# Patient Record
Sex: MALE | Race: WHITE | NOT HISPANIC OR LATINO | Employment: FULL TIME | URBAN - NONMETROPOLITAN AREA
[De-identification: names, ages, dates, MRNs, and addresses within clinical notes are randomized per-mention and may not be internally consistent; named-entity substitution may affect disease eponyms.]

---

## 2017-11-07 ENCOUNTER — TRANSCRIBE ORDERS (OUTPATIENT)
Dept: ADMINISTRATIVE | Facility: HOSPITAL | Age: 27
End: 2017-11-07

## 2017-11-07 DIAGNOSIS — N50.819 TESTICULAR PAIN: Primary | ICD-10-CM

## 2017-11-10 ENCOUNTER — APPOINTMENT (OUTPATIENT)
Dept: ULTRASOUND IMAGING | Facility: HOSPITAL | Age: 27
End: 2017-11-10

## 2018-06-20 ENCOUNTER — APPOINTMENT (OUTPATIENT)
Dept: CT IMAGING | Age: 28
End: 2018-06-20
Payer: COMMERCIAL

## 2018-06-20 ENCOUNTER — HOSPITAL ENCOUNTER (EMERGENCY)
Age: 28
Discharge: HOME | End: 2018-06-20
Attending: EMERGENCY MEDICINE
Payer: COMMERCIAL

## 2018-06-20 VITALS
WEIGHT: 260 LBS | DIASTOLIC BLOOD PRESSURE: 82 MMHG | HEART RATE: 93 BPM | SYSTOLIC BLOOD PRESSURE: 149 MMHG | BODY MASS INDEX: 36.4 KG/M2 | TEMPERATURE: 97.8 F | HEIGHT: 71 IN | OXYGEN SATURATION: 95 % | RESPIRATION RATE: 19 BRPM

## 2018-06-20 DIAGNOSIS — N20.1 URETEROLITHIASIS: Primary | ICD-10-CM

## 2018-06-20 LAB
ALBUMIN SERPL-MCNC: 4.8 G/DL (ref 3.5–5.2)
ALP BLD-CCNC: 98 U/L (ref 40–130)
ALT SERPL-CCNC: 43 U/L (ref 5–41)
ANION GAP SERPL CALCULATED.3IONS-SCNC: 12 MMOL/L (ref 7–19)
AST SERPL-CCNC: 29 U/L (ref 5–40)
BASOPHILS ABSOLUTE: 0.2 K/UL (ref 0–0.2)
BASOPHILS RELATIVE PERCENT: 1.5 % (ref 0–1)
BILIRUB SERPL-MCNC: 0.8 MG/DL (ref 0.2–1.2)
BILIRUBIN URINE: NEGATIVE
BLOOD, URINE: ABNORMAL
BUN BLDV-MCNC: 9 MG/DL (ref 6–20)
CALCIUM SERPL-MCNC: 9.8 MG/DL (ref 8.6–10)
CHLORIDE BLD-SCNC: 104 MMOL/L (ref 98–111)
CLARITY: ABNORMAL
CO2: 28 MMOL/L (ref 22–29)
COLOR: ABNORMAL
CREAT SERPL-MCNC: 0.9 MG/DL (ref 0.5–1.2)
CRYSTALS, UA: ABNORMAL /HPF
EOSINOPHILS ABSOLUTE: 0.5 K/UL (ref 0–0.6)
EOSINOPHILS RELATIVE PERCENT: 4.5 % (ref 0–5)
GFR NON-AFRICAN AMERICAN: >60
GLUCOSE BLD-MCNC: 125 MG/DL (ref 74–109)
GLUCOSE URINE: NEGATIVE MG/DL
HCT VFR BLD CALC: 47 % (ref 42–52)
HEMOGLOBIN: 16.1 G/DL (ref 14–18)
KETONES, URINE: NEGATIVE MG/DL
LEUKOCYTE ESTERASE, URINE: NEGATIVE
LYMPHOCYTES ABSOLUTE: 2.1 K/UL (ref 1.1–4.5)
LYMPHOCYTES RELATIVE PERCENT: 18.2 % (ref 20–40)
MCH RBC QN AUTO: 30.8 PG (ref 27–31)
MCHC RBC AUTO-ENTMCNC: 34.3 G/DL (ref 33–37)
MCV RBC AUTO: 90 FL (ref 80–94)
MONOCYTES ABSOLUTE: 0.9 K/UL (ref 0–0.9)
MONOCYTES RELATIVE PERCENT: 7.6 % (ref 0–10)
MUCUS: ABNORMAL /LPF
NEUTROPHILS ABSOLUTE: 7.9 K/UL (ref 1.5–7.5)
NEUTROPHILS RELATIVE PERCENT: 67.9 % (ref 50–65)
NITRITE, URINE: NEGATIVE
PDW BLD-RTO: 11.5 % (ref 11.5–14.5)
PH UA: 5.5
PLATELET # BLD: 285 K/UL (ref 130–400)
PMV BLD AUTO: 10.1 FL (ref 9.4–12.4)
POTASSIUM SERPL-SCNC: 3.5 MMOL/L (ref 3.5–5)
PROTEIN UA: 30 MG/DL
RBC # BLD: 5.22 M/UL (ref 4.7–6.1)
RBC UA: ABNORMAL /HPF (ref 0–2)
SODIUM BLD-SCNC: 144 MMOL/L (ref 136–145)
SPECIFIC GRAVITY UA: 1.03
TOTAL PROTEIN: 8 G/DL (ref 6.6–8.7)
URINE REFLEX TO CULTURE: ABNORMAL
UROBILINOGEN, URINE: 0.2 E.U./DL
WBC # BLD: 11.6 K/UL (ref 4.8–10.8)
WBC UA: ABNORMAL /HPF (ref 0–5)

## 2018-06-20 PROCEDURE — 36415 COLL VENOUS BLD VENIPUNCTURE: CPT

## 2018-06-20 PROCEDURE — 81001 URINALYSIS AUTO W/SCOPE: CPT

## 2018-06-20 PROCEDURE — 96374 THER/PROPH/DIAG INJ IV PUSH: CPT

## 2018-06-20 PROCEDURE — 85025 COMPLETE CBC W/AUTO DIFF WBC: CPT

## 2018-06-20 PROCEDURE — 74150 CT ABDOMEN W/O CONTRAST: CPT

## 2018-06-20 PROCEDURE — 6360000002 HC RX W HCPCS: Performed by: EMERGENCY MEDICINE

## 2018-06-20 PROCEDURE — 96376 TX/PRO/DX INJ SAME DRUG ADON: CPT

## 2018-06-20 PROCEDURE — 99284 EMERGENCY DEPT VISIT MOD MDM: CPT | Performed by: EMERGENCY MEDICINE

## 2018-06-20 PROCEDURE — 99284 EMERGENCY DEPT VISIT MOD MDM: CPT

## 2018-06-20 PROCEDURE — 2580000003 HC RX 258: Performed by: EMERGENCY MEDICINE

## 2018-06-20 PROCEDURE — 80053 COMPREHEN METABOLIC PANEL: CPT

## 2018-06-20 PROCEDURE — 96375 TX/PRO/DX INJ NEW DRUG ADDON: CPT

## 2018-06-20 RX ORDER — HYDROCODONE BITARTRATE AND ACETAMINOPHEN 5; 325 MG/1; MG/1
1 TABLET ORAL EVERY 6 HOURS PRN
Qty: 16 TABLET | Refills: 0 | Status: SHIPPED | OUTPATIENT
Start: 2018-06-20 | End: 2018-06-27

## 2018-06-20 RX ORDER — MONTELUKAST SODIUM 10 MG/1
10 TABLET ORAL NIGHTLY
COMMUNITY

## 2018-06-20 RX ORDER — TAMSULOSIN HYDROCHLORIDE 0.4 MG/1
0.4 CAPSULE ORAL DAILY
Qty: 10 CAPSULE | Refills: 0 | Status: SHIPPED | OUTPATIENT
Start: 2018-06-20 | End: 2020-02-01

## 2018-06-20 RX ORDER — MORPHINE SULFATE 1 MG/ML
4 INJECTION, SOLUTION EPIDURAL; INTRATHECAL; INTRAVENOUS ONCE
Status: COMPLETED | OUTPATIENT
Start: 2018-06-20 | End: 2018-06-20

## 2018-06-20 RX ORDER — ONDANSETRON 4 MG/1
4 TABLET, ORALLY DISINTEGRATING ORAL EVERY 8 HOURS PRN
Qty: 15 TABLET | Refills: 0 | Status: SHIPPED | OUTPATIENT
Start: 2018-06-20 | End: 2020-02-01

## 2018-06-20 RX ORDER — HYDROCODONE BITARTRATE AND ACETAMINOPHEN 10; 325 MG/1; MG/1
1 TABLET ORAL EVERY 6 HOURS PRN
COMMUNITY
End: 2018-06-20 | Stop reason: ALTCHOICE

## 2018-06-20 RX ORDER — ONDANSETRON 2 MG/ML
4 INJECTION INTRAMUSCULAR; INTRAVENOUS ONCE
Status: COMPLETED | OUTPATIENT
Start: 2018-06-20 | End: 2018-06-20

## 2018-06-20 RX ORDER — SODIUM CHLORIDE 9 MG/ML
1000 INJECTION, SOLUTION INTRAVENOUS ONCE
Status: COMPLETED | OUTPATIENT
Start: 2018-06-20 | End: 2018-06-20

## 2018-06-20 RX ADMIN — Medication 4 MG: at 21:02

## 2018-06-20 RX ADMIN — ONDANSETRON HYDROCHLORIDE 4 MG: 2 INJECTION, SOLUTION INTRAMUSCULAR; INTRAVENOUS at 21:02

## 2018-06-20 RX ADMIN — SODIUM CHLORIDE 1000 ML: 9 INJECTION, SOLUTION INTRAVENOUS at 21:07

## 2018-06-20 RX ADMIN — Medication 4 MG: at 22:44

## 2018-06-20 ASSESSMENT — PAIN DESCRIPTION - ORIENTATION: ORIENTATION: RIGHT

## 2018-06-20 ASSESSMENT — ENCOUNTER SYMPTOMS
VOMITING: 0
SHORTNESS OF BREATH: 0
NAUSEA: 1
ABDOMINAL PAIN: 0

## 2018-06-20 ASSESSMENT — PAIN SCALES - GENERAL
PAINLEVEL_OUTOF10: 7
PAINLEVEL_OUTOF10: 9
PAINLEVEL_OUTOF10: 10
PAINLEVEL_OUTOF10: 7

## 2018-06-20 ASSESSMENT — PAIN DESCRIPTION - LOCATION: LOCATION: FLANK

## 2018-06-20 ASSESSMENT — PAIN DESCRIPTION - PAIN TYPE
TYPE: ACUTE PAIN
TYPE: ACUTE PAIN

## 2020-02-01 ENCOUNTER — HOSPITAL ENCOUNTER (EMERGENCY)
Age: 30
Discharge: HOME | End: 2020-02-02
Attending: EMERGENCY MEDICINE
Payer: COMMERCIAL

## 2020-02-01 LAB
BILIRUBIN URINE: NEGATIVE
BLOOD, URINE: NEGATIVE
CLARITY: ABNORMAL
COLOR: YELLOW
GLUCOSE URINE: NEGATIVE MG/DL
KETONES, URINE: NEGATIVE MG/DL
LEUKOCYTE ESTERASE, URINE: NEGATIVE
NITRITE, URINE: NEGATIVE
PH UA: 7 (ref 5–8)
PROTEIN UA: NEGATIVE MG/DL
SPECIFIC GRAVITY UA: 1.02 (ref 1–1.03)
UROBILINOGEN, URINE: 0.2 E.U./DL

## 2020-02-01 PROCEDURE — 96375 TX/PRO/DX INJ NEW DRUG ADDON: CPT

## 2020-02-01 PROCEDURE — 96374 THER/PROPH/DIAG INJ IV PUSH: CPT

## 2020-02-01 PROCEDURE — 99283 EMERGENCY DEPT VISIT LOW MDM: CPT

## 2020-02-01 PROCEDURE — 2580000003 HC RX 258: Performed by: EMERGENCY MEDICINE

## 2020-02-01 PROCEDURE — 6360000002 HC RX W HCPCS: Performed by: EMERGENCY MEDICINE

## 2020-02-01 RX ORDER — ONDANSETRON 2 MG/ML
4 INJECTION INTRAMUSCULAR; INTRAVENOUS ONCE
Status: COMPLETED | OUTPATIENT
Start: 2020-02-01 | End: 2020-02-01

## 2020-02-01 RX ORDER — HYDROCODONE BITARTRATE AND ACETAMINOPHEN 10; 325 MG/1; MG/1
1 TABLET ORAL EVERY 6 HOURS PRN
COMMUNITY
End: 2021-12-29

## 2020-02-01 RX ORDER — SODIUM CHLORIDE, SODIUM LACTATE, POTASSIUM CHLORIDE, CALCIUM CHLORIDE 600; 310; 30; 20 MG/100ML; MG/100ML; MG/100ML; MG/100ML
1000 INJECTION, SOLUTION INTRAVENOUS ONCE
Status: COMPLETED | OUTPATIENT
Start: 2020-02-01 | End: 2020-02-02

## 2020-02-01 RX ORDER — ONDANSETRON 2 MG/ML
INJECTION INTRAMUSCULAR; INTRAVENOUS
Status: DISCONTINUED
Start: 2020-02-01 | End: 2020-02-02 | Stop reason: HOSPADM

## 2020-02-01 RX ORDER — KETOROLAC TROMETHAMINE 30 MG/ML
INJECTION, SOLUTION INTRAMUSCULAR; INTRAVENOUS
Status: DISCONTINUED
Start: 2020-02-01 | End: 2020-02-02 | Stop reason: HOSPADM

## 2020-02-01 RX ORDER — KETOROLAC TROMETHAMINE 30 MG/ML
30 INJECTION, SOLUTION INTRAMUSCULAR; INTRAVENOUS ONCE
Status: COMPLETED | OUTPATIENT
Start: 2020-02-01 | End: 2020-02-01

## 2020-02-01 RX ORDER — LISINOPRIL 10 MG/1
10 TABLET ORAL DAILY
COMMUNITY

## 2020-02-01 RX ADMIN — ONDANSETRON 4 MG: 2 INJECTION INTRAMUSCULAR; INTRAVENOUS at 23:26

## 2020-02-01 RX ADMIN — KETOROLAC TROMETHAMINE 30 MG: 30 INJECTION, SOLUTION INTRAMUSCULAR at 23:26

## 2020-02-01 RX ADMIN — SODIUM CHLORIDE, POTASSIUM CHLORIDE, SODIUM LACTATE AND CALCIUM CHLORIDE 1000 ML: 600; 310; 30; 20 INJECTION, SOLUTION INTRAVENOUS at 23:26

## 2020-02-01 ASSESSMENT — ENCOUNTER SYMPTOMS
EYE PAIN: 0
EYE REDNESS: 0
ABDOMINAL PAIN: 0
SHORTNESS OF BREATH: 0
COUGH: 0
VOMITING: 0
VOICE CHANGE: 0
DIARRHEA: 0
RHINORRHEA: 0

## 2020-02-01 ASSESSMENT — PAIN DESCRIPTION - DESCRIPTORS: DESCRIPTORS: SHARP

## 2020-02-01 ASSESSMENT — PAIN SCALES - GENERAL
PAINLEVEL_OUTOF10: 10
PAINLEVEL_OUTOF10: 10

## 2020-02-01 ASSESSMENT — PAIN DESCRIPTION - LOCATION: LOCATION: FLANK

## 2020-02-01 ASSESSMENT — PAIN DESCRIPTION - ORIENTATION: ORIENTATION: RIGHT

## 2020-02-02 VITALS
RESPIRATION RATE: 18 BRPM | HEART RATE: 82 BPM | TEMPERATURE: 97.8 F | OXYGEN SATURATION: 94 % | SYSTOLIC BLOOD PRESSURE: 109 MMHG | WEIGHT: 270 LBS | BODY MASS INDEX: 38.65 KG/M2 | HEIGHT: 70 IN | DIASTOLIC BLOOD PRESSURE: 74 MMHG

## 2020-02-02 PROCEDURE — 6360000002 HC RX W HCPCS: Performed by: EMERGENCY MEDICINE

## 2020-02-02 PROCEDURE — 99999 PR OFFICE/OUTPT VISIT,PROCEDURE ONLY: CPT | Performed by: EMERGENCY MEDICINE

## 2020-02-02 PROCEDURE — 96375 TX/PRO/DX INJ NEW DRUG ADDON: CPT

## 2020-02-02 PROCEDURE — 81003 URINALYSIS AUTO W/O SCOPE: CPT

## 2020-02-02 RX ORDER — ONDANSETRON 4 MG/1
4 TABLET, ORALLY DISINTEGRATING ORAL EVERY 8 HOURS PRN
Qty: 20 TABLET | Refills: 0 | Status: SHIPPED | OUTPATIENT
Start: 2020-02-02 | End: 2020-02-17 | Stop reason: SDUPTHER

## 2020-02-02 RX ORDER — NAPROXEN 500 MG/1
500 TABLET ORAL 2 TIMES DAILY WITH MEALS
Qty: 20 TABLET | Refills: 0 | Status: SHIPPED | OUTPATIENT
Start: 2020-02-02 | End: 2020-02-10

## 2020-02-02 RX ORDER — HYOSCYAMINE SULFATE 0.12 MG/1
0.12 TABLET SUBLINGUAL
Qty: 15 EACH | Refills: 0 | Status: SHIPPED | OUTPATIENT
Start: 2020-02-02 | End: 2020-02-27 | Stop reason: ALTCHOICE

## 2020-02-02 RX ORDER — MORPHINE SULFATE 4 MG/ML
2 INJECTION, SOLUTION INTRAMUSCULAR; INTRAVENOUS ONCE
Status: COMPLETED | OUTPATIENT
Start: 2020-02-02 | End: 2020-02-02

## 2020-02-02 RX ADMIN — MORPHINE SULFATE 2 MG: 4 INJECTION, SOLUTION INTRAMUSCULAR; INTRAVENOUS at 00:20

## 2020-02-02 ASSESSMENT — PAIN SCALES - GENERAL: PAINLEVEL_OUTOF10: 8

## 2020-02-02 NOTE — ED PROVIDER NOTES
[facility] EMERGENCY DEPT  EMERGENCY DEPARTMENT ENCOUNTER      Pt Name: Kasey Neal  MRN: [id number]  [place] 1990  Date of evaluation: 2/1/2020  Provider: Yvrose So MD    [address] [place] [address]       Chief Complaint   Patient presents with    Flank Pain     Right side;  Pain began about 1700. Hx of kidney stones           HISTORY OF PRESENT ILLNESS   (Location/Symptom, Timing/Onset,Context/Setting, Quality, Duration, Modifying Factors, Severity)  Note limiting factors. Kasey Neal is a 34 y.o. male who presents to the emergency department with complaint of right low back pain that started approximately 530 this evening. Pain is sharp and does not radiate anywhere else. Has associated nausea but no vomiting. Has not noticed any change in urine appearance or character recently. Has a history of kidney stones in the past and states this feels similar to what he experienced with kidney stone previously. Has never had any large kidney stones that did not pass on their own without difficulty. HPI    NursingNotes were reviewed. REVIEW OF SYSTEMS    (2-9 systems for level 4, 10 or more for level 5)     Review of Systems   Constitutional: Negative for fatigue and fever. HENT: Negative for congestion, rhinorrhea and voice change. Eyes: Negative for pain and redness. Respiratory: Negative for cough and shortness of breath. Cardiovascular: Negative for chest pain. Gastrointestinal: Negative for abdominal pain, diarrhea and vomiting. Endocrine: Negative. Genitourinary: Positive for flank pain. Musculoskeletal: Negative for arthralgias and gait problem. Skin: Negative for rash and wound. Neurological: Negative for weakness and headaches. Hematological: Negative. Psychiatric/Behavioral: Negative. All other systems reviewed and are negative. A complete review of systems was performed and is negative except as noted above in the HPI.        PAST MEDICAL HISTORY     Past Medical Resp SpO2 Height Weight   (!) 143/100 97.8 °F (36.6 °C) Oral 82 18 93 % 5' 10\" (1.778 m) 270 lb (122.5 kg)       Physical Exam  Vitals signs and nursing note reviewed. Constitutional:       General: He is not in acute distress. Appearance: Normal appearance. He is well-developed. He is not diaphoretic. HENT:      Head: Normocephalic and atraumatic. Mouth/Throat:      Pharynx: No oropharyngeal exudate. Eyes:      General: No scleral icterus. Pupils: Pupils are equal, round, and reactive to light. Neck:      Musculoskeletal: Normal range of motion. Trachea: No tracheal deviation. Cardiovascular:      Rate and Rhythm: Normal rate. Pulses: Normal pulses. Heart sounds: Normal heart sounds. Pulmonary:      Effort: Pulmonary effort is normal.      Breath sounds: Normal breath sounds. No stridor. No wheezing or rhonchi. Abdominal:      General: There is no distension. Palpations: Abdomen is soft. Abdomen is not rigid. Tenderness: There is no abdominal tenderness. There is no right CVA tenderness, left CVA tenderness or guarding. Hernia: No hernia is present. Musculoskeletal:         General: No deformity. Skin:     General: Skin is warm and dry. Findings: No rash. Neurological:      Mental Status: He is alert and oriented to person, place, and time. Cranial Nerves: No cranial nerve deficit.       Coordination: Coordination normal.   Psychiatric:         Behavior: Behavior normal.         DIAGNOSTIC RESULTS     EKG: All EKG's are interpreted by the Emergency Department Physician who either signs or Co-signs this chart in the absence of a cardiologist.        RADIOLOGY:   Non-plain film images such as CT, Ultrasound and MRI are read by the radiologist. Plainradiographic images are visualized and preliminarily interpreted by the emergency physician with the below findings:        Interpretation per the Radiologist below, if available at the time of this note:    No orders to display         ED BEDSIDE ULTRASOUND:   Performed by ED Physician - none    LABS:  Labs Reviewed   URINALYSIS - Abnormal; Notable for the following components:       Result Value    Clarity, UA CLOUDY (*)     All other components within normal limits       All other labs were within normal range or not returned as of this dictation. Medications   lactated ringers infusion 1,000 mL (1,000 mLs Intravenous New Bag 2/1/20 2326)   ondansetron (ZOFRAN) 4 MG/2ML injection (has no administration in time range)   ketorolac (TORADOL) 30 MG/ML injection (has no administration in time range)   morphine injection 2 mg (has no administration in time range)   ketorolac (TORADOL) injection 30 mg (30 mg Intravenous Given 2/1/20 2326)   ondansetron (ZOFRAN) injection 4 mg (4 mg Intravenous Given 2/1/20 2326)       EMERGENCY DEPARTMENT COURSE and DIFFERENTIALDIAGNOSIS/MDM:   Vitals:    Vitals:    02/01/20 2302 02/01/20 2303 02/01/20 2333 02/02/20 0002   BP: (!) 133/98  (!) 126/92 112/77   Pulse:       Resp:       Temp:       TempSrc:       SpO2:  96% 94%    Weight:       Height:           MDM     Description of pain and presentation are consistent with renal stone. On previous work-ups, patient has never had a stone larger than 2 mm, and has no exam findings concerning for hydronephrosis. Urinalysis shows no significant hematuria and no signs of infection. Pain is improved after treatment here in the emergency department I feel patient is stable for discharge with symptomatic treatment of presumed nonobstructive kidney stone. Discussed Pacific return precautions with patient and he is agreeable. Evaluation and work-up here revealed no signs of emergent or life-threatening pathology that would necessitate admission for further work-up or management at this time. It is felt to be stable for discharge home with return precautions for worsening of the condition or development of new concerning symptoms. Patient was encouraged to follow-up with their primary care doctor in the appropriate timeframe. Necessary prescriptions and information have been provided for treatment at home. Patient voices understanding and agreement with the plan. CONSULTS:  None    PROCEDURES:  Unless otherwise notedbelow, none     Procedures      FINAL IMPRESSION     1. Acute right flank pain    2. History of kidney stones    3.  Nausea          DISPOSITION/PLAN   DISPOSITION        PATIENT REFERRED TO:  [facility] EMERGENCY DEPT  [place] Teresitadevin  [phone]    If symptoms worsen    Shannen Carreno, DO  [address]  [place] [id number] [phone]      As needed      DISCHARGE MEDICATIONS:  New Prescriptions    HYOSCYAMINE SULFATE SL (LEVSIN/SL) 0.125 MG SUBL    Place 0.125 mg under the tongue every 4-6 hours as needed (abdominal cramping/pain)    NAPROXEN (NAPROSYN) 500 MG TABLET    Take 1 tablet by mouth 2 times daily (with meals) for 10 days    ONDANSETRON (ZOFRAN ODT) 4 MG DISINTEGRATING TABLET    Take 1 tablet by mouth every 8 hours as needed for Nausea or Vomiting          (Please note that portions of this note were completed with a voice recognition program.  Efforts were made to edit the dictations butoccasionally words are mis-transcribed.)    Yvrose So MD (electronically signed)  AttendingEmergency Physician          Yvrose Garcia MD  02/02/20 1972

## 2020-02-03 ENCOUNTER — HOSPITAL ENCOUNTER (EMERGENCY)
Age: 30
Discharge: HOME | End: 2020-02-03
Attending: EMERGENCY MEDICINE
Payer: COMMERCIAL

## 2020-02-03 ENCOUNTER — APPOINTMENT (OUTPATIENT)
Dept: ULTRASOUND IMAGING | Age: 30
End: 2020-02-03
Payer: COMMERCIAL

## 2020-02-03 ENCOUNTER — APPOINTMENT (OUTPATIENT)
Dept: CT IMAGING | Age: 30
End: 2020-02-03
Payer: COMMERCIAL

## 2020-02-03 ENCOUNTER — OFFICE VISIT (OUTPATIENT)
Dept: UROLOGY | Age: 30
End: 2020-02-03
Payer: COMMERCIAL

## 2020-02-03 VITALS
SYSTOLIC BLOOD PRESSURE: 137 MMHG | RESPIRATION RATE: 18 BRPM | OXYGEN SATURATION: 91 % | DIASTOLIC BLOOD PRESSURE: 89 MMHG | HEART RATE: 98 BPM | TEMPERATURE: 98.3 F | BODY MASS INDEX: 38.65 KG/M2 | HEIGHT: 70 IN | WEIGHT: 270 LBS

## 2020-02-03 VITALS
HEART RATE: 108 BPM | BODY MASS INDEX: 38.8 KG/M2 | HEIGHT: 70 IN | SYSTOLIC BLOOD PRESSURE: 158 MMHG | TEMPERATURE: 96.5 F | DIASTOLIC BLOOD PRESSURE: 94 MMHG | WEIGHT: 271 LBS

## 2020-02-03 LAB
ALBUMIN SERPL-MCNC: 4.5 G/DL (ref 3.5–5.2)
ALP BLD-CCNC: 86 U/L (ref 40–130)
ALPHA FETOPROTEIN: 2.7 NG/ML (ref 0–8.3)
ALT SERPL-CCNC: 19 U/L (ref 5–41)
AMPHETAMINE SCREEN, URINE: NEGATIVE
ANION GAP SERPL CALCULATED.3IONS-SCNC: 12 MMOL/L (ref 7–19)
AST SERPL-CCNC: 20 U/L (ref 5–40)
BARBITURATE SCREEN URINE: NEGATIVE
BASOPHILS ABSOLUTE: 0.1 K/UL (ref 0–0.2)
BASOPHILS RELATIVE PERCENT: 0.4 % (ref 0–1)
BENZODIAZEPINE SCREEN, URINE: NEGATIVE
BILIRUB SERPL-MCNC: 2 MG/DL (ref 0.2–1.2)
BILIRUBIN URINE: NEGATIVE
BILIRUBIN, POC: 0
BLOOD URINE, POC: NORMAL
BLOOD, URINE: NEGATIVE
BUN BLDV-MCNC: 8 MG/DL (ref 6–20)
CALCIUM SERPL-MCNC: 9.3 MG/DL (ref 8.6–10)
CANNABINOID SCREEN URINE: NEGATIVE
CHLORIDE BLD-SCNC: 98 MMOL/L (ref 98–111)
CLARITY, POC: CLEAR
CLARITY: CLEAR
CO2: 24 MMOL/L (ref 22–29)
COCAINE METABOLITE SCREEN URINE: NEGATIVE
COLOR, POC: YELLOW
COLOR: YELLOW
CREAT SERPL-MCNC: 0.5 MG/DL (ref 0.5–1.2)
EOSINOPHILS ABSOLUTE: 0 K/UL (ref 0–0.6)
EOSINOPHILS RELATIVE PERCENT: 0.2 % (ref 0–5)
GFR NON-AFRICAN AMERICAN: >60
GLUCOSE BLD-MCNC: 124 MG/DL (ref 74–109)
GLUCOSE URINE, POC: NORMAL
GLUCOSE URINE: NEGATIVE MG/DL
HCT VFR BLD CALC: 48.6 % (ref 42–52)
HEMOGLOBIN: 16.2 G/DL (ref 14–18)
IMMATURE GRANULOCYTES #: 0.1 K/UL
INR BLD: 1.13 (ref 0.88–1.18)
KETONES, POC: NORMAL
KETONES, URINE: NEGATIVE MG/DL
LACTATE DEHYDROGENASE: 294 U/L (ref 91–215)
LEUKOCYTE EST, POC: NORMAL
LEUKOCYTE ESTERASE, URINE: NEGATIVE
LIPASE: 21 U/L (ref 13–60)
LYMPHOCYTES ABSOLUTE: 0.9 K/UL (ref 1.1–4.5)
LYMPHOCYTES RELATIVE PERCENT: 5.5 % (ref 20–40)
Lab: ABNORMAL
MCH RBC QN AUTO: 30.8 PG (ref 27–31)
MCHC RBC AUTO-ENTMCNC: 33.3 G/DL (ref 33–37)
MCV RBC AUTO: 92.4 FL (ref 80–94)
MONOCYTES ABSOLUTE: 1.9 K/UL (ref 0–0.9)
MONOCYTES RELATIVE PERCENT: 11.5 % (ref 0–10)
NEUTROPHILS ABSOLUTE: 13.4 K/UL (ref 1.5–7.5)
NEUTROPHILS RELATIVE PERCENT: 82 % (ref 50–65)
NITRITE, POC: NORMAL
NITRITE, URINE: NEGATIVE
OPIATE SCREEN URINE: POSITIVE
PDW BLD-RTO: 11.4 % (ref 11.5–14.5)
PH UA: 7.5 (ref 5–8)
PH, POC: 7
PLATELET # BLD: 274 K/UL (ref 130–400)
PMV BLD AUTO: 10.6 FL (ref 9.4–12.4)
POTASSIUM SERPL-SCNC: 4.6 MMOL/L (ref 3.5–5)
PROTEIN UA: NEGATIVE MG/DL
PROTEIN, POC: NORMAL
PROTHROMBIN TIME: 13.9 SEC (ref 12–14.6)
RBC # BLD: 5.26 M/UL (ref 4.7–6.1)
SODIUM BLD-SCNC: 134 MMOL/L (ref 136–145)
SPECIFIC GRAVITY UA: 1.02 (ref 1–1.03)
SPECIFIC GRAVITY, POC: 1.01
TOTAL PROTEIN: 7 G/DL (ref 6.6–8.7)
URINE REFLEX TO CULTURE: NORMAL
UROBILINOGEN, POC: 0.2
UROBILINOGEN, URINE: 0.2 E.U./DL
WBC # BLD: 16.4 K/UL (ref 4.8–10.8)

## 2020-02-03 PROCEDURE — 81003 URINALYSIS AUTO W/O SCOPE: CPT

## 2020-02-03 PROCEDURE — 96376 TX/PRO/DX INJ SAME DRUG ADON: CPT

## 2020-02-03 PROCEDURE — 36415 COLL VENOUS BLD VENIPUNCTURE: CPT

## 2020-02-03 PROCEDURE — 80307 DRUG TEST PRSMV CHEM ANLYZR: CPT

## 2020-02-03 PROCEDURE — 96375 TX/PRO/DX INJ NEW DRUG ADDON: CPT

## 2020-02-03 PROCEDURE — 71260 CT THORAX DX C+: CPT

## 2020-02-03 PROCEDURE — 83615 LACTATE (LD) (LDH) ENZYME: CPT

## 2020-02-03 PROCEDURE — 6360000004 HC RX CONTRAST MEDICATION: Performed by: EMERGENCY MEDICINE

## 2020-02-03 PROCEDURE — 81003 URINALYSIS AUTO W/O SCOPE: CPT | Performed by: UROLOGY

## 2020-02-03 PROCEDURE — 82105 ALPHA-FETOPROTEIN SERUM: CPT

## 2020-02-03 PROCEDURE — 96374 THER/PROPH/DIAG INJ IV PUSH: CPT

## 2020-02-03 PROCEDURE — 74177 CT ABD & PELVIS W/CONTRAST: CPT

## 2020-02-03 PROCEDURE — 99284 EMERGENCY DEPT VISIT MOD MDM: CPT

## 2020-02-03 PROCEDURE — 76870 US EXAM SCROTUM: CPT

## 2020-02-03 PROCEDURE — 6360000002 HC RX W HCPCS: Performed by: EMERGENCY MEDICINE

## 2020-02-03 PROCEDURE — 99204 OFFICE O/P NEW MOD 45 MIN: CPT | Performed by: UROLOGY

## 2020-02-03 PROCEDURE — 6370000000 HC RX 637 (ALT 250 FOR IP): Performed by: EMERGENCY MEDICINE

## 2020-02-03 PROCEDURE — 83690 ASSAY OF LIPASE: CPT

## 2020-02-03 PROCEDURE — 85025 COMPLETE CBC W/AUTO DIFF WBC: CPT

## 2020-02-03 PROCEDURE — 85610 PROTHROMBIN TIME: CPT

## 2020-02-03 PROCEDURE — 2580000003 HC RX 258: Performed by: EMERGENCY MEDICINE

## 2020-02-03 PROCEDURE — 84704 HCG FREE BETACHAIN TEST: CPT

## 2020-02-03 PROCEDURE — 80053 COMPREHEN METABOLIC PANEL: CPT

## 2020-02-03 RX ORDER — SODIUM CHLORIDE, SODIUM LACTATE, POTASSIUM CHLORIDE, CALCIUM CHLORIDE 600; 310; 30; 20 MG/100ML; MG/100ML; MG/100ML; MG/100ML
1000 INJECTION, SOLUTION INTRAVENOUS ONCE
Status: COMPLETED | OUTPATIENT
Start: 2020-02-03 | End: 2020-02-03

## 2020-02-03 RX ORDER — OXYCODONE HYDROCHLORIDE AND ACETAMINOPHEN 5; 325 MG/1; MG/1
1 TABLET ORAL EVERY 6 HOURS PRN
Qty: 12 TABLET | Refills: 0 | Status: ON HOLD | OUTPATIENT
Start: 2020-02-03 | End: 2020-02-04 | Stop reason: SDUPTHER

## 2020-02-03 RX ORDER — MORPHINE SULFATE 4 MG/ML
4 INJECTION, SOLUTION INTRAMUSCULAR; INTRAVENOUS ONCE
Status: COMPLETED | OUTPATIENT
Start: 2020-02-03 | End: 2020-02-03

## 2020-02-03 RX ORDER — MORPHINE SULFATE 4 MG/ML
4 INJECTION, SOLUTION INTRAMUSCULAR; INTRAVENOUS ONCE
Status: DISCONTINUED | OUTPATIENT
Start: 2020-02-03 | End: 2020-02-03

## 2020-02-03 RX ORDER — MORPHINE SULFATE 4 MG/ML
4 INJECTION, SOLUTION INTRAMUSCULAR; INTRAVENOUS ONCE
Status: DISCONTINUED | OUTPATIENT
Start: 2020-02-03 | End: 2020-02-03 | Stop reason: HOSPADM

## 2020-02-03 RX ORDER — ONDANSETRON 2 MG/ML
4 INJECTION INTRAMUSCULAR; INTRAVENOUS ONCE
Status: COMPLETED | OUTPATIENT
Start: 2020-02-03 | End: 2020-02-03

## 2020-02-03 RX ORDER — OXYCODONE HYDROCHLORIDE AND ACETAMINOPHEN 5; 325 MG/1; MG/1
1 TABLET ORAL ONCE
Status: COMPLETED | OUTPATIENT
Start: 2020-02-03 | End: 2020-02-03

## 2020-02-03 RX ORDER — PROMETHAZINE HYDROCHLORIDE 25 MG/1
25 TABLET ORAL EVERY 6 HOURS PRN
Qty: 20 TABLET | Refills: 0 | Status: SHIPPED | OUTPATIENT
Start: 2020-02-03 | End: 2020-04-21 | Stop reason: SDUPTHER

## 2020-02-03 RX ORDER — ALBUTEROL SULFATE 90 UG/1
AEROSOL, METERED RESPIRATORY (INHALATION)
COMMUNITY
Start: 2020-01-21

## 2020-02-03 RX ADMIN — OXYCODONE HYDROCHLORIDE AND ACETAMINOPHEN 1 TABLET: 5; 325 TABLET ORAL at 11:29

## 2020-02-03 RX ADMIN — MORPHINE SULFATE 4 MG: 4 INJECTION, SOLUTION INTRAMUSCULAR; INTRAVENOUS at 07:14

## 2020-02-03 RX ADMIN — MORPHINE SULFATE 4 MG: 4 INJECTION, SOLUTION INTRAMUSCULAR; INTRAVENOUS at 09:31

## 2020-02-03 RX ADMIN — SODIUM CHLORIDE, POTASSIUM CHLORIDE, SODIUM LACTATE AND CALCIUM CHLORIDE 1000 ML: 600; 310; 30; 20 INJECTION, SOLUTION INTRAVENOUS at 09:30

## 2020-02-03 RX ADMIN — ONDANSETRON 4 MG: 2 INJECTION INTRAMUSCULAR; INTRAVENOUS at 07:14

## 2020-02-03 RX ADMIN — IOPAMIDOL 90 ML: 755 INJECTION, SOLUTION INTRAVENOUS at 07:56

## 2020-02-03 RX ADMIN — MORPHINE SULFATE 4 MG: 4 INJECTION, SOLUTION INTRAMUSCULAR; INTRAVENOUS at 11:11

## 2020-02-03 RX ADMIN — SODIUM CHLORIDE, POTASSIUM CHLORIDE, SODIUM LACTATE AND CALCIUM CHLORIDE 1000 ML: 600; 310; 30; 20 INJECTION, SOLUTION INTRAVENOUS at 07:14

## 2020-02-03 ASSESSMENT — ENCOUNTER SYMPTOMS
EYE REDNESS: 0
NAUSEA: 1
BACK PAIN: 1
DIARRHEA: 0
SHORTNESS OF BREATH: 0
VOMITING: 1
ABDOMINAL PAIN: 0
CONSTIPATION: 0
BACK PAIN: 0
SHORTNESS OF BREATH: 0
WHEEZING: 0
COUGH: 0
EYE DISCHARGE: 0

## 2020-02-03 ASSESSMENT — PAIN SCALES - GENERAL
PAINLEVEL_OUTOF10: 9
PAINLEVEL_OUTOF10: 9
PAINLEVEL_OUTOF10: 8
PAINLEVEL_OUTOF10: 7
PAINLEVEL_OUTOF10: 8
PAINLEVEL_OUTOF10: 9

## 2020-02-03 ASSESSMENT — PAIN DESCRIPTION - ORIENTATION: ORIENTATION: RIGHT;LEFT

## 2020-02-03 ASSESSMENT — PAIN DESCRIPTION - LOCATION: LOCATION: BACK

## 2020-02-03 NOTE — ED PROVIDER NOTES
partner: None     Emotionally abused: None     Physically abused: None     Forced sexual activity: None   Other Topics Concern    None   Social History Narrative    None       SCREENINGS             PHYSICAL EXAM    (up to 7 for level 4, 8 or more for level 5)     ED Triage Vitals [02/03/20 0636]   BP Temp Temp Source Pulse Resp SpO2 Height Weight   (!) 158/98 98.3 °F (36.8 °C) Oral 100 16 95 % 5' 10\" (1.778 m) 270 lb (122.5 kg)       Physical Exam  Vitals signs and nursing note reviewed. Constitutional:       General: He is not in acute distress. Appearance: Normal appearance. He is obese. He is not ill-appearing, toxic-appearing or diaphoretic. HENT:      Head: Normocephalic and atraumatic. Nose: Nose normal.      Mouth/Throat:      Mouth: Mucous membranes are moist.   Eyes:      Extraocular Movements: Extraocular movements intact. Pupils: Pupils are equal, round, and reactive to light. Comments: Mildly enlarged bilat pupils    Neck:      Musculoskeletal: Normal range of motion and neck supple. Cardiovascular:      Rate and Rhythm: Normal rate and regular rhythm. Pulses: Normal pulses. Pulmonary:      Effort: Pulmonary effort is normal.      Breath sounds: Normal breath sounds. Abdominal:      General: Abdomen is flat. Tenderness: There is no abdominal tenderness. Genitourinary:     Comments: On my exam the patient has really no tenderness of his testicles however the left one appears normal the right 1 feels hard and lumpy to me it does not feel normal consistency compared to the left. The patient has a normal penis. Musculoskeletal: Normal range of motion. Comments: R lower back and flank with pain not midline    Skin:     General: Skin is warm and dry. Capillary Refill: Capillary refill takes less than 2 seconds. Findings: Rash present.       Comments: Ant lower abd wall and arms with scaling and erythema chronic    Neurological:      General: No focal - Abnormal; Notable for the following components:    WBC 16.4 (*)     RDW 11.4 (*)     Neutrophils % 82.0 (*)     Lymphocytes % 5.5 (*)     Monocytes % 11.5 (*)     Neutrophils Absolute 13.4 (*)     Lymphocytes Absolute 0.9 (*)     Monocytes Absolute 1.90 (*)     All other components within normal limits   URINE DRUG SCREEN - Abnormal; Notable for the following components:    Opiate Scrn, Ur Positive (*)     All other components within normal limits   LACTATE DEHYDROGENASE - Abnormal; Notable for the following components:     (*)     All other components within normal limits   LIPASE   PROTIME-INR   URINE RT REFLEX TO CULTURE   AFP TUMOR MARKER   HCG, TUMOR MARKER       All other labs were within normal range or not returned as of this dictation. EMERGENCY DEPARTMENT COURSE and DIFFERENTIALDIAGNOSIS/MDM:   Vitals:    Vitals:    02/03/20 0636 02/03/20 0702 02/03/20 0732   BP: (!) 158/98 (!) 159/98 137/89   Pulse: 100  98   Resp: 16  18   Temp: 98.3 °F (36.8 °C)     TempSrc: Oral     SpO2: 95% 92% 91%   Weight: 270 lb (122.5 kg)     Height: 5' 10\" (1.778 m)         MDM  Number of Diagnoses or Management Options  Mass of right testicle: new and requires workup  Retroperitoneal lymphadenopathy: new and requires workup  Diagnosis management comments: Patient with back pain. Chronic pain medication. He was seen in the ER the other day his urine was negative. He felt like this may be a kidney stone. But it is very low down on the right. Really seems more musculoskeletal clinically his urine is negative. Given his leukocytosis I ordered a CT scan with IV contrast of his abdomen pelvis. On my review there is clearly some mass. Radiology read this as retroperitoneal lymphadenopathy. The patient does not have any history of cancer or lymphoma. His white blood cell count differential is not really revealing. I discussed this case with Dr. Amber Tolliver.   The radiologist made note of possibility of testicle spreading to this area. I went in and asked him he really did not have any issues. I did a testicle exam finding that his right testicle was abnormal.  Ultrasound was ordered. This shows a testicular mass. Unfortunately this looks like metastatic testicular cancer that is newly diagnosed in the ED today. I talked to Dr. Logan Muñoz in urology clinic. He is graciously willing to see this patient today. The patient was given multiple rounds of IV pain medication I am to write him for some Percocet. As well as Phenergan. The patient will see Dr. Scooby Arnett on Wednesday I discussed this case with him multiple times and he has urology follow-up today. Should the patient's pain worsen we can do it outpatient I told him just to come back and we will admit him but otherwise hopefully we just get all these appointments done seen by specialist in the next 48 hours. Given the metastatic spread on the abdomen CT a CT scan of the chest was performed this showed no adenopathy. Given that is already spread. The indication to get the testicle surgery done is to make sure that we have a tissue diagnosis as opposed to preventing mets. Amount and/or Complexity of Data Reviewed  Clinical lab tests: ordered and reviewed  Tests in the radiology section of CPT®: ordered and reviewed  Decide to obtain previous medical records or to obtain history from someone other than the patient: yes  Obtain history from someone other than the patient: yes  Discuss the patient with other providers: yes    Risk of Complications, Morbidity, and/or Mortality  Presenting problems: high    Patient Progress  Patient progress: stable        CONSULTS:  None    PROCEDURES:  Unless otherwise notedbelow, none     Procedures    FINAL IMPRESSION     1. Mass of right testicle    2.  Retroperitoneal lymphadenopathy          DISPOSITION/PLAN   DISPOSITION Decision To Discharge 02/03/2020 11:14:17 AM      PATIENT REFERRED TO:  Loly Willis MD  [id number]

## 2020-02-03 NOTE — PROGRESS NOTES
Camacho Hines is a 34 y.o. male who presents today   Chief Complaint   Patient presents with    New Patient     I was seen in the ER today and I am here following up with a right testicular mass      Testicular mass  Patient is a 31-year-old male here for evaluation of a right testicular mass that was discovered today. Patient was seen in the emergency department with recent onset of lower back pain. He says this started about 2 to 3 days ago. He does have a prior history of back problems and back surgery. The pain is located in the lower back in the lumbosacral region but maybe just to the right side. He was seen approximately 0.5 years ago for symptoms of flank pain thought to be associated with a kidney stone. He first came in on February 1, 2020 with this back pain thinking it was a kidney stone after his pain was controlled he was released. He returned today CT scan was done and this showed a 5 cm interaortocaval retroperitoneal mass. This led to a testicular ultrasound which showed a right testis mass. Patient denies ever noticing a mass or lump in his right testicle. He denies any weight loss no nausea vomiting. No shortness of breath he has had some constipation associated with taking Percocet      Past Medical History:   Diagnosis Date    Back pain     Hypertension     Lumbar stress fracture        History reviewed. No pertinent surgical history. Current Outpatient Medications   Medication Sig Dispense Refill    oxyCODONE-acetaminophen (PERCOCET) 5-325 MG per tablet Take 1 tablet by mouth every 6 hours as needed for Pain for up to 3 days. Intended supply: 3 days.  Take lowest dose possible to manage pain 12 tablet 0    promethazine (PHENERGAN) 25 MG tablet Take 1 tablet by mouth every 6 hours as needed for Nausea 20 tablet 0    albuterol sulfate  (90 Base) MCG/ACT inhaler USE 2 PUFFS Q 4 H PRN      naproxen (NAPROSYN) 500 MG tablet Take 1 tablet by mouth 2 times daily (with reviewed. No pertinent family history. REVIEW OF SYSTEMS:  Review of Systems   Constitutional: Negative for chills and fever. HENT: Negative for congestion and hearing loss. Eyes: Negative for discharge and redness. Respiratory: Negative for cough, shortness of breath and wheezing. Cardiovascular: Negative for leg swelling. Gastrointestinal: Negative for abdominal pain, constipation and diarrhea. Endocrine: Negative for cold intolerance and heat intolerance. Genitourinary: Negative for decreased urine volume, difficulty urinating, dysuria, enuresis, flank pain, frequency, genital sores, hematuria and urgency. Musculoskeletal: Negative for back pain and gait problem. Skin: Negative for rash. Allergic/Immunologic: Negative for environmental allergies. Neurological: Negative for dizziness, weakness and headaches. Hematological: Does not bruise/bleed easily. Psychiatric/Behavioral: Negative for behavioral problems, confusion and sleep disturbance. PHYSICAL EXAM:  BP (!) 158/94   Pulse 108   Temp 96.5 °F (35.8 °C) (Temporal)   Ht 5' 10\" (1.778 m)   Wt 271 lb (122.9 kg)   BMI 38.88 kg/m²   Physical Exam  Vitals signs and nursing note reviewed. Constitutional:       Appearance: He is well-developed. He is obese. HENT:      Head: Normocephalic and atraumatic. Eyes:      General: No scleral icterus. Conjunctiva/sclera: Conjunctivae normal.   Neck:      Musculoskeletal: Normal range of motion. Cardiovascular:      Rate and Rhythm: Normal rate and regular rhythm. Pulmonary:      Effort: Pulmonary effort is normal. No respiratory distress. Breath sounds: Normal breath sounds. Abdominal:      General: Bowel sounds are normal. There is no distension. Palpations: Abdomen is soft. There is no mass. Tenderness: There is no abdominal tenderness. Hernia: There is no hernia in the right inguinal area or left inguinal area.    Genitourinary:     Penis: Normal [facility]     Alpha Fetoprotein 2.7  0.0 - 8.3 ng/mL Final 02/03/2020  7:10 AM  - [facility]       IMAGING:  CT scan of the abdomen pelvis with contrast shows a 5 cm retroperitoneal interaortocaval mass consistent with retroperitoneal metastasis. Testicular ultrasound done today shows a 3.1 x 1.8 x 2.3 cm mass within the right testicle. The larger hypodense distant with testis cancer. Chest CT scan done today shows no pulmonary mediastinal or hilar metastasis or adenopathy. 1. Mass of right testis  This appears to be consistent with testis cancer with metastasis to the retroperitoneum without chest or pulmonary metastasis. He is already had tumor markers drawn though the hCG is pending. Discussed with the patient and his family and his fiancée today that he needs a right radical orchiectomy. He is willing to proceed. The risk and complications were discussed with him including the risk of wound infection hematoma, chronic inguinal pain from ilioinguinal nerve entrapment or scar. Inguinal hernia. We did discuss the fact that he does not have kids and he is engaged that fertility could be an issue however his fiancée has had a hysterectomy she has a child of her own that he is involved with and he says this is not of concern. - POCT Urinalysis No Micro (Auto)    2. Cancer of descended right testis [facility])  Plan for right radical orchiectomy. Orders Placed This Encounter   Procedures    POCT Urinalysis No Micro (Auto)        Return for PT to be scheduled for Surgery. All information inputted into the note by the MA to include chief complaint, past medical history, past surgical history, medications, allergies, social and family history and review of systems has been reviewed and updated as needed by me. EMR Dragon/transcription disclaimer: Much of this documentt is electronic  transcription/translation of spoken language to printed text.  The  electronic translation of

## 2020-02-03 NOTE — PROGRESS NOTES
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 Norm Gorman was first seen by me on 2/5/2020. He presented to the ER department with complaints of back pain on 2/4/2020. A CT of the abdomen revealed a retroperitoneal mass. Further examination also revealed a right testicular mass. Urology was consulted and performed a right orchiectomy on 2/4/2020 consistent with pure seminoma. · 2/3/2020- CT of the abdomen showed 5 cm retroperitoneal lymph node mass within the upper abdomen in the aortocaval location. No liver metastasis. · 2/3/2020-CT of the chest showed no evidence of intrathoracic metastatic disease. · 2/4/2020-right orchiectomy by Dr. Mary Torre at [facility] consistent with pure seminoma. Tumor measuring 2.5 cm and confined to the testis. Spermatic cord margin negative.   Final pathologic staging [id number], S0 stage IIB  · 2/5/2020-recommended 4 cycles of etoposide 100 mg per metered squared days 1-5 and cisplatin 20mg/m2 days 1-5 × 4 cycles q. 21 days      Past Medical History:    Past Medical History:   Diagnosis Date    Back pain     Hypertension     Lumbar stress fracture    History of asthma  Tobacco abuse x12 years    Past Surgical History:      · Right orchiectomy 2/4/2020    Social History:    Social History     Socioeconomic History    Marital status: Single     Spouse name: Not on file    Number of children: Not on file    Years of education: Not on file    Highest education level: Not on file   Occupational History    Not on file Social Needs    Financial resource strain: Not on file    Food insecurity:     Worry: Not on file     Inability: Not on file    Transportation needs:     Medical: Not on file     Non-medical: Not on file   Tobacco Use    Smoking status: Former Smoker     Packs/day: 0.50    Smokeless tobacco: Never Used   Substance and Sexual Activity    Alcohol use: No    Drug use: No    Sexual activity: Yes     Partners: Female   Lifestyle    Physical activity:     Days per week: Not on file     Minutes per session: Not on file    Stress: Not on file   Relationships    Social connections:     Talks on phone: Not on file     Gets together: Not on file     Attends Congregational service: Not on file     Active member of club or organization: Not on file     Attends meetings of clubs or organizations: Not on file     Relationship status: Not on file    Intimate partner violence:     Fear of current or ex partner: Not on file     Emotionally abused: Not on file     Physically abused: Not on file     Forced sexual activity: Not on file   Other Topics Concern    Not on file   Social History Narrative    Not on file   Smoker x12 years    Family History:     No family history of cancer    Current Hospital Medications:    No current facility-administered medications for this visit. Allergies:    Allergies   Allergen Reactions    Nuts [Peanut-Containing Drug Products]     Other      peanuts         Subjective   REVIEW OF SYSTEMS:   CONSTITUTIONAL: no fever, no night sweats, no fatigue;  HEENT: no blurring of vision, no double vision, no hearing difficulty, no tinnitus, no ulceration, no dysplasia, no epistaxis;  LUNGS: no cough, no hemoptysis, no wheeze,  no shortness of breath;  CARDIOVASCULAR: no palpitation, no chest pain, no shortness of breath;  GI: no abdominal pain, no nausea, no vomiting, no diarrhea, no constipation;  RISHI: no dysuria, no hematuria, no frequency or urgency, no nephrolithiasis;  MUSCULOSKELETAL: no joint pain, no swelling, no stiffness; back pain  ENDOCRINE: no polyuria, no polydipsia, no cold or heat intolerance;  HEMATOLOGY: no easy bruising or bleeding, no history of clotting disorder;  DERMATOLOGY: no skin rash, no eczema, no pruritus;  PSYCHIATRY: no depression, no anxiety, no panic attacks, no suicidal ideation, no homicidal ideation;  NEUROLOGY: no syncope, no seizures, no numbness or tingling of hands, no numbness or tingling of feet, no paresis;    Objective   BP (!) 140/88   Pulse 90   Ht 5' 10\" (1.778 m)   Wt 267 lb 14.4 oz (121.5 kg)   SpO2 93%   BMI 38.44 kg/m²     PHYSICAL EXAM:  CONSTITUTIONAL: Alert, appropriate, no acute distress  EYES: Non icteric, EOM intact, pupils equal round   ENT: Mucus membranes moist, no oral pharyngeal lesions, external inspection of ears and nose are normal  NECK: Supple, no masses. No palpable thyroid mass  CHEST/LUNGS: CTA bilaterally, normal respiratory effort   CARDIOVASCULAR: RRR, no murmurs. No lower extremity edema  ABDOMEN: soft non-tender, active bowel sounds, no HSM. No palpable masses  EXTREMITIES: warm, full ROM in all 4 extremities, no focal weakness. SKIN: warm, dry with no rashes or lesions  LYMPH: No cervical, clavicular, axillary, or inguinal lymphadenopathy  NEUROLOGIC: follows commands, non focal   PSYCH: mood and affect appropriate. Alert and oriented to time, place, person      LABORATORY RESULTS REVIEWED BY ME:  CBC:2/5/2020  WBC-19.12  HGB-16.0  PLT-229,000  Neut-16.21    2/3/2020-Baseline AFP, beta-hCG within normal limits. LDH elevated 294    RADIOLOGY STUDIES REVIEWED BY ME:  Ct Chest W Contrast    Result Date: 2/3/2020  1. No acute abnormality is seen within the chest. Signed by Dr Mo Agosto on 2/3/2020 10:30 AM    Us Scrotum And Testicles    Result Date: 2/3/2020  1. Lobular hypodense right intratesticular mass. 2. No testicular infarct. 3. Normal left testicle. 4. Small amount of scrotal fluid noted bilaterally. Signed by Dr Nestor Jaramillo on 2/3/2020 10:34 AM    Ct Abdomen Pelvis W Iv Contrast Additional Contrast? None    Result Date: 2/3/2020  1. Indeterminate 5 cm retroperitoneal lymph node mass within the upper abdomen. Mild surrounding inflammatory change. 2. Immediately adjacent to the does not appear to arise from the pancreas head. 3. Neoplastic lymph node involvement such as from lymphoma will need to be ruled out. Additionally, neoplastic process such as testicular cancer could showed lymphadenopathy in this region. Signed by Dr Nestor Jaramillo on 2/3/2020 8:34 AM      ASSESSMENT:  #Pure seminoma stage IIB [id number]  The patient was counseled today about diagnosis, staging, prognosis, diagnostic tests, medications, side effects and disease management. The method of counseling included verbal explanation. The patient verbalized understanding. Essentially, stage IIB Pure Seminoma. I reviewed with the patient the NCCN guidelines. We discussed BEP x3 versus EP x4. The patient has a history of asthma and has also been a smoker for 12 years. Therefore, I have offered EP x4 to avoid lung toxicity of bleomycin    #Risk of infertility-discussed with the patient at length about the risk of infertility. He is not interested at this time in seeking sperm banking. #Elevated total bilirubin- unknown etiology. Will repeat CMP    #Tobacco abuse- strongly encouraged to quit. Counseling took 3-5 minutes    PLAN:  Start chemotherapy 2/10/2020- 4 cycles etoposide/cisplatin q. 21 days  Repeat beta-hCG, LDH and AFP on day 1 of chemotherapy  RTC day 1 of chemotherapy on 2/10/2020      I have seen, examined and reviewed this patient medication list, appropriate labs and imaging studies. I reviewed relevant medical records and others physicians notes. I discussed the plans of care with the patient. I answered all the questions to the patients satisfaction.     (Please note that portions of this note were completed

## 2020-02-03 NOTE — ED NOTES
Pt resting in room without visible distress Awaiting CT results for further      Tara Salvage, RN  02/03/20 [id number]

## 2020-02-04 ENCOUNTER — HOSPITAL ENCOUNTER (OUTPATIENT)
Age: 30
Setting detail: OUTPATIENT SURGERY
Discharge: HOME | End: 2020-02-04
Attending: UROLOGY | Admitting: UROLOGY
Payer: COMMERCIAL

## 2020-02-04 ENCOUNTER — ANESTHESIA EVENT (OUTPATIENT)
Dept: OPERATING ROOM | Age: 30
End: 2020-02-04
Payer: COMMERCIAL

## 2020-02-04 ENCOUNTER — ANESTHESIA (OUTPATIENT)
Dept: OPERATING ROOM | Age: 30
End: 2020-02-04
Payer: COMMERCIAL

## 2020-02-04 VITALS
SYSTOLIC BLOOD PRESSURE: 101 MMHG | RESPIRATION RATE: 1 BRPM | OXYGEN SATURATION: 83 % | DIASTOLIC BLOOD PRESSURE: 54 MMHG | TEMPERATURE: 97.9 F

## 2020-02-04 VITALS
WEIGHT: 270 LBS | SYSTOLIC BLOOD PRESSURE: 128 MMHG | HEART RATE: 110 BPM | RESPIRATION RATE: 18 BRPM | DIASTOLIC BLOOD PRESSURE: 73 MMHG | HEIGHT: 70 IN | BODY MASS INDEX: 38.65 KG/M2 | OXYGEN SATURATION: 97 % | TEMPERATURE: 98.8 F

## 2020-02-04 PROBLEM — C62.11: Status: ACTIVE | Noted: 2020-02-04

## 2020-02-04 PROBLEM — G89.18 POSTOPERATIVE PAIN: Status: ACTIVE | Noted: 2020-02-04

## 2020-02-04 PROBLEM — N50.89 MASS OF RIGHT TESTICLE: Status: ACTIVE | Noted: 2020-02-04

## 2020-02-04 LAB — HCG TUMOR MARKER: <1 IU/L (ref 0–3)

## 2020-02-04 PROCEDURE — 3700000000 HC ANESTHESIA ATTENDED CARE: Performed by: UROLOGY

## 2020-02-04 PROCEDURE — 2500000003 HC RX 250 WO HCPCS: Performed by: UROLOGY

## 2020-02-04 PROCEDURE — 88309 TISSUE EXAM BY PATHOLOGIST: CPT

## 2020-02-04 PROCEDURE — 54530 REMOVAL OF TESTIS: CPT | Performed by: UROLOGY

## 2020-02-04 PROCEDURE — 3700000001 HC ADD 15 MINUTES (ANESTHESIA): Performed by: UROLOGY

## 2020-02-04 PROCEDURE — 7100000000 HC PACU RECOVERY - FIRST 15 MIN: Performed by: UROLOGY

## 2020-02-04 PROCEDURE — 6360000002 HC RX W HCPCS: Performed by: UROLOGY

## 2020-02-04 PROCEDURE — 3600000014 HC SURGERY LEVEL 4 ADDTL 15MIN: Performed by: UROLOGY

## 2020-02-04 PROCEDURE — 2709999900 HC NON-CHARGEABLE SUPPLY: Performed by: UROLOGY

## 2020-02-04 PROCEDURE — 7100000001 HC PACU RECOVERY - ADDTL 15 MIN: Performed by: UROLOGY

## 2020-02-04 PROCEDURE — 2580000003 HC RX 258: Performed by: ANESTHESIOLOGY

## 2020-02-04 PROCEDURE — 7100000010 HC PHASE II RECOVERY - FIRST 15 MIN: Performed by: UROLOGY

## 2020-02-04 PROCEDURE — 3600000004 HC SURGERY LEVEL 4 BASE: Performed by: UROLOGY

## 2020-02-04 PROCEDURE — 2580000003 HC RX 258: Performed by: UROLOGY

## 2020-02-04 PROCEDURE — 2500000003 HC RX 250 WO HCPCS: Performed by: NURSE ANESTHETIST, CERTIFIED REGISTERED

## 2020-02-04 PROCEDURE — 6370000000 HC RX 637 (ALT 250 FOR IP): Performed by: ANESTHESIOLOGY

## 2020-02-04 PROCEDURE — 2580000003 HC RX 258: Performed by: NURSE ANESTHETIST, CERTIFIED REGISTERED

## 2020-02-04 PROCEDURE — 6360000002 HC RX W HCPCS: Performed by: NURSE ANESTHETIST, CERTIFIED REGISTERED

## 2020-02-04 RX ORDER — DEXAMETHASONE SODIUM PHOSPHATE 10 MG/ML
INJECTION, SOLUTION INTRAMUSCULAR; INTRAVENOUS PRN
Status: DISCONTINUED | OUTPATIENT
Start: 2020-02-04 | End: 2020-02-04 | Stop reason: SDUPTHER

## 2020-02-04 RX ORDER — MIDAZOLAM HYDROCHLORIDE 1 MG/ML
INJECTION INTRAMUSCULAR; INTRAVENOUS PRN
Status: DISCONTINUED | OUTPATIENT
Start: 2020-02-04 | End: 2020-02-04 | Stop reason: SDUPTHER

## 2020-02-04 RX ORDER — SODIUM CHLORIDE 9 MG/ML
INJECTION, SOLUTION INTRAVENOUS CONTINUOUS
Status: DISCONTINUED | OUTPATIENT
Start: 2020-02-04 | End: 2020-02-04 | Stop reason: HOSPADM

## 2020-02-04 RX ORDER — MEPERIDINE HYDROCHLORIDE 50 MG/ML
12.5 INJECTION INTRAMUSCULAR; INTRAVENOUS; SUBCUTANEOUS EVERY 5 MIN PRN
Status: DISCONTINUED | OUTPATIENT
Start: 2020-02-04 | End: 2020-02-04 | Stop reason: HOSPADM

## 2020-02-04 RX ORDER — KETOROLAC TROMETHAMINE 30 MG/ML
15 INJECTION, SOLUTION INTRAMUSCULAR; INTRAVENOUS ONCE
Status: COMPLETED | OUTPATIENT
Start: 2020-02-04 | End: 2020-02-04

## 2020-02-04 RX ORDER — MORPHINE SULFATE 4 MG/ML
4 INJECTION, SOLUTION INTRAMUSCULAR; INTRAVENOUS EVERY 5 MIN PRN
Status: DISCONTINUED | OUTPATIENT
Start: 2020-02-04 | End: 2020-02-04 | Stop reason: HOSPADM

## 2020-02-04 RX ORDER — LIDOCAINE HYDROCHLORIDE 10 MG/ML
1 INJECTION, SOLUTION EPIDURAL; INFILTRATION; INTRACAUDAL; PERINEURAL
Status: DISCONTINUED | OUTPATIENT
Start: 2020-02-04 | End: 2020-02-04 | Stop reason: HOSPADM

## 2020-02-04 RX ORDER — SODIUM CHLORIDE 0.9 % (FLUSH) 0.9 %
10 SYRINGE (ML) INJECTION PRN
Status: DISCONTINUED | OUTPATIENT
Start: 2020-02-04 | End: 2020-02-04 | Stop reason: HOSPADM

## 2020-02-04 RX ORDER — MIDAZOLAM HYDROCHLORIDE 1 MG/ML
2 INJECTION INTRAMUSCULAR; INTRAVENOUS
Status: DISCONTINUED | OUTPATIENT
Start: 2020-02-04 | End: 2020-02-04 | Stop reason: HOSPADM

## 2020-02-04 RX ORDER — KETOROLAC TROMETHAMINE 30 MG/ML
INJECTION, SOLUTION INTRAMUSCULAR; INTRAVENOUS
Status: DISCONTINUED
Start: 2020-02-04 | End: 2020-02-04 | Stop reason: HOSPADM

## 2020-02-04 RX ORDER — KETOROLAC TROMETHAMINE 30 MG/ML
INJECTION, SOLUTION INTRAMUSCULAR; INTRAVENOUS PRN
Status: DISCONTINUED | OUTPATIENT
Start: 2020-02-04 | End: 2020-02-04 | Stop reason: SDUPTHER

## 2020-02-04 RX ORDER — PROPOFOL 10 MG/ML
INJECTION, EMULSION INTRAVENOUS PRN
Status: DISCONTINUED | OUTPATIENT
Start: 2020-02-04 | End: 2020-02-04 | Stop reason: SDUPTHER

## 2020-02-04 RX ORDER — FENTANYL CITRATE 50 UG/ML
50 INJECTION, SOLUTION INTRAMUSCULAR; INTRAVENOUS
Status: DISCONTINUED | OUTPATIENT
Start: 2020-02-04 | End: 2020-02-04 | Stop reason: HOSPADM

## 2020-02-04 RX ORDER — PROMETHAZINE HYDROCHLORIDE 25 MG/ML
6.25 INJECTION, SOLUTION INTRAMUSCULAR; INTRAVENOUS
Status: DISCONTINUED | OUTPATIENT
Start: 2020-02-04 | End: 2020-02-04 | Stop reason: HOSPADM

## 2020-02-04 RX ORDER — SODIUM CHLORIDE 0.9 % (FLUSH) 0.9 %
10 SYRINGE (ML) INJECTION EVERY 12 HOURS SCHEDULED
Status: DISCONTINUED | OUTPATIENT
Start: 2020-02-04 | End: 2020-02-04 | Stop reason: HOSPADM

## 2020-02-04 RX ORDER — ONDANSETRON 2 MG/ML
INJECTION INTRAMUSCULAR; INTRAVENOUS PRN
Status: DISCONTINUED | OUTPATIENT
Start: 2020-02-04 | End: 2020-02-04 | Stop reason: SDUPTHER

## 2020-02-04 RX ORDER — SODIUM CHLORIDE, SODIUM LACTATE, POTASSIUM CHLORIDE, CALCIUM CHLORIDE 600; 310; 30; 20 MG/100ML; MG/100ML; MG/100ML; MG/100ML
INJECTION, SOLUTION INTRAVENOUS CONTINUOUS
Status: DISCONTINUED | OUTPATIENT
Start: 2020-02-04 | End: 2020-02-04 | Stop reason: HOSPADM

## 2020-02-04 RX ORDER — MORPHINE SULFATE 4 MG/ML
4 INJECTION, SOLUTION INTRAMUSCULAR; INTRAVENOUS
Status: DISCONTINUED | OUTPATIENT
Start: 2020-02-04 | End: 2020-02-04 | Stop reason: HOSPADM

## 2020-02-04 RX ORDER — SCOLOPAMINE TRANSDERMAL SYSTEM 1 MG/1
1 PATCH, EXTENDED RELEASE TRANSDERMAL ONCE
Status: DISCONTINUED | OUTPATIENT
Start: 2020-02-04 | End: 2020-02-04 | Stop reason: HOSPADM

## 2020-02-04 RX ORDER — ONDANSETRON 2 MG/ML
4 INJECTION INTRAMUSCULAR; INTRAVENOUS EVERY 4 HOURS PRN
Status: DISCONTINUED | OUTPATIENT
Start: 2020-02-04 | End: 2020-02-04 | Stop reason: HOSPADM

## 2020-02-04 RX ORDER — MORPHINE SULFATE 4 MG/ML
2 INJECTION, SOLUTION INTRAMUSCULAR; INTRAVENOUS EVERY 5 MIN PRN
Status: DISCONTINUED | OUTPATIENT
Start: 2020-02-04 | End: 2020-02-04 | Stop reason: HOSPADM

## 2020-02-04 RX ORDER — FENTANYL CITRATE 50 UG/ML
INJECTION, SOLUTION INTRAMUSCULAR; INTRAVENOUS PRN
Status: DISCONTINUED | OUTPATIENT
Start: 2020-02-04 | End: 2020-02-04 | Stop reason: SDUPTHER

## 2020-02-04 RX ORDER — OXYCODONE HYDROCHLORIDE AND ACETAMINOPHEN 5; 325 MG/1; MG/1
2 TABLET ORAL EVERY 4 HOURS PRN
Status: DISCONTINUED | OUTPATIENT
Start: 2020-02-04 | End: 2020-02-04 | Stop reason: HOSPADM

## 2020-02-04 RX ORDER — DIPHENHYDRAMINE HYDROCHLORIDE 50 MG/ML
12.5 INJECTION INTRAMUSCULAR; INTRAVENOUS
Status: DISCONTINUED | OUTPATIENT
Start: 2020-02-04 | End: 2020-02-04 | Stop reason: HOSPADM

## 2020-02-04 RX ORDER — METOCLOPRAMIDE HYDROCHLORIDE 5 MG/ML
10 INJECTION INTRAMUSCULAR; INTRAVENOUS
Status: DISCONTINUED | OUTPATIENT
Start: 2020-02-04 | End: 2020-02-04 | Stop reason: HOSPADM

## 2020-02-04 RX ORDER — OXYCODONE HYDROCHLORIDE AND ACETAMINOPHEN 5; 325 MG/1; MG/1
1 TABLET ORAL EVERY 6 HOURS PRN
Qty: 20 TABLET | Refills: 0 | Status: SHIPPED | OUTPATIENT
Start: 2020-02-04 | End: 2020-02-09

## 2020-02-04 RX ORDER — LABETALOL 20 MG/4 ML (5 MG/ML) INTRAVENOUS SYRINGE
5 EVERY 10 MIN PRN
Status: DISCONTINUED | OUTPATIENT
Start: 2020-02-04 | End: 2020-02-04 | Stop reason: HOSPADM

## 2020-02-04 RX ORDER — HYDRALAZINE HYDROCHLORIDE 20 MG/ML
5 INJECTION INTRAMUSCULAR; INTRAVENOUS EVERY 10 MIN PRN
Status: DISCONTINUED | OUTPATIENT
Start: 2020-02-04 | End: 2020-02-04 | Stop reason: HOSPADM

## 2020-02-04 RX ORDER — LIDOCAINE HYDROCHLORIDE 10 MG/ML
INJECTION, SOLUTION INFILTRATION; PERINEURAL PRN
Status: DISCONTINUED | OUTPATIENT
Start: 2020-02-04 | End: 2020-02-04 | Stop reason: SDUPTHER

## 2020-02-04 RX ORDER — SODIUM CHLORIDE, SODIUM LACTATE, POTASSIUM CHLORIDE, CALCIUM CHLORIDE 600; 310; 30; 20 MG/100ML; MG/100ML; MG/100ML; MG/100ML
INJECTION, SOLUTION INTRAVENOUS CONTINUOUS PRN
Status: DISCONTINUED | OUTPATIENT
Start: 2020-02-04 | End: 2020-02-04 | Stop reason: SDUPTHER

## 2020-02-04 RX ORDER — ROCURONIUM BROMIDE 10 MG/ML
INJECTION, SOLUTION INTRAVENOUS PRN
Status: DISCONTINUED | OUTPATIENT
Start: 2020-02-04 | End: 2020-02-04 | Stop reason: SDUPTHER

## 2020-02-04 RX ADMIN — ONDANSETRON HYDROCHLORIDE 4 MG: 2 INJECTION, SOLUTION INTRAMUSCULAR; INTRAVENOUS at 12:17

## 2020-02-04 RX ADMIN — LIDOCAINE HYDROCHLORIDE 50 MG: 10 INJECTION, SOLUTION INFILTRATION; PERINEURAL at 10:50

## 2020-02-04 RX ADMIN — KETOROLAC TROMETHAMINE 30 MG: 30 INJECTION, SOLUTION INTRAMUSCULAR; INTRAVENOUS at 12:27

## 2020-02-04 RX ADMIN — FENTANYL CITRATE 100 MCG: 50 INJECTION INTRAMUSCULAR; INTRAVENOUS at 10:48

## 2020-02-04 RX ADMIN — HYDROMORPHONE HYDROCHLORIDE 0.25 MG: 1 INJECTION, SOLUTION INTRAMUSCULAR; INTRAVENOUS; SUBCUTANEOUS at 12:04

## 2020-02-04 RX ADMIN — SODIUM CHLORIDE, POTASSIUM CHLORIDE, SODIUM LACTATE AND CALCIUM CHLORIDE: 600; 310; 30; 20 INJECTION, SOLUTION INTRAVENOUS at 09:31

## 2020-02-04 RX ADMIN — ROCURONIUM BROMIDE 50 MG: 10 SOLUTION INTRAVENOUS at 10:50

## 2020-02-04 RX ADMIN — SUGAMMADEX 500 MG: 100 INJECTION, SOLUTION INTRAVENOUS at 12:18

## 2020-02-04 RX ADMIN — PROPOFOL 300 MG: 10 INJECTION, EMULSION INTRAVENOUS at 10:50

## 2020-02-04 RX ADMIN — DEXAMETHASONE SODIUM PHOSPHATE 10 MG: 10 INJECTION, SOLUTION INTRAMUSCULAR; INTRAVENOUS at 10:59

## 2020-02-04 RX ADMIN — MIDAZOLAM 2 MG: 1 INJECTION INTRAMUSCULAR; INTRAVENOUS at 10:43

## 2020-02-04 RX ADMIN — HYDROMORPHONE HYDROCHLORIDE 0.25 MG: 1 INJECTION, SOLUTION INTRAMUSCULAR; INTRAVENOUS; SUBCUTANEOUS at 12:02

## 2020-02-04 RX ADMIN — ROCURONIUM BROMIDE 30 MG: 10 SOLUTION INTRAVENOUS at 11:35

## 2020-02-04 RX ADMIN — SODIUM CHLORIDE, SODIUM LACTATE, POTASSIUM CHLORIDE, AND CALCIUM CHLORIDE: 600; 310; 30; 20 INJECTION, SOLUTION INTRAVENOUS at 10:45

## 2020-02-04 RX ADMIN — SODIUM CHLORIDE, SODIUM LACTATE, POTASSIUM CHLORIDE, AND CALCIUM CHLORIDE: 600; 310; 30; 20 INJECTION, SOLUTION INTRAVENOUS at 11:40

## 2020-02-04 RX ADMIN — Medication 2 G: at 11:02

## 2020-02-04 RX ADMIN — HYDROMORPHONE HYDROCHLORIDE 0.25 MG: 1 INJECTION, SOLUTION INTRAMUSCULAR; INTRAVENOUS; SUBCUTANEOUS at 12:07

## 2020-02-04 RX ADMIN — FENTANYL CITRATE 50 MCG: 50 INJECTION INTRAMUSCULAR; INTRAVENOUS at 11:55

## 2020-02-04 RX ADMIN — KETOROLAC TROMETHAMINE 15 MG: 30 INJECTION, SOLUTION INTRAMUSCULAR; INTRAVENOUS at 12:55

## 2020-02-04 RX ADMIN — FENTANYL CITRATE 100 MCG: 50 INJECTION INTRAMUSCULAR; INTRAVENOUS at 11:00

## 2020-02-04 RX ADMIN — HYDROMORPHONE HYDROCHLORIDE 0.25 MG: 1 INJECTION, SOLUTION INTRAMUSCULAR; INTRAVENOUS; SUBCUTANEOUS at 12:00

## 2020-02-04 ASSESSMENT — PAIN SCALES - GENERAL
PAINLEVEL_OUTOF10: 0

## 2020-02-04 ASSESSMENT — LIFESTYLE VARIABLES: SMOKING_STATUS: 0

## 2020-02-04 ASSESSMENT — ENCOUNTER SYMPTOMS: SHORTNESS OF BREATH: 0

## 2020-02-04 NOTE — INTERVAL H&P NOTE
H&P Update    Patient's History and Physical from February 3, 2020 was reviewed. Patient examined. There has been no change.     Electronically signed by Ansley Slade MD on 2/4/20 at 10:16 AM

## 2020-02-04 NOTE — H&P
Patricia Mann is a 34 y.o. male who presents today        Chief Complaint   Patient presents with    New Patient       I was seen in the ER today and I am here following up with a right testicular mass       Testicular mass  Patient is a 71-year-old male here for evaluation of a right testicular mass that was discovered today. Patient was seen in the emergency department with recent onset of lower back pain. He says this started about 2 to 3 days ago. He does have a prior history of back problems and back surgery. The pain is located in the lower back in the lumbosacral region but maybe just to the right side. He was seen approximately 0.5 years ago for symptoms of flank pain thought to be associated with a kidney stone. He first came in on February 1, 2020 with this back pain thinking it was a kidney stone after his pain was controlled he was released. He returned today CT scan was done and this showed a 5 cm interaortocaval retroperitoneal mass. This led to a testicular ultrasound which showed a right testis mass. Patient denies ever noticing a mass or lump in his right testicle. He denies any weight loss no nausea vomiting. No shortness of breath he has had some constipation associated with taking Percocet        Past Medical History        Past Medical History:   Diagnosis Date    Back pain      Hypertension      Lumbar stress fracture              Past Surgical History   History reviewed. No pertinent surgical history.        Current Facility-Administered Medications          Current Outpatient Medications   Medication Sig Dispense Refill    oxyCODONE-acetaminophen (PERCOCET) 5-325 MG per tablet Take 1 tablet by mouth every 6 hours as needed for Pain for up to 3 days. Intended supply: 3 days.  Take lowest dose possible to manage pain 12 tablet 0    promethazine (PHENERGAN) 25 MG tablet Take 1 tablet by mouth every 6 hours as needed for Nausea 20 tablet 0    albuterol sulfate  (90 Base) MCG/ACT inhaler USE 2 PUFFS Q 4 H PRN        naproxen (NAPROSYN) 500 MG tablet Take 1 tablet by mouth 2 times daily (with meals) for 10 days 20 tablet 0    ondansetron (ZOFRAN ODT) 4 MG disintegrating tablet Take 1 tablet by mouth every 8 hours as needed for Nausea or Vomiting 20 tablet 0    Hyoscyamine Sulfate SL (LEVSIN/SL) 0.125 MG SUBL Place 0.125 mg under the tongue every 4-6 hours as needed (abdominal cramping/pain) 15 each 0    HYDROcodone-acetaminophen (NORCO)  MG per tablet Take 1 tablet by mouth every 6 hours as needed for Pain.        lisinopril (PRINIVIL;ZESTRIL) 10 MG tablet Take 10 mg by mouth daily        montelukast (SINGULAIR) 10 MG tablet Take 10 mg by mouth nightly          No current facility-administered medications for this visit.                   Allergies   Allergen Reactions    Nuts [Peanut-Containing Drug Products]      Other         peanuts         Social History   Social History            Socioeconomic History    Marital status: Single       Spouse name: None    Number of children: None    Years of education: None    Highest education level: None   Occupational History    None   Social Needs    Financial resource strain: None    Food insecurity:       Worry: None       Inability: None    Transportation needs:       Medical: None       Non-medical: None   Tobacco Use    Smoking status: Former Smoker       Packs/day: 0.50    Smokeless tobacco: Never Used   Substance and Sexual Activity    Alcohol use: No    Drug use: No    Sexual activity: Never   Lifestyle    Physical activity:       Days per week: None       Minutes per session: None    Stress: None   Relationships    Social connections:       Talks on phone: None       Gets together: None       Attends Jehovah's witness service: None       Active member of club or organization: None       Attends meetings of clubs or organizations: None       Relationship status: None    Intimate partner violence:       Fear of distension. Palpations: Abdomen is soft. There is no mass. Tenderness: There is no abdominal tenderness. Hernia: There is no hernia in the right inguinal area or left inguinal area. Genitourinary:     Penis: Normal and circumcised. No phimosis or discharge. Scrotum/Testes:         Right: Mass present. Tenderness or swelling not present. Left: Mass, tenderness or swelling not present. Prostate: Normal. Not enlarged and not tender. Rectum: Normal.      Comments: Nodule mass lower pole the right testis. Intraparenchymal  Musculoskeletal: Normal range of motion. General: No tenderness. Lymphadenopathy:      Cervical: No cervical adenopathy. Skin:     General: Skin is warm and dry. Findings: Rash present. Comments: Skin rash right where his abdominal pannus and his weight meet where his belt would be   Neurological:      Mental Status: He is alert and oriented to person, place, and time.    Psychiatric:         Behavior: Behavior normal.                  DATA:  CBC:         Lab Results   Component Value Date     WBC 16.4 02/03/2020     RBC 5.26 02/03/2020     HGB 16.2 02/03/2020     HCT 48.6 02/03/2020     MCV 92.4 02/03/2020     MCH 30.8 02/03/2020     MCHC 33.3 02/03/2020     RDW 11.4 02/03/2020      02/03/2020     MPV 10.6 02/03/2020      CMP:          Lab Results   Component Value Date      02/03/2020     K 4.6 02/03/2020     CL 98 02/03/2020     CO2 24 02/03/2020     BUN 8 02/03/2020     CREATININE 0.5 02/03/2020     LABGLOM >60 02/03/2020     GLUCOSE 124 02/03/2020     PROT 7.0 02/03/2020     LABALBU 4.5 02/03/2020     CALCIUM 9.3 02/03/2020     BILITOT 2.0 02/03/2020     ALKPHOS 86 02/03/2020     AST 20 02/03/2020     ALT 19 02/03/2020            Results for orders placed or performed in visit on 02/03/20   POCT Urinalysis No Micro (Auto)   Result Value Ref Range     Color, UA yellow       Clarity, UA clear       Glucose, UA POC neg       Bilirubin, UA 0       Ketones, UA neg       Spec Grav, UA 1.015       Blood, UA POC neg       pH, UA 7.0       Protein, UA POC neg       Urobilinogen, UA 0.2       Leukocytes, UA neg       Nitrite, UA neg        Beta-hCG, tumor markers pending      LD 294High   91 - 215 U/L Final 02/03/2020  7:10 AM [facility]      Alpha Fetoprotein 2.7  0.0 - 8.3 ng/mL Final 02/03/2020  7:10 AM [facility]         IMAGING:  CT scan of the abdomen pelvis with contrast shows a 5 cm retroperitoneal interaortocaval mass consistent with retroperitoneal metastasis.     Testicular ultrasound done today shows a 3.1 x 1.8 x 2.3 cm mass within the right testicle. The larger hypodense distant with testis cancer.     Chest CT scan done today shows no pulmonary mediastinal or hilar metastasis or adenopathy.     1. Mass of right testis  This appears to be consistent with testis cancer with metastasis to the retroperitoneum without chest or pulmonary metastasis. He is already had tumor markers drawn though the hCG is pending. Discussed with the patient and his family and his fiancée today that he needs a right radical orchiectomy. He is willing to proceed. The risk and complications were discussed with him including the risk of wound infection hematoma, chronic inguinal pain from ilioinguinal nerve entrapment or scar. Inguinal hernia. We did discuss the fact that he does not have kids and he is engaged that fertility could be an issue however his fiancée has had a hysterectomy she has a child of her own that he is involved with and he says this is not of concern.   - POCT Urinalysis No Micro (Auto)     2. Cancer of descended right testis [facility])  Plan for right radical orchiectomy.            Orders Placed This Encounter   Procedures    POCT Urinalysis No Micro (Auto)         Return for PT to be scheduled for Surgery.     All information inputted into the note by the MA to include chief complaint, past medical history, past surgical history, medications, allergies, social and family history and review of systems has been reviewed and updated as needed by me.     EMR Dragon/transcription disclaimer: Much of this documentt is electronic  transcription/translation of spoken language to printed text. The  electronic translation of spoken language may be erroneous, or at times,  nonsensical words or phrases may be inadvertently transcribed.  Although I  have reviewed the document for such errors, some may still exist.

## 2020-02-04 NOTE — ANESTHESIA POSTPROCEDURE EVALUATION
Department of Anesthesiology  Postprocedure Note    Patient: Guillermina Van  MRN: [id number]  YOB: 1990  Date of evaluation: 2/4/2020  Time:  12:41 PM     Procedure Summary     Date:  02/04/20 Room / Location:  [address]    Anesthesia Start:  4318 Anesthesia Stop:      Procedure:  RIGHT RADICAL ORCHIECTOMY (Right ) Diagnosis:  (RIGHT TESTIS MASS)    Surgeon:  Oliver Sun MD Responsible Provider:  AMBAR Alarcon CRNA    Anesthesia Type:  general ASA Status:  2          Anesthesia Type: general    Nataliya Phase I: Nataliya Score: 10    Nataliya Phase II:      Last vitals: Reviewed and per EMR flowsheets.        Anesthesia Post Evaluation    Patient location during evaluation: PACU  Patient participation: complete - patient participated  Level of consciousness: sleepy but conscious  Pain score: 0  Airway patency: patent  Nausea & Vomiting: no vomiting and no nausea  Complications: no  Cardiovascular status: blood pressure returned to baseline and hemodynamically stable  Respiratory status: acceptable, face mask, spontaneous ventilation, oral airway and nonlabored ventilation  Hydration status: stable

## 2020-02-04 NOTE — BRIEF OP NOTE
Urology Brief Op Note    Patient Name: Marshalll Province    : 1990    MRN: [id number]    Pre-operative Diagnosis: RIGHT TESTIS MASS right testicle cancer    Post-operative Diagnosis: Same     Procedure: Procedure(s):  RIGHT RADICAL ORCHIECTOMY    Anesthesia: General    Surgeon: Queta Villegas MD    Assistants: [employer]: Campbell Hernandez; Duc Stapleton; LEONOR Yap; Brandy Branham    Estimated Blood Loss: 25 (units unknown)    Complications: none    Findings: Firm mass involving the lower half of the right testicle. No obvious gross extension. Right radical orchiectomy with high ligation of the cord at the end internal ring performed. Specimens:    ID Type Source Tests Collected by Time Destination   A : right testis Tissue Testis SURGICAL PATHOLOGY Urbano Baron MD 2020 1146        Disposition/Plan: To PACU in stable condition. Then to [facility] out patient. Follow-up in 2 weeks.     Queta Villegas MD  2020  12:40 PM

## 2020-02-04 NOTE — ANESTHESIA PRE PROCEDURE
chloride 50 mL IVPB  2 g Intravenous Once Scarlet Cardenas MD           Allergies: Allergies   Allergen Reactions    Nuts [Peanut-Containing Drug Products]     Other      peanuts       Problem List:  There is no problem list on file for this patient. Past Medical History:        Diagnosis Date    Back pain     Hypertension     Lumbar stress fracture        Past Surgical History:  History reviewed. No pertinent surgical history. Social History:    Social History     Tobacco Use    Smoking status: Former Smoker     Packs/day: 0.50    Smokeless tobacco: Never Used   Substance Use Topics    Alcohol use: No                                Counseling given: Not Answered      Vital Signs (Current):   Vitals:    02/04/20 0918   BP: 133/85   Pulse: 104   Resp: 16   Temp: 98 °F (36.7 °C)   TempSrc: Temporal   SpO2: 94%   Weight: 270 lb (122.5 kg)   Height: 5' 10\" (1.778 m)                                              BP Readings from Last 3 Encounters:   02/04/20 133/85   02/03/20 (!) 158/94   02/03/20 137/89       NPO Status: Time of last liquid consumption: 2300                        Time of last solid consumption: 2100                        Date of last liquid consumption: 02/03/20                        Date of last solid food consumption: 02/03/20    BMI:   Wt Readings from Last 3 Encounters:   02/04/20 270 lb (122.5 kg)   02/03/20 271 lb (122.9 kg)   02/03/20 270 lb (122.5 kg)     Body mass index is 38.74 kg/m².     CBC:   Lab Results   Component Value Date    WBC 16.4 02/03/2020    RBC 5.26 02/03/2020    HGB 16.2 02/03/2020    HCT 48.6 02/03/2020    MCV 92.4 02/03/2020    RDW 11.4 02/03/2020     02/03/2020       CMP:   Lab Results   Component Value Date     02/03/2020    K 4.6 02/03/2020    CL 98 02/03/2020    CO2 24 02/03/2020    BUN 8 02/03/2020    CREATININE 0.5 02/03/2020    LABGLOM >60 02/03/2020    GLUCOSE 124 02/03/2020    PROT 7.0 02/03/2020    CALCIUM 9.3 02/03/2020    BILITOT 2.0 02/03/2020    ALKPHOS 86 02/03/2020    AST 20 02/03/2020    ALT 19 02/03/2020       POC Tests: No results for input(s): POCGLU, POCNA, POCK, POCCL, POCBUN, POCHEMO, POCHCT in the last 72 hours. Coags:   Lab Results   Component Value Date    PROTIME 13.9 02/03/2020    INR 1.13 02/03/2020       HCG (If Applicable): No results found for: PREGTESTUR, PREGSERUM, HCG, HCGQUANT     ABGs: No results found for: PHART, PO2ART, TWJ4BSL, QPW1LVM, BEART, F1NISQJV     Type & Screen (If Applicable):  No results found for: LABABO, [address]    Anesthesia Evaluation  Patient summary reviewed and Nursing notes reviewed no history of anesthetic complications:   Airway: Mallampati: II  TM distance: >3 FB   Neck ROM: full  Mouth opening: > = 3 FB Dental: normal exam         Pulmonary:Negative Pulmonary ROS and normal exam  breath sounds clear to auscultation      (-) shortness of breath and not a current smoker          Patient did not smoke on day of surgery. Cardiovascular:    (+) hypertension:,     (-) CAD,  angina and  CHF    NYHA Classification: I  ECG reviewed  Rhythm: regular  Rate: normal           Beta Blocker:  Not on Beta Blocker         Neuro/Psych:   Negative Neuro/Psych ROS     (-) seizures, CVA and depression/anxiety            GI/Hepatic/Renal: Neg GI/Hepatic/Renal ROS  (+) morbid obesity     (-) hiatal hernia and GERD       Endo/Other: Negative Endo/Other ROS   (+) malignancy/cancer. Pt had PAT visit. Abdominal:   (+) obese,     Abdomen: soft. Vascular:                                        Anesthesia Plan      general     ASA 2     (Iv zofran within 30 min of closing )  Induction: intravenous. BIS  MIPS: Postoperative opioids intended and Prophylactic antiemetics administered. Anesthetic plan and risks discussed with patient. Use of blood products discussed with patient whom. Plan discussed with CRNA.     Attending anesthesiologist reviewed and agrees with Pre Eval

## 2020-02-05 ENCOUNTER — TELEPHONE (OUTPATIENT)
Dept: UROLOGY | Age: 30
End: 2020-02-05

## 2020-02-05 ENCOUNTER — CLINICAL DOCUMENTATION (OUTPATIENT)
Dept: HEMATOLOGY | Age: 30
End: 2020-02-05

## 2020-02-05 ENCOUNTER — HOSPITAL ENCOUNTER (OUTPATIENT)
Dept: INFUSION THERAPY | Age: 30
Discharge: HOME | End: 2020-02-05
Payer: COMMERCIAL

## 2020-02-05 ENCOUNTER — OFFICE VISIT (OUTPATIENT)
Dept: HEMATOLOGY | Age: 30
End: 2020-02-05
Payer: COMMERCIAL

## 2020-02-05 VITALS
OXYGEN SATURATION: 93 % | SYSTOLIC BLOOD PRESSURE: 140 MMHG | BODY MASS INDEX: 38.35 KG/M2 | WEIGHT: 267.9 LBS | DIASTOLIC BLOOD PRESSURE: 88 MMHG | HEIGHT: 70 IN | HEART RATE: 90 BPM

## 2020-02-05 DIAGNOSIS — C62.11 CANCER OF DESCENDED RIGHT TESTIS (HCC): ICD-10-CM

## 2020-02-05 PROCEDURE — 85025 COMPLETE CBC W/AUTO DIFF WBC: CPT

## 2020-02-05 PROCEDURE — 99406 BEHAV CHNG SMOKING 3-10 MIN: CPT | Performed by: INTERNAL MEDICINE

## 2020-02-05 PROCEDURE — 99202 OFFICE O/P NEW SF 15 MIN: CPT

## 2020-02-05 PROCEDURE — 99205 OFFICE O/P NEW HI 60 MIN: CPT | Performed by: INTERNAL MEDICINE

## 2020-02-05 NOTE — OP NOTE
ELVI [employer] [facility] [address], [address] [facility]                                OPERATIVE REPORT    PATIENT NAME: Yesenia Oquendo                      :        1990  MED REC NO:   [id number]                              ROOM:  ACCOUNT NO:   [de-identified]                           ADMIT DATE: 2020  PROVIDER:     North Hardy MD    DATE OF PROCEDURE:  2020    TITLE OF OPERATION:  Right radical orchiectomy. PREOPERATIVE DIAGNOSES:  1. Right testicular mass. 2.  Right testicular cancer with retroperitoneal metastasis. POSTOPERATIVE DIAGNOSES:  1. Right testicular mass. 2.  Right testicular cancer with retroperitoneal metastasis. ATTENDING SURGEON:  North Hardy MD    ANESTHESIA:  General anesthetic. HISTORY:  The patient is a 63-year-old male who recently presented to  the emergency room with back pain. He was actually seen again yesterday  for back pain. He has had a prior history of kidney stones, so a CT  scan was obtained, which showed a 5 cm retroperitoneal lymphadenopathy. Further examination revealed a mass in the right testicle. A testicular  ultrasound was performed and it showed a heterogenous mass  intraparenchymal consistent with a right testis cancer. So, therefore,  it appears he has a right testis cancer with retroperitoneal metastasis. He has had tumor markers drawn. The alpha fetoprotein was normal, beta  HCG is pending, LDH was elevated. He now presents to undergo right  radical orchiectomy. The risks and complications have been discussed  with him including the risk of ilioinguinal nerve injury or entrapment  causing chronic pain or numbness. We also discussed the risk of scrotal  hematoma and infection. We also discussed the implications of  orchiectomy and treatment for testis cancer resulting in infertility. He was not interested in sperm banking.   We discussed the risk of wound  infection, chronic inguinal pain, etc.  He understands and agrees to  proceed. OPERATIVE PROCEDURE:  The patient was brought to the operating room,  underwent general anesthetic. His right lower suprapubic region and  inguinal region as well as scrotum and genitalia were shaved, prepped,  and draped in routine sterile fashion. First, we began by marking  landmarks with the anterior superior iliac spine and the pubic tubercle. He is somewhat of a thick boy. I marked an incision over the inguinal  region from the level of the pubic tubercle laterally. This was taken  down through the skin and the subcutaneous tissue with a scalpel. I  encountered some subcutaneous vessels, which were cauterized and  divided. I then opened up the Wale's fascia and this allowed me then  with retractors to expose and identify the external ring and the  aponeurosis of the external oblique. This was cleaned off. A  self-retaining retractor was then placed. I then made an incision in  the aponeurosis about the level of the internal ring and this was opened  all the way down to the external ring exposing the cord. I then  carefully got my finger behind the cord, and a Penrose drain was then  completely encircled around the cord and then clamped. This was just at  or above the level of the pubic tubercle. I then cleaned off the cord  until it was completely freed up from the inguinal canal.  I then pushed  the testicle from the scrotum up into the wound. The attachments to the  scrotal wall were carefully dissected away and divided. The  gubernaculum was also divided keeping the testicle intact within the  tunica vaginalis. I then placed blue towels to protect the field. I  then dissected the cord away from the inguinal canal by taking down the  cremasteric fascia and the muscle until I was all the way up to the  internal ring. He had some fat pushing out from the internal ring. This was divided carefully with the cautery. Then, I was able to  retract this to further expose the cord up to the level of the internal  ring to perform a high ligation. I divided the cord into 2 bundles, one  containing the vessels and one containing the vas. Right angle clamps  were placed across each bundle. I then placed a Michelle clamp above the  right angles, and the cord was then divided to amputate the testicle. The testicle and cord were sent for pathologic examination. I then  ligated each bundle separately with a 0 silk tie and a 2-0 silk suture  ligature. Once I had done this, I tied some of the ties together to  create a knot cluster so that the end of the cord could be identified at  a later date if necessary. The cord then retracted up into the internal  ring. I then copiously irrigated the wound with antibiotic-containing  irrigation solution. A 50:50 mixture of 1% lidocaine and 0.5% Marcaine  was then used to do a regional block infiltrating the upper extent of  the inguinal incision and the inguinal canal.  I never did identify the  ilioinguinal nerve. I never saw it. Once this was accomplished, then I  approximated the aponeurosis, the 2 edges that I had opened with a  running stitch of 0 Vicryl from above the internal ring down to the  external ring at the level of the pubic tubercle. The wound was then  copiously irrigated with antibiotic-containing irrigation solution. The  Wale's fascia was then approximated using a running stitch of 2-0  chromic. The subcutaneous tissue above the Wale's was also  approximated with a running stitch of 2-0 chromic. I did inject the  subcutaneous tissue and the subdermal tissue to provide a block again  with the same mixture of lidocaine and Marcaine. The wounds were  copiously irrigated with antibiotic-containing irrigation solution. The  skin was closed using a running subcuticular stitch of 4-0 Monocryl. A  Prineo Dermabond dressing was then placed.   The sponge, needle, and  instrument counts were correct. Estimated blood loss was 25 mL. The  specimen was the right testis and cord and sent for pathologic  examination. He tolerated the procedure without complications. He was  awakened from the anesthetic and taken to the recovery room in stable  condition.         Mu Cantu MD    D: 02/04/2020 13:37:45      T: 02/05/2020 0:41:09     [id number]  Job#: [id number]     Doc#: [id number]    CC:

## 2020-02-05 NOTE — TELEPHONE ENCOUNTER
Guera Geneva requests that office  return their call. The best time to reach him is Anytime. Follow-Ups: Follow up with Chris Duffy MD (Urology) in 2 weeks       Thank you.

## 2020-02-05 NOTE — PROGRESS NOTES
Please build the following regimen:    Etoposide 100 mg/m² day 1-day 5  Cisplatin 20 mg/m2 day 1-day 5  Q. 21 days  X4 cycles    Treatment is scheduled on 2/10/2020 as outpatient.

## 2020-02-07 NOTE — PROGRESS NOTES
HEMATOLOGY ONCOLOGY PROGRESS NOTE    Pt Name: Elsa Bettencourt  MRN: [id number]  YOB: 1990  Date of evaluation: 2/10/2020    HISTORY OF PRESENT ILLNESS:      Diagnosis  · Pure seminoma, February 2020  · [id number], stage IIB  · Good risk    Treatment summary  · 2/4/2020- right orchiectomy  · 2/10/2020-4 cycles etoposide/cisplatin    The patient is a pleasant 34years old male who has been diagnosed with pure seminoma of the right testicle. He is status post right orchiectomy. His pathology came back as pure seminoma. Therefore I have recommended 4 cycles of cisplatin/etoposide. He is here today to discuss the side effects and treatment plan. He has several questions regarding management of his disease. Cancer history  Mr. Elsa Bettencourt was first seen by me on 2/5/2020. He presented to the ER department with complaints of back pain on 2/4/2020. A CT of the abdomen revealed a retroperitoneal mass. Further examination also revealed a right testicular mass. Urology was consulted and performed a right orchiectomy on 2/4/2020 consistent with pure seminoma. · 2/3/2020- CT of the abdomen showed 5 cm retroperitoneal lymph node mass within the upper abdomen in the aortocaval location. No liver metastasis. · 2/3/2020-CT of the chest showed no evidence of intrathoracic metastatic disease. · 2/4/2020-right orchiectomy by Dr. Ankita Upton at [facility] consistent with pure seminoma. Tumor measuring 2.5 cm and confined to the testis. Spermatic cord margin negative.   Final pathologic staging [id number], S0 stage IIB  · 2/5/2020-recommended 4 cycles of etoposide 100 mg/m2 days 1-5 and cisplatin 20mg/m2 days 1-5 × 4 cycles q. 21 days      Past Medical History:    Past Medical History:   Diagnosis Date    Back pain     Hypertension     Lumbar stress fracture    History of asthma  Tobacco abuse x12 years    Past Surgical History:      · Right orchiectomy no fever, no night sweats, no fatigue;  HEENT: no blurring of vision, no double vision, no hearing difficulty, no tinnitus, no ulceration, no dysplasia, no epistaxis;  LUNGS: no cough, no hemoptysis, no wheeze,  no shortness of breath;  CARDIOVASCULAR: no palpitation, no chest pain, no shortness of breath;  GI: no abdominal pain, no nausea, no vomiting, no diarrhea, no constipation;  RISHI: no dysuria, no hematuria, no frequency or urgency, no nephrolithiasis;  MUSCULOSKELETAL: no joint pain, no swelling, no stiffness; back pain  ENDOCRINE: no polyuria, no polydipsia, no cold or heat intolerance;  HEMATOLOGY: no easy bruising or bleeding, no history of clotting disorder;  DERMATOLOGY: no skin rash, no eczema, no pruritus;  PSYCHIATRY: no depression, no anxiety, no panic attacks, no suicidal ideation, no homicidal ideation;  NEUROLOGY: no syncope, no seizures, no numbness or tingling of hands, no numbness or tingling of feet, no paresis;    Objective   /86   Pulse 90   Ht 5' 10\" (1.778 m)   Wt 267 lb (121.1 kg)   SpO2 96%   BMI 38.31 kg/m²     PHYSICAL EXAM:  CONSTITUTIONAL: Alert, appropriate, no acute distress  EYES: Non icteric, EOM intact, pupils equal round   ENT: Mucus membranes moist, no oral pharyngeal lesions, external inspection of ears and nose are normal  NECK: Supple, no masses. No palpable thyroid mass  CHEST/LUNGS: CTA bilaterally, normal respiratory effort   CARDIOVASCULAR: RRR, no murmurs. No lower extremity edema  ABDOMEN: soft non-tender, active bowel sounds, no HSM. No palpable masses  EXTREMITIES: warm, full ROM in all 4 extremities, no focal weakness. SKIN: warm, dry with no rashes or lesions  LYMPH: No cervical, clavicular, axillary, or inguinal lymphadenopathy  NEUROLOGIC: follows commands, non focal   PSYCH: mood and affect appropriate.   Alert and oriented to time, place, person      LABORATORY RESULTS REVIEWED BY

## 2020-02-10 ENCOUNTER — HOSPITAL ENCOUNTER (OUTPATIENT)
Dept: INFUSION THERAPY | Age: 30
Setting detail: INFUSION SERIES
Discharge: HOME | End: 2020-02-10
Payer: COMMERCIAL

## 2020-02-10 ENCOUNTER — HOSPITAL ENCOUNTER (OUTPATIENT)
Dept: INFUSION THERAPY | Age: 30
Discharge: HOME | End: 2020-02-10
Payer: COMMERCIAL

## 2020-02-10 ENCOUNTER — OFFICE VISIT (OUTPATIENT)
Dept: HEMATOLOGY | Age: 30
End: 2020-02-10
Payer: COMMERCIAL

## 2020-02-10 VITALS
TEMPERATURE: 97.4 F | SYSTOLIC BLOOD PRESSURE: 121 MMHG | RESPIRATION RATE: 18 BRPM | HEART RATE: 77 BPM | DIASTOLIC BLOOD PRESSURE: 72 MMHG | OXYGEN SATURATION: 96 %

## 2020-02-10 VITALS
HEIGHT: 70 IN | OXYGEN SATURATION: 96 % | BODY MASS INDEX: 38.22 KG/M2 | HEART RATE: 90 BPM | DIASTOLIC BLOOD PRESSURE: 86 MMHG | SYSTOLIC BLOOD PRESSURE: 128 MMHG | WEIGHT: 267 LBS

## 2020-02-10 DIAGNOSIS — N50.89 MASS OF RIGHT TESTICLE: Primary | ICD-10-CM

## 2020-02-10 DIAGNOSIS — C62.11 CANCER OF DESCENDED RIGHT TESTIS (HCC): ICD-10-CM

## 2020-02-10 LAB
ALBUMIN SERPL-MCNC: 4.3 G/DL (ref 3.5–5.2)
ALP BLD-CCNC: 93 U/L (ref 40–130)
ALPHA FETOPROTEIN: 2.4 NG/ML (ref 0–8.3)
ALT SERPL-CCNC: 21 U/L (ref 5–41)
ANION GAP SERPL CALCULATED.3IONS-SCNC: 15 MMOL/L (ref 7–19)
AST SERPL-CCNC: 15 U/L (ref 5–40)
BILIRUB SERPL-MCNC: 0.4 MG/DL (ref 0.2–1.2)
BUN BLDV-MCNC: 11 MG/DL (ref 6–20)
CALCIUM SERPL-MCNC: 9.6 MG/DL (ref 8.6–10)
CHLORIDE BLD-SCNC: 100 MMOL/L (ref 98–111)
CO2: 24 MMOL/L (ref 22–29)
CREAT SERPL-MCNC: 0.7 MG/DL (ref 0.5–1.2)
GFR NON-AFRICAN AMERICAN: >60
GLUCOSE BLD-MCNC: 89 MG/DL (ref 74–109)
LACTATE DEHYDROGENASE: 229 U/L (ref 91–215)
POTASSIUM SERPL-SCNC: 4.2 MMOL/L (ref 3.5–5)
SODIUM BLD-SCNC: 139 MMOL/L (ref 136–145)
TOTAL PROTEIN: 7.9 G/DL (ref 6.6–8.7)

## 2020-02-10 PROCEDURE — 83615 LACTATE (LD) (LDH) ENZYME: CPT

## 2020-02-10 PROCEDURE — 82105 ALPHA-FETOPROTEIN SERUM: CPT

## 2020-02-10 PROCEDURE — 6360000002 HC RX W HCPCS: Performed by: INTERNAL MEDICINE

## 2020-02-10 PROCEDURE — 2580000003 HC RX 258: Performed by: INTERNAL MEDICINE

## 2020-02-10 PROCEDURE — 84704 HCG FREE BETACHAIN TEST: CPT

## 2020-02-10 PROCEDURE — 96417 CHEMO IV INFUS EACH ADDL SEQ: CPT

## 2020-02-10 PROCEDURE — 96413 CHEMO IV INFUSION 1 HR: CPT

## 2020-02-10 PROCEDURE — 99212 OFFICE O/P EST SF 10 MIN: CPT

## 2020-02-10 PROCEDURE — 99214 OFFICE O/P EST MOD 30 MIN: CPT | Performed by: INTERNAL MEDICINE

## 2020-02-10 PROCEDURE — 85025 COMPLETE CBC W/AUTO DIFF WBC: CPT

## 2020-02-10 PROCEDURE — 80053 COMPREHEN METABOLIC PANEL: CPT

## 2020-02-10 RX ORDER — DIPHENHYDRAMINE HYDROCHLORIDE 50 MG/ML
50 INJECTION INTRAMUSCULAR; INTRAVENOUS ONCE
Status: CANCELLED | OUTPATIENT
Start: 2020-02-14

## 2020-02-10 RX ORDER — SODIUM CHLORIDE 0.9 % (FLUSH) 0.9 %
10 SYRINGE (ML) INJECTION PRN
Status: DISCONTINUED | OUTPATIENT
Start: 2020-02-10 | End: 2020-02-11 | Stop reason: HOSPADM

## 2020-02-10 RX ORDER — METHYLPREDNISOLONE SODIUM SUCCINATE 125 MG/2ML
125 INJECTION, POWDER, LYOPHILIZED, FOR SOLUTION INTRAMUSCULAR; INTRAVENOUS ONCE
Status: CANCELLED | OUTPATIENT
Start: 2020-02-10

## 2020-02-10 RX ORDER — EPINEPHRINE 1 MG/ML
0.3 INJECTION, SOLUTION, CONCENTRATE INTRAVENOUS PRN
Status: CANCELLED | OUTPATIENT
Start: 2020-02-10

## 2020-02-10 RX ORDER — EPINEPHRINE 1 MG/ML
0.3 INJECTION, SOLUTION, CONCENTRATE INTRAVENOUS PRN
Status: CANCELLED | OUTPATIENT
Start: 2020-02-13

## 2020-02-10 RX ORDER — HEPARIN SODIUM (PORCINE) LOCK FLUSH IV SOLN 100 UNIT/ML 100 UNIT/ML
500 SOLUTION INTRAVENOUS PRN
Status: DISCONTINUED | OUTPATIENT
Start: 2020-02-10 | End: 2020-02-11 | Stop reason: HOSPADM

## 2020-02-10 RX ORDER — SODIUM CHLORIDE 9 MG/ML
INJECTION, SOLUTION INTRAVENOUS CONTINUOUS
Status: CANCELLED | OUTPATIENT
Start: 2020-02-14

## 2020-02-10 RX ORDER — PALONOSETRON 0.05 MG/ML
0.25 INJECTION, SOLUTION INTRAVENOUS ONCE
Status: COMPLETED | OUTPATIENT
Start: 2020-02-10 | End: 2020-02-10

## 2020-02-10 RX ORDER — SODIUM CHLORIDE 0.9 % (FLUSH) 0.9 %
10 SYRINGE (ML) INJECTION PRN
Status: CANCELLED | OUTPATIENT
Start: 2020-02-14

## 2020-02-10 RX ORDER — DIPHENHYDRAMINE HYDROCHLORIDE 50 MG/ML
50 INJECTION INTRAMUSCULAR; INTRAVENOUS ONCE
Status: CANCELLED | OUTPATIENT
Start: 2020-02-10

## 2020-02-10 RX ORDER — PALONOSETRON 0.05 MG/ML
0.25 INJECTION, SOLUTION INTRAVENOUS ONCE
Status: CANCELLED | OUTPATIENT
Start: 2020-02-10

## 2020-02-10 RX ORDER — SODIUM CHLORIDE 9 MG/ML
20 INJECTION, SOLUTION INTRAVENOUS ONCE
Status: CANCELLED | OUTPATIENT
Start: 2020-02-13

## 2020-02-10 RX ORDER — DEXAMETHASONE SODIUM PHOSPHATE 10 MG/ML
10 INJECTION, SOLUTION INTRAMUSCULAR; INTRAVENOUS ONCE
Status: COMPLETED | OUTPATIENT
Start: 2020-02-10 | End: 2020-02-10

## 2020-02-10 RX ORDER — SODIUM CHLORIDE 0.9 % (FLUSH) 0.9 %
5 SYRINGE (ML) INJECTION PRN
Status: CANCELLED | OUTPATIENT
Start: 2020-02-10

## 2020-02-10 RX ORDER — HEPARIN SODIUM (PORCINE) LOCK FLUSH IV SOLN 100 UNIT/ML 100 UNIT/ML
500 SOLUTION INTRAVENOUS PRN
Status: CANCELLED | OUTPATIENT
Start: 2020-02-12

## 2020-02-10 RX ORDER — SODIUM CHLORIDE 9 MG/ML
INJECTION, SOLUTION INTRAVENOUS CONTINUOUS
Status: CANCELLED | OUTPATIENT
Start: 2020-02-13

## 2020-02-10 RX ORDER — SODIUM CHLORIDE 9 MG/ML
20 INJECTION, SOLUTION INTRAVENOUS ONCE
Status: CANCELLED | OUTPATIENT
Start: 2020-02-10

## 2020-02-10 RX ORDER — SODIUM CHLORIDE 9 MG/ML
INJECTION, SOLUTION INTRAVENOUS CONTINUOUS
Status: CANCELLED | OUTPATIENT
Start: 2020-02-10

## 2020-02-10 RX ORDER — EPINEPHRINE 1 MG/ML
0.3 INJECTION, SOLUTION, CONCENTRATE INTRAVENOUS PRN
Status: CANCELLED | OUTPATIENT
Start: 2020-02-12

## 2020-02-10 RX ORDER — HEPARIN SODIUM (PORCINE) LOCK FLUSH IV SOLN 100 UNIT/ML 100 UNIT/ML
500 SOLUTION INTRAVENOUS PRN
Status: CANCELLED | OUTPATIENT
Start: 2020-02-11

## 2020-02-10 RX ORDER — HEPARIN SODIUM (PORCINE) LOCK FLUSH IV SOLN 100 UNIT/ML 100 UNIT/ML
500 SOLUTION INTRAVENOUS PRN
Status: CANCELLED | OUTPATIENT
Start: 2020-02-14

## 2020-02-10 RX ORDER — SODIUM CHLORIDE 0.9 % (FLUSH) 0.9 %
10 SYRINGE (ML) INJECTION PRN
Status: CANCELLED | OUTPATIENT
Start: 2020-02-11

## 2020-02-10 RX ORDER — SODIUM CHLORIDE 0.9 % (FLUSH) 0.9 %
10 SYRINGE (ML) INJECTION PRN
Status: CANCELLED | OUTPATIENT
Start: 2020-02-12

## 2020-02-10 RX ORDER — SODIUM CHLORIDE 9 MG/ML
20 INJECTION, SOLUTION INTRAVENOUS ONCE
Status: CANCELLED | OUTPATIENT
Start: 2020-02-11

## 2020-02-10 RX ORDER — HEPARIN SODIUM (PORCINE) LOCK FLUSH IV SOLN 100 UNIT/ML 100 UNIT/ML
500 SOLUTION INTRAVENOUS PRN
Status: CANCELLED | OUTPATIENT
Start: 2020-02-13

## 2020-02-10 RX ORDER — SODIUM CHLORIDE 0.9 % (FLUSH) 0.9 %
5 SYRINGE (ML) INJECTION PRN
Status: CANCELLED | OUTPATIENT
Start: 2020-02-12

## 2020-02-10 RX ORDER — EPINEPHRINE 1 MG/ML
0.3 INJECTION, SOLUTION, CONCENTRATE INTRAVENOUS PRN
Status: CANCELLED | OUTPATIENT
Start: 2020-02-11

## 2020-02-10 RX ORDER — SODIUM CHLORIDE 9 MG/ML
20 INJECTION, SOLUTION INTRAVENOUS ONCE
Status: CANCELLED | OUTPATIENT
Start: 2020-02-12

## 2020-02-10 RX ORDER — FUROSEMIDE 10 MG/ML
20 INJECTION INTRAMUSCULAR; INTRAVENOUS ONCE
Status: COMPLETED | OUTPATIENT
Start: 2020-02-10 | End: 2020-02-10

## 2020-02-10 RX ORDER — SODIUM CHLORIDE 0.9 % (FLUSH) 0.9 %
5 SYRINGE (ML) INJECTION PRN
Status: CANCELLED | OUTPATIENT
Start: 2020-02-14

## 2020-02-10 RX ORDER — SODIUM CHLORIDE 0.9 % (FLUSH) 0.9 %
5 SYRINGE (ML) INJECTION PRN
Status: CANCELLED | OUTPATIENT
Start: 2020-02-13

## 2020-02-10 RX ORDER — SODIUM CHLORIDE 9 MG/ML
INJECTION, SOLUTION INTRAVENOUS CONTINUOUS
Status: CANCELLED | OUTPATIENT
Start: 2020-02-11

## 2020-02-10 RX ORDER — DIPHENHYDRAMINE HYDROCHLORIDE 50 MG/ML
50 INJECTION INTRAMUSCULAR; INTRAVENOUS ONCE
Status: CANCELLED | OUTPATIENT
Start: 2020-02-11

## 2020-02-10 RX ORDER — DIPHENHYDRAMINE HYDROCHLORIDE 50 MG/ML
50 INJECTION INTRAMUSCULAR; INTRAVENOUS ONCE
Status: CANCELLED | OUTPATIENT
Start: 2020-02-13

## 2020-02-10 RX ORDER — SODIUM CHLORIDE 0.9 % (FLUSH) 0.9 %
10 SYRINGE (ML) INJECTION PRN
Status: CANCELLED | OUTPATIENT
Start: 2020-02-13

## 2020-02-10 RX ORDER — SODIUM CHLORIDE 0.9 % (FLUSH) 0.9 %
5 SYRINGE (ML) INJECTION PRN
Status: CANCELLED | OUTPATIENT
Start: 2020-02-11

## 2020-02-10 RX ORDER — HEPARIN SODIUM (PORCINE) LOCK FLUSH IV SOLN 100 UNIT/ML 100 UNIT/ML
500 SOLUTION INTRAVENOUS PRN
Status: CANCELLED | OUTPATIENT
Start: 2020-02-10

## 2020-02-10 RX ORDER — DIPHENHYDRAMINE HYDROCHLORIDE 50 MG/ML
50 INJECTION INTRAMUSCULAR; INTRAVENOUS ONCE
Status: CANCELLED | OUTPATIENT
Start: 2020-02-12

## 2020-02-10 RX ORDER — METHYLPREDNISOLONE SODIUM SUCCINATE 125 MG/2ML
125 INJECTION, POWDER, LYOPHILIZED, FOR SOLUTION INTRAMUSCULAR; INTRAVENOUS ONCE
Status: CANCELLED | OUTPATIENT
Start: 2020-02-11

## 2020-02-10 RX ORDER — METHYLPREDNISOLONE SODIUM SUCCINATE 125 MG/2ML
125 INJECTION, POWDER, LYOPHILIZED, FOR SOLUTION INTRAMUSCULAR; INTRAVENOUS ONCE
Status: CANCELLED | OUTPATIENT
Start: 2020-02-12

## 2020-02-10 RX ORDER — SODIUM CHLORIDE 9 MG/ML
20 INJECTION, SOLUTION INTRAVENOUS ONCE
Status: COMPLETED | OUTPATIENT
Start: 2020-02-10 | End: 2020-02-10

## 2020-02-10 RX ORDER — PALONOSETRON 0.05 MG/ML
0.25 INJECTION, SOLUTION INTRAVENOUS ONCE
Status: CANCELLED | OUTPATIENT
Start: 2020-02-13

## 2020-02-10 RX ORDER — EPINEPHRINE 1 MG/ML
0.3 INJECTION, SOLUTION, CONCENTRATE INTRAVENOUS PRN
Status: CANCELLED | OUTPATIENT
Start: 2020-02-14

## 2020-02-10 RX ORDER — SODIUM CHLORIDE 0.9 % (FLUSH) 0.9 %
10 SYRINGE (ML) INJECTION PRN
Status: CANCELLED | OUTPATIENT
Start: 2020-02-10

## 2020-02-10 RX ORDER — METHYLPREDNISOLONE SODIUM SUCCINATE 125 MG/2ML
125 INJECTION, POWDER, LYOPHILIZED, FOR SOLUTION INTRAMUSCULAR; INTRAVENOUS ONCE
Status: CANCELLED | OUTPATIENT
Start: 2020-02-13

## 2020-02-10 RX ORDER — METHYLPREDNISOLONE SODIUM SUCCINATE 125 MG/2ML
125 INJECTION, POWDER, LYOPHILIZED, FOR SOLUTION INTRAMUSCULAR; INTRAVENOUS ONCE
Status: CANCELLED | OUTPATIENT
Start: 2020-02-14

## 2020-02-10 RX ORDER — SODIUM CHLORIDE 9 MG/ML
INJECTION, SOLUTION INTRAVENOUS CONTINUOUS
Status: CANCELLED | OUTPATIENT
Start: 2020-02-12

## 2020-02-10 RX ORDER — SODIUM CHLORIDE 9 MG/ML
20 INJECTION, SOLUTION INTRAVENOUS ONCE
Status: CANCELLED | OUTPATIENT
Start: 2020-02-14

## 2020-02-10 RX ADMIN — SODIUM CHLORIDE 20 ML/HR: 9 INJECTION, SOLUTION INTRAVENOUS at 11:00

## 2020-02-10 RX ADMIN — DEXAMETHASONE SODIUM PHOSPHATE 10 MG: 10 INJECTION, SOLUTION INTRAMUSCULAR; INTRAVENOUS at 10:45

## 2020-02-10 RX ADMIN — SODIUM CHLORIDE 150 MG: 900 INJECTION, SOLUTION INTRAVENOUS at 10:22

## 2020-02-10 RX ADMIN — ETOPOSIDE 246 MG: 20 INJECTION, SOLUTION, CONCENTRATE INTRAVENOUS at 12:13

## 2020-02-10 RX ADMIN — FUROSEMIDE 20 MG: 10 INJECTION, SOLUTION INTRAMUSCULAR; INTRAVENOUS at 12:03

## 2020-02-10 RX ADMIN — CISPLATIN: 1 INJECTION INTRAVENOUS at 11:01

## 2020-02-10 RX ADMIN — PALONOSETRON HYDROCHLORIDE 0.25 MG: 0.25 INJECTION, SOLUTION INTRAVENOUS at 10:45

## 2020-02-11 ENCOUNTER — HOSPITAL ENCOUNTER (OUTPATIENT)
Dept: INFUSION THERAPY | Age: 30
Setting detail: INFUSION SERIES
Discharge: HOME | End: 2020-02-11
Payer: COMMERCIAL

## 2020-02-11 VITALS
SYSTOLIC BLOOD PRESSURE: 128 MMHG | RESPIRATION RATE: 18 BRPM | TEMPERATURE: 97.5 F | BODY MASS INDEX: 38.31 KG/M2 | DIASTOLIC BLOOD PRESSURE: 74 MMHG | HEART RATE: 94 BPM | WEIGHT: 267 LBS | OXYGEN SATURATION: 97 %

## 2020-02-11 DIAGNOSIS — N50.89 MASS OF RIGHT TESTICLE: Primary | ICD-10-CM

## 2020-02-11 DIAGNOSIS — C62.11 CANCER OF DESCENDED RIGHT TESTIS (HCC): ICD-10-CM

## 2020-02-11 PROCEDURE — 6360000002 HC RX W HCPCS

## 2020-02-11 PROCEDURE — 96413 CHEMO IV INFUSION 1 HR: CPT

## 2020-02-11 PROCEDURE — 6360000002 HC RX W HCPCS: Performed by: INTERNAL MEDICINE

## 2020-02-11 PROCEDURE — 96417 CHEMO IV INFUS EACH ADDL SEQ: CPT

## 2020-02-11 PROCEDURE — 2580000003 HC RX 258: Performed by: INTERNAL MEDICINE

## 2020-02-11 RX ORDER — DEXAMETHASONE SODIUM PHOSPHATE 10 MG/ML
10 INJECTION, SOLUTION INTRAMUSCULAR; INTRAVENOUS ONCE
Status: COMPLETED | OUTPATIENT
Start: 2020-02-11 | End: 2020-02-11

## 2020-02-11 RX ORDER — SODIUM CHLORIDE 9 MG/ML
20 INJECTION, SOLUTION INTRAVENOUS ONCE
Status: DISCONTINUED | OUTPATIENT
Start: 2020-02-11 | End: 2020-02-13 | Stop reason: HOSPADM

## 2020-02-11 RX ORDER — FUROSEMIDE 10 MG/ML
20 INJECTION INTRAMUSCULAR; INTRAVENOUS ONCE
Status: COMPLETED | OUTPATIENT
Start: 2020-02-11 | End: 2020-02-11

## 2020-02-11 RX ORDER — DEXAMETHASONE SODIUM PHOSPHATE 10 MG/ML
INJECTION, SOLUTION INTRAMUSCULAR; INTRAVENOUS
Status: COMPLETED
Start: 2020-02-11 | End: 2020-02-11

## 2020-02-11 RX ADMIN — DEXAMETHASONE SODIUM PHOSPHATE 10 MG: 10 INJECTION, SOLUTION INTRAMUSCULAR; INTRAVENOUS at 10:10

## 2020-02-11 RX ADMIN — FUROSEMIDE 20 MG: 10 INJECTION, SOLUTION INTRAMUSCULAR; INTRAVENOUS at 10:42

## 2020-02-11 RX ADMIN — DEXAMETHASONE SODIUM PHOSPHATE 10 MG: 10 INJECTION, SOLUTION INTRAMUSCULAR; INTRAVENOUS at 09:37

## 2020-02-11 RX ADMIN — ETOPOSIDE 246 MG: 20 INJECTION, SOLUTION, CONCENTRATE INTRAVENOUS at 11:14

## 2020-02-11 RX ADMIN — CISPLATIN: 1 INJECTION INTRAVENOUS at 10:11

## 2020-02-12 ENCOUNTER — HOSPITAL ENCOUNTER (OUTPATIENT)
Dept: INFUSION THERAPY | Age: 30
Setting detail: INFUSION SERIES
Discharge: HOME | End: 2020-02-12
Payer: COMMERCIAL

## 2020-02-12 ENCOUNTER — TELEPHONE (OUTPATIENT)
Dept: UROLOGY | Age: 30
End: 2020-02-12

## 2020-02-12 VITALS
SYSTOLIC BLOOD PRESSURE: 126 MMHG | DIASTOLIC BLOOD PRESSURE: 80 MMHG | WEIGHT: 267 LBS | BODY MASS INDEX: 38.31 KG/M2 | TEMPERATURE: 98.2 F

## 2020-02-12 DIAGNOSIS — C62.11 CANCER OF DESCENDED RIGHT TESTIS (HCC): ICD-10-CM

## 2020-02-12 DIAGNOSIS — N50.89 MASS OF RIGHT TESTICLE: Primary | ICD-10-CM

## 2020-02-12 LAB — HCG TUMOR MARKER: <1 IU/L (ref 0–3)

## 2020-02-12 PROCEDURE — 6360000002 HC RX W HCPCS: Performed by: INTERNAL MEDICINE

## 2020-02-12 PROCEDURE — 2580000003 HC RX 258: Performed by: INTERNAL MEDICINE

## 2020-02-12 RX ORDER — SODIUM CHLORIDE 9 MG/ML
20 INJECTION, SOLUTION INTRAVENOUS ONCE
Status: COMPLETED | OUTPATIENT
Start: 2020-02-12 | End: 2020-02-12

## 2020-02-12 RX ORDER — DEXAMETHASONE SODIUM PHOSPHATE 10 MG/ML
10 INJECTION, SOLUTION INTRAMUSCULAR; INTRAVENOUS ONCE
Status: COMPLETED | OUTPATIENT
Start: 2020-02-12 | End: 2020-02-12

## 2020-02-12 RX ORDER — FUROSEMIDE 10 MG/ML
20 INJECTION INTRAMUSCULAR; INTRAVENOUS ONCE
Status: COMPLETED | OUTPATIENT
Start: 2020-02-12 | End: 2020-02-12

## 2020-02-12 RX ADMIN — DEXAMETHASONE SODIUM PHOSPHATE 10 MG: 10 INJECTION, SOLUTION INTRAMUSCULAR; INTRAVENOUS at 08:52

## 2020-02-12 RX ADMIN — CISPLATIN: 1 INJECTION INTRAVENOUS at 10:03

## 2020-02-12 RX ADMIN — FUROSEMIDE 20 MG: 10 INJECTION, SOLUTION INTRAMUSCULAR; INTRAVENOUS at 11:04

## 2020-02-12 RX ADMIN — ETOPOSIDE 246 MG: 20 INJECTION, SOLUTION, CONCENTRATE INTRAVENOUS at 12:25

## 2020-02-12 RX ADMIN — SODIUM CHLORIDE 20 ML/HR: 9 INJECTION, SOLUTION INTRAVENOUS at 10:03

## 2020-02-13 ENCOUNTER — HOSPITAL ENCOUNTER (OUTPATIENT)
Dept: INFUSION THERAPY | Age: 30
Setting detail: INFUSION SERIES
Discharge: HOME | End: 2020-02-13
Payer: COMMERCIAL

## 2020-02-13 VITALS
DIASTOLIC BLOOD PRESSURE: 80 MMHG | HEART RATE: 80 BPM | TEMPERATURE: 98.3 F | SYSTOLIC BLOOD PRESSURE: 129 MMHG | RESPIRATION RATE: 17 BRPM | OXYGEN SATURATION: 99 %

## 2020-02-13 DIAGNOSIS — N50.89 MASS OF RIGHT TESTICLE: Primary | ICD-10-CM

## 2020-02-13 DIAGNOSIS — C62.11 CANCER OF DESCENDED RIGHT TESTIS (HCC): ICD-10-CM

## 2020-02-13 PROCEDURE — 2580000003 HC RX 258: Performed by: INTERNAL MEDICINE

## 2020-02-13 PROCEDURE — 96417 CHEMO IV INFUS EACH ADDL SEQ: CPT

## 2020-02-13 PROCEDURE — 6360000002 HC RX W HCPCS: Performed by: INTERNAL MEDICINE

## 2020-02-13 PROCEDURE — 96413 CHEMO IV INFUSION 1 HR: CPT

## 2020-02-13 RX ORDER — PALONOSETRON 0.05 MG/ML
0.25 INJECTION, SOLUTION INTRAVENOUS ONCE
Status: COMPLETED | OUTPATIENT
Start: 2020-02-13 | End: 2020-02-13

## 2020-02-13 RX ORDER — SODIUM CHLORIDE 9 MG/ML
20 INJECTION, SOLUTION INTRAVENOUS ONCE
Status: DISCONTINUED | OUTPATIENT
Start: 2020-02-13 | End: 2020-02-15 | Stop reason: HOSPADM

## 2020-02-13 RX ORDER — DEXAMETHASONE SODIUM PHOSPHATE 10 MG/ML
10 INJECTION, SOLUTION INTRAMUSCULAR; INTRAVENOUS ONCE
Status: COMPLETED | OUTPATIENT
Start: 2020-02-13 | End: 2020-02-13

## 2020-02-13 RX ORDER — FUROSEMIDE 10 MG/ML
20 INJECTION INTRAMUSCULAR; INTRAVENOUS ONCE
Status: COMPLETED | OUTPATIENT
Start: 2020-02-13 | End: 2020-02-13

## 2020-02-13 RX ADMIN — DEXAMETHASONE SODIUM PHOSPHATE 10 MG: 10 INJECTION, SOLUTION INTRAMUSCULAR; INTRAVENOUS at 09:13

## 2020-02-13 RX ADMIN — ETOPOSIDE 246 MG: 20 INJECTION, SOLUTION, CONCENTRATE INTRAVENOUS at 10:59

## 2020-02-13 RX ADMIN — FUROSEMIDE 20 MG: 10 INJECTION, SOLUTION INTRAMUSCULAR; INTRAVENOUS at 10:53

## 2020-02-13 RX ADMIN — CISPLATIN: 1 INJECTION INTRAVENOUS at 09:33

## 2020-02-13 RX ADMIN — PALONOSETRON HYDROCHLORIDE 0.25 MG: 0.25 INJECTION, SOLUTION INTRAVENOUS at 09:13

## 2020-02-14 ENCOUNTER — HOSPITAL ENCOUNTER (OUTPATIENT)
Dept: INFUSION THERAPY | Age: 30
Setting detail: INFUSION SERIES
Discharge: HOME | End: 2020-02-14
Payer: COMMERCIAL

## 2020-02-14 VITALS
OXYGEN SATURATION: 98 % | HEART RATE: 79 BPM | TEMPERATURE: 97.6 F | DIASTOLIC BLOOD PRESSURE: 78 MMHG | SYSTOLIC BLOOD PRESSURE: 133 MMHG | RESPIRATION RATE: 18 BRPM

## 2020-02-14 DIAGNOSIS — C62.11 CANCER OF DESCENDED RIGHT TESTIS (HCC): ICD-10-CM

## 2020-02-14 DIAGNOSIS — N50.89 MASS OF RIGHT TESTICLE: Primary | ICD-10-CM

## 2020-02-14 PROCEDURE — 96417 CHEMO IV INFUS EACH ADDL SEQ: CPT

## 2020-02-14 PROCEDURE — 6360000002 HC RX W HCPCS: Performed by: INTERNAL MEDICINE

## 2020-02-14 PROCEDURE — 96413 CHEMO IV INFUSION 1 HR: CPT

## 2020-02-14 PROCEDURE — 2580000003 HC RX 258: Performed by: INTERNAL MEDICINE

## 2020-02-14 RX ORDER — FUROSEMIDE 10 MG/ML
20 INJECTION INTRAMUSCULAR; INTRAVENOUS ONCE
Status: COMPLETED | OUTPATIENT
Start: 2020-02-14 | End: 2020-02-14

## 2020-02-14 RX ORDER — SODIUM CHLORIDE 9 MG/ML
20 INJECTION, SOLUTION INTRAVENOUS ONCE
Status: DISCONTINUED | OUTPATIENT
Start: 2020-02-14 | End: 2020-02-16 | Stop reason: HOSPADM

## 2020-02-14 RX ORDER — SODIUM CHLORIDE 0.9 % (FLUSH) 0.9 %
10 SYRINGE (ML) INJECTION PRN
Status: DISCONTINUED | OUTPATIENT
Start: 2020-02-14 | End: 2020-02-15 | Stop reason: HOSPADM

## 2020-02-14 RX ORDER — DEXAMETHASONE SODIUM PHOSPHATE 10 MG/ML
10 INJECTION, SOLUTION INTRAMUSCULAR; INTRAVENOUS ONCE
Status: COMPLETED | OUTPATIENT
Start: 2020-02-14 | End: 2020-02-14

## 2020-02-14 RX ADMIN — FUROSEMIDE 20 MG: 10 INJECTION, SOLUTION INTRAMUSCULAR; INTRAVENOUS at 10:12

## 2020-02-14 RX ADMIN — DEXAMETHASONE SODIUM PHOSPHATE 10 MG: 10 INJECTION, SOLUTION INTRAMUSCULAR; INTRAVENOUS at 09:00

## 2020-02-14 RX ADMIN — ETOPOSIDE 246 MG: 20 INJECTION, SOLUTION, CONCENTRATE INTRAVENOUS at 10:42

## 2020-02-14 RX ADMIN — CISPLATIN: 1 INJECTION, SOLUTION INTRAVENOUS at 09:31

## 2020-02-17 ENCOUNTER — HOSPITAL ENCOUNTER (OUTPATIENT)
Dept: INFUSION THERAPY | Age: 30
Discharge: HOME | End: 2020-02-17
Payer: COMMERCIAL

## 2020-02-17 VITALS
OXYGEN SATURATION: 96 % | WEIGHT: 272.3 LBS | HEIGHT: 70 IN | HEART RATE: 83 BPM | DIASTOLIC BLOOD PRESSURE: 92 MMHG | BODY MASS INDEX: 38.98 KG/M2 | SYSTOLIC BLOOD PRESSURE: 136 MMHG

## 2020-02-17 DIAGNOSIS — C62.11 CANCER OF DESCENDED RIGHT TESTIS (HCC): ICD-10-CM

## 2020-02-17 PROCEDURE — 85025 COMPLETE CBC W/AUTO DIFF WBC: CPT

## 2020-02-17 RX ORDER — ONDANSETRON 4 MG/1
4 TABLET, ORALLY DISINTEGRATING ORAL EVERY 8 HOURS PRN
Qty: 60 TABLET | Refills: 0 | Status: SHIPPED | OUTPATIENT
Start: 2020-02-17 | End: 2020-03-23 | Stop reason: SDUPTHER

## 2020-02-17 NOTE — TELEPHONE ENCOUNTER
Patient reports some nausea with treatment that was effectively controlled with prescribed zofran. requests RF.

## 2020-02-24 ENCOUNTER — HOSPITAL ENCOUNTER (OUTPATIENT)
Dept: INFUSION THERAPY | Age: 30
Discharge: HOME | End: 2020-02-24
Payer: COMMERCIAL

## 2020-02-24 DIAGNOSIS — C62.11 CANCER OF DESCENDED RIGHT TESTIS (HCC): ICD-10-CM

## 2020-02-24 PROCEDURE — 85025 COMPLETE CBC W/AUTO DIFF WBC: CPT

## 2020-02-27 ENCOUNTER — TELEPHONE (OUTPATIENT)
Dept: UROLOGY | Age: 30
End: 2020-02-27

## 2020-02-27 ENCOUNTER — OFFICE VISIT (OUTPATIENT)
Dept: UROLOGY | Age: 30
End: 2020-02-27
Payer: COMMERCIAL

## 2020-02-27 VITALS
HEART RATE: 101 BPM | SYSTOLIC BLOOD PRESSURE: 119 MMHG | TEMPERATURE: 97.3 F | HEIGHT: 71 IN | WEIGHT: 267 LBS | BODY MASS INDEX: 37.38 KG/M2 | DIASTOLIC BLOOD PRESSURE: 73 MMHG

## 2020-02-27 PROBLEM — C62.91: Status: ACTIVE | Noted: 2020-02-27

## 2020-02-27 LAB
BILIRUBIN, POC: 0
BLOOD URINE, POC: NORMAL
CLARITY, POC: CLEAR
COLOR, POC: YELLOW
GLUCOSE URINE, POC: NORMAL
KETONES, POC: NORMAL
LEUKOCYTE EST, POC: NORMAL
NITRITE, POC: NORMAL
PH, POC: 5
PROTEIN, POC: NORMAL
SPECIFIC GRAVITY, POC: 1.03
UROBILINOGEN, POC: 0.2

## 2020-02-27 PROCEDURE — 99024 POSTOP FOLLOW-UP VISIT: CPT | Performed by: UROLOGY

## 2020-02-27 PROCEDURE — 81003 URINALYSIS AUTO W/O SCOPE: CPT | Performed by: UROLOGY

## 2020-02-27 RX ORDER — AMOXICILLIN 250 MG/1
250 CAPSULE ORAL 3 TIMES DAILY
COMMUNITY
End: 2020-03-14

## 2020-02-27 ASSESSMENT — ENCOUNTER SYMPTOMS
EYE DISCHARGE: 0
RHINORRHEA: 0
CONSTIPATION: 0
SORE THROAT: 0
SHORTNESS OF BREATH: 0
NAUSEA: 0
DIARRHEA: 0
BACK PAIN: 0
WHEEZING: 0
VOMITING: 0
COLOR CHANGE: 0
BLOOD IN STOOL: 0
COUGH: 0
EYE PAIN: 0
ABDOMINAL PAIN: 0
EYE REDNESS: 0
SINUS PRESSURE: 0
ABDOMINAL DISTENTION: 0
FACIAL SWELLING: 0

## 2020-02-27 NOTE — PROGRESS NOTES
diarrhea, nausea and vomiting. Endocrine: Negative for polydipsia, polyphagia and polyuria. Genitourinary: Negative for decreased urine volume, difficulty urinating, dysuria, enuresis, flank pain, frequency, genital sores, hematuria and urgency. Musculoskeletal: Negative for back pain, gait problem, joint swelling, neck pain and neck stiffness. Skin: Negative for color change, rash and wound. Allergic/Immunologic: Negative for environmental allergies and immunocompromised state. Neurological: Negative for dizziness, syncope, weakness, light-headedness, numbness and headaches. Psychiatric/Behavioral: Negative for agitation, confusion, dysphoric mood, self-injury, sleep disturbance and suicidal ideas. The patient is not hyperactive. PHYSICAL EXAM:  /73   Pulse 101   Temp 97.3 °F (36.3 °C) (Temporal)   Ht 5' 11\" (1.803 m)   Wt 267 lb (121.1 kg)   BMI 37.24 kg/m²   Physical Exam  Abdominal:      Palpations: Abdomen is soft. There is no mass. Tenderness: There is no abdominal tenderness. Comments: Right inguinal incision is well approximated normal expected postoperative changes no signs of infection. Genitourinary:     Penis: Normal.       Scrotum/Testes:         Left: Mass, tenderness or swelling not present.       Comments: Absent right testis normal palpable empty right hemiscrotum            DATA:  CBC with Differential:    Lab Results   Component Value Date    WBC 16.4 02/03/2020    RBC 5.26 02/03/2020    HGB 16.2 02/03/2020    HCT 48.6 02/03/2020     02/03/2020    MCV 92.4 02/03/2020    MCH 30.8 02/03/2020    MCHC 33.3 02/03/2020    RDW 11.4 02/03/2020    LYMPHOPCT 5.5 02/03/2020    MONOPCT 11.5 02/03/2020    BASOPCT 0.4 02/03/2020    MONOSABS 1.90 02/03/2020    LYMPHSABS 0.9 02/03/2020    EOSABS 0.00 02/03/2020    BASOSABS 0.10 02/03/2020     CMP:    Lab Results   Component Value Date     02/10/2020    K 4.2 02/10/2020     02/10/2020    CO2 24 disclaimer: Much of this documentt is electronic  transcription/translation of spoken language to printed text. The  electronic translation of spoken language may be erroneous, or at times,  nonsensical words or phrases may be inadvertently transcribed.  Although I  have reviewed the document for such errors, some may still exist.

## 2020-02-27 NOTE — TELEPHONE ENCOUNTER
Spoke with pt about current balance of $202.29 & past due $132.48. He is currently not working due to cancer. I printed out statement with CS# highlighted. Went over it with him. He is going to call them. He knows he owes, but just can't afford to pay right now.

## 2020-02-28 ENCOUNTER — OFFICE VISIT (OUTPATIENT)
Dept: HEMATOLOGY | Age: 30
End: 2020-02-28
Payer: COMMERCIAL

## 2020-02-28 ENCOUNTER — HOSPITAL ENCOUNTER (OUTPATIENT)
Dept: INFUSION THERAPY | Age: 30
Setting detail: INFUSION SERIES
Discharge: HOME | End: 2020-02-28
Payer: COMMERCIAL

## 2020-02-28 ENCOUNTER — HOSPITAL ENCOUNTER (OUTPATIENT)
Dept: INFUSION THERAPY | Age: 30
Discharge: HOME | End: 2020-02-28
Payer: COMMERCIAL

## 2020-02-28 VITALS
HEIGHT: 71 IN | HEART RATE: 106 BPM | BODY MASS INDEX: 37.38 KG/M2 | WEIGHT: 267 LBS | SYSTOLIC BLOOD PRESSURE: 130 MMHG | DIASTOLIC BLOOD PRESSURE: 88 MMHG | OXYGEN SATURATION: 95 %

## 2020-02-28 DIAGNOSIS — C62.11 CANCER OF DESCENDED RIGHT TESTIS (HCC): ICD-10-CM

## 2020-02-28 DIAGNOSIS — T45.1X5A CHEMOTHERAPY-INDUCED NEUTROPENIA (HCC): Primary | ICD-10-CM

## 2020-02-28 DIAGNOSIS — D70.1 CHEMOTHERAPY-INDUCED NEUTROPENIA (HCC): Primary | ICD-10-CM

## 2020-02-28 PROBLEM — D70.9 NEUTROPENIA (HCC): Status: ACTIVE | Noted: 2020-02-28

## 2020-02-28 PROCEDURE — 85025 COMPLETE CBC W/AUTO DIFF WBC: CPT

## 2020-02-28 PROCEDURE — 96372 THER/PROPH/DIAG INJ SC/IM: CPT

## 2020-02-28 PROCEDURE — 6360000002 HC RX W HCPCS: Performed by: INTERNAL MEDICINE

## 2020-02-28 PROCEDURE — 99214 OFFICE O/P EST MOD 30 MIN: CPT | Performed by: INTERNAL MEDICINE

## 2020-02-28 RX ADMIN — TBO-FILGRASTIM 480 MCG: 480 INJECTION, SOLUTION SUBCUTANEOUS at 14:28

## 2020-02-28 NOTE — PROGRESS NOTES
HEMATOLOGY ONCOLOGY PROGRESS NOTE    Pt Name: Susan Hernandez  MRN: [id number]  YOB: 1990  Date of evaluation: 2/28/2020  He has been seen by a dentist.  HISTORY OF PRESENT ILLNESS:      Diagnosis  · Pure seminoma, February 2020  · [id number], stage IIB  · Good risk    Treatment summary  · 2/4/2020- right orchiectomy  · 2/10/2020-4 cycles etoposide/cisplatin    The patient is a 34years old male who has been diagnosed with pure seminoma stage IIb. He has been started on chemotherapy with cisplatin and etoposide. His last cycle was about 3 weeks ago. He is scheduled for chemotherapy next Monday. He presents today with complaints of swelling of the right jaw. He has recently been seen by his dentist who recommended extraction of his wisdom tooth. He denies any fever or chills. He complains of significant pain. His CBC shows persistent neutropenia with neutrophil 0.13. Cancer history  Mr. Susan Hernandez was first seen by me on 2/5/2020. He presented to the ER department with complaints of back pain on 2/4/2020. A CT of the abdomen revealed a retroperitoneal mass. Further examination also revealed a right testicular mass. Urology was consulted and performed a right orchiectomy on 2/4/2020 consistent with pure seminoma. · 2/3/2020- CT of the abdomen showed 5 cm retroperitoneal lymph node mass within the upper abdomen in the aortocaval location. No liver metastasis. · 2/3/2020-CT of the chest showed no evidence of intrathoracic metastatic disease. · 2/4/2020-right orchie[facility] by Dr[facility] Matthew Seals [facility] consistent with pure seminoma. Tumor measuring 2.5 cm and confined to the testis. Spermatic cord margin negative.   Final pathologic staging [id number], S0 stage IIB  · 2/5/2020-recommended 4 cycles of etoposide 100 mg/m2 days 1-5 and cisplatin 20mg/m2 days 1-5 × 4 cycles q. 21 days  · 2/10/2020 LDH-229, AFP- 2.4, Beta HCG- <1  · 6/13/8531- complicated questions to the patients satisfaction. (Please note that portions of this note were completed with a voice recognition program. Efforts were made to edit the dictations but occasionally words are mis-transcribed.)    I, Dr Wyatt Cabrera, personally performed the services described in this documentation as scribed by Pacheco Ryan MA in my presence and is both accurate and complete. Over 50% of the total visit time of 25 minutes in face to face encounter with the patient, out of which more than 50% of the time was spent in counseling patient or family and coordination of care. Counseling included but was not limited to time spent reviewing labs, imaging studies/ treatment plan and answering questions.    Patricia Ku MD    02/28/20  3:58 PM

## 2020-03-02 ENCOUNTER — HOSPITAL ENCOUNTER (OUTPATIENT)
Dept: INFUSION THERAPY | Age: 30
Setting detail: INFUSION SERIES
Discharge: HOME | End: 2020-03-02
Payer: COMMERCIAL

## 2020-03-02 ENCOUNTER — HOSPITAL ENCOUNTER (OUTPATIENT)
Dept: INFUSION THERAPY | Age: 30
Discharge: HOME | End: 2020-03-02
Payer: COMMERCIAL

## 2020-03-02 ENCOUNTER — APPOINTMENT (OUTPATIENT)
Dept: INFUSION THERAPY | Age: 30
End: 2020-03-02
Payer: COMMERCIAL

## 2020-03-02 VITALS
HEART RATE: 98 BPM | OXYGEN SATURATION: 96 % | SYSTOLIC BLOOD PRESSURE: 142 MMHG | HEIGHT: 71 IN | BODY MASS INDEX: 37.09 KG/M2 | DIASTOLIC BLOOD PRESSURE: 88 MMHG | WEIGHT: 264.9 LBS

## 2020-03-02 VITALS — SYSTOLIC BLOOD PRESSURE: 123 MMHG | DIASTOLIC BLOOD PRESSURE: 72 MMHG

## 2020-03-02 DIAGNOSIS — C62.11 CANCER OF DESCENDED RIGHT TESTIS (HCC): ICD-10-CM

## 2020-03-02 DIAGNOSIS — N50.89 MASS OF RIGHT TESTICLE: Primary | ICD-10-CM

## 2020-03-02 PROCEDURE — 6360000002 HC RX W HCPCS: Performed by: INTERNAL MEDICINE

## 2020-03-02 PROCEDURE — 85025 COMPLETE CBC W/AUTO DIFF WBC: CPT

## 2020-03-02 PROCEDURE — 96417 CHEMO IV INFUS EACH ADDL SEQ: CPT

## 2020-03-02 PROCEDURE — 96413 CHEMO IV INFUSION 1 HR: CPT

## 2020-03-02 PROCEDURE — 2580000003 HC RX 258: Performed by: INTERNAL MEDICINE

## 2020-03-02 RX ORDER — SODIUM CHLORIDE 0.9 % (FLUSH) 0.9 %
5 SYRINGE (ML) INJECTION PRN
Status: CANCELLED | OUTPATIENT
Start: 2020-03-05

## 2020-03-02 RX ORDER — SODIUM CHLORIDE 0.9 % (FLUSH) 0.9 %
5 SYRINGE (ML) INJECTION PRN
Status: CANCELLED | OUTPATIENT
Start: 2020-03-06

## 2020-03-02 RX ORDER — PALONOSETRON 0.05 MG/ML
0.25 INJECTION, SOLUTION INTRAVENOUS ONCE
Status: COMPLETED | OUTPATIENT
Start: 2020-03-02 | End: 2020-03-02

## 2020-03-02 RX ORDER — DIPHENHYDRAMINE HYDROCHLORIDE 50 MG/ML
50 INJECTION INTRAMUSCULAR; INTRAVENOUS ONCE
Status: CANCELLED | OUTPATIENT
Start: 2020-03-05

## 2020-03-02 RX ORDER — PALONOSETRON 0.05 MG/ML
0.25 INJECTION, SOLUTION INTRAVENOUS ONCE
Status: CANCELLED | OUTPATIENT
Start: 2020-03-02

## 2020-03-02 RX ORDER — SODIUM CHLORIDE 9 MG/ML
20 INJECTION, SOLUTION INTRAVENOUS ONCE
Status: COMPLETED | OUTPATIENT
Start: 2020-03-02 | End: 2020-03-02

## 2020-03-02 RX ORDER — FUROSEMIDE 10 MG/ML
20 INJECTION INTRAMUSCULAR; INTRAVENOUS ONCE
Status: CANCELLED
Start: 2020-03-03

## 2020-03-02 RX ORDER — SODIUM CHLORIDE 9 MG/ML
20 INJECTION, SOLUTION INTRAVENOUS ONCE
Status: CANCELLED | OUTPATIENT
Start: 2020-03-02

## 2020-03-02 RX ORDER — METHYLPREDNISOLONE SODIUM SUCCINATE 125 MG/2ML
125 INJECTION, POWDER, LYOPHILIZED, FOR SOLUTION INTRAMUSCULAR; INTRAVENOUS ONCE
Status: CANCELLED | OUTPATIENT
Start: 2020-03-06

## 2020-03-02 RX ORDER — SODIUM CHLORIDE 9 MG/ML
20 INJECTION, SOLUTION INTRAVENOUS ONCE
Status: CANCELLED | OUTPATIENT
Start: 2020-03-03

## 2020-03-02 RX ORDER — HEPARIN SODIUM (PORCINE) LOCK FLUSH IV SOLN 100 UNIT/ML 100 UNIT/ML
500 SOLUTION INTRAVENOUS PRN
Status: CANCELLED | OUTPATIENT
Start: 2020-03-02

## 2020-03-02 RX ORDER — FUROSEMIDE 10 MG/ML
20 INJECTION INTRAMUSCULAR; INTRAVENOUS ONCE
Status: CANCELLED
Start: 2020-03-04

## 2020-03-02 RX ORDER — FUROSEMIDE 10 MG/ML
20 INJECTION INTRAMUSCULAR; INTRAVENOUS ONCE
Status: CANCELLED
Start: 2020-03-02

## 2020-03-02 RX ORDER — SODIUM CHLORIDE 9 MG/ML
INJECTION, SOLUTION INTRAVENOUS CONTINUOUS
Status: CANCELLED | OUTPATIENT
Start: 2020-03-05

## 2020-03-02 RX ORDER — PALONOSETRON 0.05 MG/ML
0.25 INJECTION, SOLUTION INTRAVENOUS ONCE
Status: CANCELLED | OUTPATIENT
Start: 2020-03-05

## 2020-03-02 RX ORDER — SODIUM CHLORIDE 0.9 % (FLUSH) 0.9 %
5 SYRINGE (ML) INJECTION PRN
Status: CANCELLED | OUTPATIENT
Start: 2020-03-03

## 2020-03-02 RX ORDER — SODIUM CHLORIDE 0.9 % (FLUSH) 0.9 %
10 SYRINGE (ML) INJECTION PRN
Status: CANCELLED | OUTPATIENT
Start: 2020-03-02

## 2020-03-02 RX ORDER — SODIUM CHLORIDE 9 MG/ML
INJECTION, SOLUTION INTRAVENOUS CONTINUOUS
Status: CANCELLED | OUTPATIENT
Start: 2020-03-04

## 2020-03-02 RX ORDER — HEPARIN SODIUM (PORCINE) LOCK FLUSH IV SOLN 100 UNIT/ML 100 UNIT/ML
500 SOLUTION INTRAVENOUS PRN
Status: CANCELLED | OUTPATIENT
Start: 2020-03-03

## 2020-03-02 RX ORDER — HEPARIN SODIUM (PORCINE) LOCK FLUSH IV SOLN 100 UNIT/ML 100 UNIT/ML
500 SOLUTION INTRAVENOUS PRN
Status: CANCELLED | OUTPATIENT
Start: 2020-03-04

## 2020-03-02 RX ORDER — SODIUM CHLORIDE 0.9 % (FLUSH) 0.9 %
10 SYRINGE (ML) INJECTION PRN
Status: CANCELLED | OUTPATIENT
Start: 2020-03-03

## 2020-03-02 RX ORDER — SODIUM CHLORIDE 9 MG/ML
INJECTION, SOLUTION INTRAVENOUS CONTINUOUS
Status: CANCELLED | OUTPATIENT
Start: 2020-03-02

## 2020-03-02 RX ORDER — EPINEPHRINE 1 MG/ML
0.3 INJECTION, SOLUTION, CONCENTRATE INTRAVENOUS PRN
Status: CANCELLED | OUTPATIENT
Start: 2020-03-02

## 2020-03-02 RX ORDER — METHYLPREDNISOLONE SODIUM SUCCINATE 125 MG/2ML
125 INJECTION, POWDER, LYOPHILIZED, FOR SOLUTION INTRAMUSCULAR; INTRAVENOUS ONCE
Status: CANCELLED | OUTPATIENT
Start: 2020-03-02

## 2020-03-02 RX ORDER — DEXAMETHASONE SODIUM PHOSPHATE 10 MG/ML
10 INJECTION, SOLUTION INTRAMUSCULAR; INTRAVENOUS ONCE
Status: COMPLETED | OUTPATIENT
Start: 2020-03-02 | End: 2020-03-02

## 2020-03-02 RX ORDER — METHYLPREDNISOLONE SODIUM SUCCINATE 125 MG/2ML
125 INJECTION, POWDER, LYOPHILIZED, FOR SOLUTION INTRAMUSCULAR; INTRAVENOUS ONCE
Status: CANCELLED | OUTPATIENT
Start: 2020-03-04

## 2020-03-02 RX ORDER — SODIUM CHLORIDE 0.9 % (FLUSH) 0.9 %
10 SYRINGE (ML) INJECTION PRN
Status: CANCELLED | OUTPATIENT
Start: 2020-03-06

## 2020-03-02 RX ORDER — SODIUM CHLORIDE 9 MG/ML
INJECTION, SOLUTION INTRAVENOUS CONTINUOUS
Status: CANCELLED | OUTPATIENT
Start: 2020-03-06

## 2020-03-02 RX ORDER — DIPHENHYDRAMINE HYDROCHLORIDE 50 MG/ML
50 INJECTION INTRAMUSCULAR; INTRAVENOUS ONCE
Status: CANCELLED | OUTPATIENT
Start: 2020-03-04

## 2020-03-02 RX ORDER — EPINEPHRINE 1 MG/ML
0.3 INJECTION, SOLUTION, CONCENTRATE INTRAVENOUS PRN
Status: CANCELLED | OUTPATIENT
Start: 2020-03-06

## 2020-03-02 RX ORDER — SODIUM CHLORIDE 0.9 % (FLUSH) 0.9 %
10 SYRINGE (ML) INJECTION PRN
Status: CANCELLED | OUTPATIENT
Start: 2020-03-05

## 2020-03-02 RX ORDER — FUROSEMIDE 10 MG/ML
20 INJECTION INTRAMUSCULAR; INTRAVENOUS ONCE
Status: CANCELLED
Start: 2020-03-05

## 2020-03-02 RX ORDER — DIPHENHYDRAMINE HYDROCHLORIDE 50 MG/ML
50 INJECTION INTRAMUSCULAR; INTRAVENOUS ONCE
Status: CANCELLED | OUTPATIENT
Start: 2020-03-03

## 2020-03-02 RX ORDER — EPINEPHRINE 1 MG/ML
0.3 INJECTION, SOLUTION, CONCENTRATE INTRAVENOUS PRN
Status: CANCELLED | OUTPATIENT
Start: 2020-03-04

## 2020-03-02 RX ORDER — EPINEPHRINE 1 MG/ML
0.3 INJECTION, SOLUTION, CONCENTRATE INTRAVENOUS PRN
Status: CANCELLED | OUTPATIENT
Start: 2020-03-05

## 2020-03-02 RX ORDER — EPINEPHRINE 1 MG/ML
0.3 INJECTION, SOLUTION, CONCENTRATE INTRAVENOUS PRN
Status: CANCELLED | OUTPATIENT
Start: 2020-03-03

## 2020-03-02 RX ORDER — SODIUM CHLORIDE 9 MG/ML
20 INJECTION, SOLUTION INTRAVENOUS ONCE
Status: CANCELLED | OUTPATIENT
Start: 2020-03-06

## 2020-03-02 RX ORDER — SODIUM CHLORIDE 0.9 % (FLUSH) 0.9 %
5 SYRINGE (ML) INJECTION PRN
Status: CANCELLED | OUTPATIENT
Start: 2020-03-02

## 2020-03-02 RX ORDER — METHYLPREDNISOLONE SODIUM SUCCINATE 125 MG/2ML
125 INJECTION, POWDER, LYOPHILIZED, FOR SOLUTION INTRAMUSCULAR; INTRAVENOUS ONCE
Status: CANCELLED | OUTPATIENT
Start: 2020-03-05

## 2020-03-02 RX ORDER — SODIUM CHLORIDE 9 MG/ML
20 INJECTION, SOLUTION INTRAVENOUS ONCE
Status: CANCELLED | OUTPATIENT
Start: 2020-03-05

## 2020-03-02 RX ORDER — SODIUM CHLORIDE 0.9 % (FLUSH) 0.9 %
10 SYRINGE (ML) INJECTION PRN
Status: CANCELLED | OUTPATIENT
Start: 2020-03-04

## 2020-03-02 RX ORDER — DIPHENHYDRAMINE HYDROCHLORIDE 50 MG/ML
50 INJECTION INTRAMUSCULAR; INTRAVENOUS ONCE
Status: CANCELLED | OUTPATIENT
Start: 2020-03-06

## 2020-03-02 RX ORDER — HEPARIN SODIUM (PORCINE) LOCK FLUSH IV SOLN 100 UNIT/ML 100 UNIT/ML
500 SOLUTION INTRAVENOUS PRN
Status: CANCELLED | OUTPATIENT
Start: 2020-03-05

## 2020-03-02 RX ORDER — FUROSEMIDE 10 MG/ML
20 INJECTION INTRAMUSCULAR; INTRAVENOUS ONCE
Status: COMPLETED | OUTPATIENT
Start: 2020-03-02 | End: 2020-03-02

## 2020-03-02 RX ORDER — HEPARIN SODIUM (PORCINE) LOCK FLUSH IV SOLN 100 UNIT/ML 100 UNIT/ML
500 SOLUTION INTRAVENOUS PRN
Status: CANCELLED | OUTPATIENT
Start: 2020-03-06

## 2020-03-02 RX ORDER — SODIUM CHLORIDE 0.9 % (FLUSH) 0.9 %
5 SYRINGE (ML) INJECTION PRN
Status: CANCELLED | OUTPATIENT
Start: 2020-03-04

## 2020-03-02 RX ORDER — SODIUM CHLORIDE 9 MG/ML
20 INJECTION, SOLUTION INTRAVENOUS ONCE
Status: CANCELLED | OUTPATIENT
Start: 2020-03-04

## 2020-03-02 RX ORDER — SODIUM CHLORIDE 9 MG/ML
INJECTION, SOLUTION INTRAVENOUS CONTINUOUS
Status: CANCELLED | OUTPATIENT
Start: 2020-03-03

## 2020-03-02 RX ORDER — METHYLPREDNISOLONE SODIUM SUCCINATE 125 MG/2ML
125 INJECTION, POWDER, LYOPHILIZED, FOR SOLUTION INTRAMUSCULAR; INTRAVENOUS ONCE
Status: CANCELLED | OUTPATIENT
Start: 2020-03-03

## 2020-03-02 RX ORDER — DIPHENHYDRAMINE HYDROCHLORIDE 50 MG/ML
50 INJECTION INTRAMUSCULAR; INTRAVENOUS ONCE
Status: CANCELLED | OUTPATIENT
Start: 2020-03-02

## 2020-03-02 RX ORDER — FUROSEMIDE 10 MG/ML
20 INJECTION INTRAMUSCULAR; INTRAVENOUS ONCE
Status: CANCELLED
Start: 2020-03-06

## 2020-03-02 RX ADMIN — SODIUM CHLORIDE 150 MG: 900 INJECTION, SOLUTION INTRAVENOUS at 11:02

## 2020-03-02 RX ADMIN — PALONOSETRON HYDROCHLORIDE 0.25 MG: 0.25 INJECTION, SOLUTION INTRAVENOUS at 11:37

## 2020-03-02 RX ADMIN — DEXAMETHASONE SODIUM PHOSPHATE 10 MG: 10 INJECTION, SOLUTION INTRAMUSCULAR; INTRAVENOUS at 11:37

## 2020-03-02 RX ADMIN — SODIUM CHLORIDE 20 ML/HR: 9 INJECTION, SOLUTION INTRAVENOUS at 12:30

## 2020-03-02 RX ADMIN — ETOPOSIDE 246 MG: 20 INJECTION, SOLUTION, CONCENTRATE INTRAVENOUS at 13:36

## 2020-03-02 RX ADMIN — CISPLATIN: 1 INJECTION INTRAVENOUS at 12:30

## 2020-03-02 RX ADMIN — FUROSEMIDE 20 MG: 10 INJECTION, SOLUTION INTRAMUSCULAR; INTRAVENOUS at 15:04

## 2020-03-02 NOTE — PROGRESS NOTES
Patient presents for cbc/evaluation. He had one granix injection with positive response. Will not require anymore granix. Proceed with C2 as planned at [address] beginning today. Patient is without complaint. Reports that he did not have any significant side effects from tx. Verbalizes understanding of s/e to report.

## 2020-03-03 ENCOUNTER — HOSPITAL ENCOUNTER (OUTPATIENT)
Dept: INFUSION THERAPY | Age: 30
Setting detail: INFUSION SERIES
Discharge: HOME | End: 2020-03-03
Payer: COMMERCIAL

## 2020-03-03 ENCOUNTER — APPOINTMENT (OUTPATIENT)
Dept: INFUSION THERAPY | Age: 30
End: 2020-03-03
Payer: COMMERCIAL

## 2020-03-03 VITALS
DIASTOLIC BLOOD PRESSURE: 65 MMHG | RESPIRATION RATE: 18 BRPM | OXYGEN SATURATION: 97 % | SYSTOLIC BLOOD PRESSURE: 113 MMHG | TEMPERATURE: 98 F | HEART RATE: 101 BPM | BODY MASS INDEX: 36.82 KG/M2 | WEIGHT: 264 LBS

## 2020-03-03 DIAGNOSIS — C62.11 CANCER OF DESCENDED RIGHT TESTIS (HCC): ICD-10-CM

## 2020-03-03 DIAGNOSIS — N50.89 MASS OF RIGHT TESTICLE: Primary | ICD-10-CM

## 2020-03-03 PROCEDURE — 2580000003 HC RX 258: Performed by: INTERNAL MEDICINE

## 2020-03-03 PROCEDURE — 96413 CHEMO IV INFUSION 1 HR: CPT

## 2020-03-03 PROCEDURE — 96415 CHEMO IV INFUSION ADDL HR: CPT

## 2020-03-03 PROCEDURE — 6360000002 HC RX W HCPCS: Performed by: INTERNAL MEDICINE

## 2020-03-03 RX ORDER — DEXAMETHASONE SODIUM PHOSPHATE 10 MG/ML
10 INJECTION, SOLUTION INTRAMUSCULAR; INTRAVENOUS ONCE
Status: COMPLETED | OUTPATIENT
Start: 2020-03-03 | End: 2020-03-03

## 2020-03-03 RX ORDER — FUROSEMIDE 10 MG/ML
20 INJECTION INTRAMUSCULAR; INTRAVENOUS ONCE
Status: COMPLETED | OUTPATIENT
Start: 2020-03-03 | End: 2020-03-03

## 2020-03-03 RX ORDER — SODIUM CHLORIDE 9 MG/ML
20 INJECTION, SOLUTION INTRAVENOUS ONCE
Status: DISCONTINUED | OUTPATIENT
Start: 2020-03-03 | End: 2020-03-05 | Stop reason: HOSPADM

## 2020-03-03 RX ADMIN — FUROSEMIDE 20 MG: 10 INJECTION, SOLUTION INTRAMUSCULAR; INTRAVENOUS at 09:45

## 2020-03-03 RX ADMIN — DEXAMETHASONE SODIUM PHOSPHATE 10 MG: 10 INJECTION, SOLUTION INTRAMUSCULAR; INTRAVENOUS at 09:18

## 2020-03-03 RX ADMIN — CISPLATIN: 1 INJECTION, SOLUTION INTRAVENOUS at 09:39

## 2020-03-03 RX ADMIN — ETOPOSIDE 246 MG: 20 INJECTION, SOLUTION, CONCENTRATE INTRAVENOUS at 10:48

## 2020-03-04 ENCOUNTER — APPOINTMENT (OUTPATIENT)
Dept: INFUSION THERAPY | Age: 30
End: 2020-03-04
Payer: COMMERCIAL

## 2020-03-04 ENCOUNTER — HOSPITAL ENCOUNTER (OUTPATIENT)
Dept: INFUSION THERAPY | Age: 30
Setting detail: INFUSION SERIES
Discharge: HOME | End: 2020-03-04
Payer: COMMERCIAL

## 2020-03-04 VITALS
RESPIRATION RATE: 18 BRPM | DIASTOLIC BLOOD PRESSURE: 72 MMHG | BODY MASS INDEX: 36.82 KG/M2 | SYSTOLIC BLOOD PRESSURE: 132 MMHG | WEIGHT: 264 LBS | TEMPERATURE: 97.8 F | OXYGEN SATURATION: 97 % | HEART RATE: 94 BPM

## 2020-03-04 DIAGNOSIS — C62.11 CANCER OF DESCENDED RIGHT TESTIS (HCC): ICD-10-CM

## 2020-03-04 DIAGNOSIS — N50.89 MASS OF RIGHT TESTICLE: Primary | ICD-10-CM

## 2020-03-04 PROCEDURE — 6360000002 HC RX W HCPCS: Performed by: INTERNAL MEDICINE

## 2020-03-04 PROCEDURE — 96413 CHEMO IV INFUSION 1 HR: CPT

## 2020-03-04 PROCEDURE — 96417 CHEMO IV INFUS EACH ADDL SEQ: CPT

## 2020-03-04 PROCEDURE — 2500000003 HC RX 250 WO HCPCS: Performed by: INTERNAL MEDICINE

## 2020-03-04 PROCEDURE — 2580000003 HC RX 258: Performed by: INTERNAL MEDICINE

## 2020-03-04 PROCEDURE — 96361 HYDRATE IV INFUSION ADD-ON: CPT

## 2020-03-04 PROCEDURE — 96360 HYDRATION IV INFUSION INIT: CPT

## 2020-03-04 RX ORDER — DEXAMETHASONE SODIUM PHOSPHATE 10 MG/ML
10 INJECTION, SOLUTION INTRAMUSCULAR; INTRAVENOUS ONCE
Status: COMPLETED | OUTPATIENT
Start: 2020-03-04 | End: 2020-03-04

## 2020-03-04 RX ORDER — FUROSEMIDE 10 MG/ML
20 INJECTION INTRAMUSCULAR; INTRAVENOUS ONCE
Status: COMPLETED | OUTPATIENT
Start: 2020-03-04 | End: 2020-03-04

## 2020-03-04 RX ORDER — SODIUM CHLORIDE 9 MG/ML
20 INJECTION, SOLUTION INTRAVENOUS ONCE
Status: COMPLETED | OUTPATIENT
Start: 2020-03-04 | End: 2020-03-04

## 2020-03-04 RX ADMIN — DEXAMETHASONE SODIUM PHOSPHATE 10 MG: 10 INJECTION, SOLUTION INTRAMUSCULAR; INTRAVENOUS at 10:07

## 2020-03-04 RX ADMIN — SODIUM CHLORIDE 20 ML/HR: 9 INJECTION, SOLUTION INTRAVENOUS at 11:59

## 2020-03-04 RX ADMIN — FUROSEMIDE 20 MG: 10 INJECTION, SOLUTION INTRAMUSCULAR; INTRAVENOUS at 10:13

## 2020-03-04 RX ADMIN — FAMOTIDINE 20 MG: 10 INJECTION INTRAVENOUS at 10:00

## 2020-03-04 RX ADMIN — CISPLATIN: 1 INJECTION, SOLUTION INTRAVENOUS at 10:17

## 2020-03-04 RX ADMIN — ETOPOSIDE 246 MG: 20 INJECTION, SOLUTION, CONCENTRATE INTRAVENOUS at 11:27

## 2020-03-05 ENCOUNTER — APPOINTMENT (OUTPATIENT)
Dept: INFUSION THERAPY | Age: 30
End: 2020-03-05
Payer: COMMERCIAL

## 2020-03-05 ENCOUNTER — HOSPITAL ENCOUNTER (OUTPATIENT)
Dept: INFUSION THERAPY | Age: 30
Setting detail: INFUSION SERIES
Discharge: HOME | End: 2020-03-05
Payer: COMMERCIAL

## 2020-03-05 VITALS
HEART RATE: 90 BPM | OXYGEN SATURATION: 100 % | DIASTOLIC BLOOD PRESSURE: 84 MMHG | TEMPERATURE: 97.7 F | RESPIRATION RATE: 18 BRPM | SYSTOLIC BLOOD PRESSURE: 140 MMHG

## 2020-03-05 DIAGNOSIS — N50.89 MASS OF RIGHT TESTICLE: Primary | ICD-10-CM

## 2020-03-05 DIAGNOSIS — C62.11 CANCER OF DESCENDED RIGHT TESTIS (HCC): ICD-10-CM

## 2020-03-05 PROCEDURE — 2580000003 HC RX 258: Performed by: INTERNAL MEDICINE

## 2020-03-05 PROCEDURE — 96417 CHEMO IV INFUS EACH ADDL SEQ: CPT

## 2020-03-05 PROCEDURE — 96413 CHEMO IV INFUSION 1 HR: CPT

## 2020-03-05 PROCEDURE — 6360000002 HC RX W HCPCS: Performed by: INTERNAL MEDICINE

## 2020-03-05 RX ORDER — SODIUM CHLORIDE 9 MG/ML
20 INJECTION, SOLUTION INTRAVENOUS ONCE
Status: DISCONTINUED | OUTPATIENT
Start: 2020-03-05 | End: 2020-03-07 | Stop reason: HOSPADM

## 2020-03-05 RX ORDER — FUROSEMIDE 10 MG/ML
20 INJECTION INTRAMUSCULAR; INTRAVENOUS ONCE
Status: COMPLETED | OUTPATIENT
Start: 2020-03-05 | End: 2020-03-05

## 2020-03-05 RX ORDER — PALONOSETRON 0.05 MG/ML
0.25 INJECTION, SOLUTION INTRAVENOUS ONCE
Status: COMPLETED | OUTPATIENT
Start: 2020-03-05 | End: 2020-03-05

## 2020-03-05 RX ORDER — DEXAMETHASONE SODIUM PHOSPHATE 10 MG/ML
10 INJECTION, SOLUTION INTRAMUSCULAR; INTRAVENOUS ONCE
Status: COMPLETED | OUTPATIENT
Start: 2020-03-05 | End: 2020-03-05

## 2020-03-05 RX ADMIN — CISPLATIN: 1 INJECTION INTRAVENOUS at 09:34

## 2020-03-05 RX ADMIN — FUROSEMIDE 20 MG: 10 INJECTION, SOLUTION INTRAMUSCULAR; INTRAVENOUS at 10:30

## 2020-03-05 RX ADMIN — DEXAMETHASONE SODIUM PHOSPHATE 10 MG: 10 INJECTION, SOLUTION INTRAMUSCULAR; INTRAVENOUS at 09:07

## 2020-03-05 RX ADMIN — ETOPOSIDE 246 MG: 20 INJECTION, SOLUTION, CONCENTRATE INTRAVENOUS at 10:54

## 2020-03-05 RX ADMIN — PALONOSETRON HYDROCHLORIDE 0.25 MG: 0.25 INJECTION, SOLUTION INTRAVENOUS at 09:07

## 2020-03-06 ENCOUNTER — APPOINTMENT (OUTPATIENT)
Dept: INFUSION THERAPY | Age: 30
End: 2020-03-06
Payer: COMMERCIAL

## 2020-03-06 ENCOUNTER — HOSPITAL ENCOUNTER (OUTPATIENT)
Dept: INFUSION THERAPY | Age: 30
Setting detail: INFUSION SERIES
Discharge: HOME | End: 2020-03-06
Payer: COMMERCIAL

## 2020-03-06 VITALS
HEART RATE: 85 BPM | SYSTOLIC BLOOD PRESSURE: 126 MMHG | DIASTOLIC BLOOD PRESSURE: 84 MMHG | TEMPERATURE: 97.8 F | OXYGEN SATURATION: 100 % | RESPIRATION RATE: 18 BRPM

## 2020-03-06 DIAGNOSIS — C62.11 CANCER OF DESCENDED RIGHT TESTIS (HCC): ICD-10-CM

## 2020-03-06 DIAGNOSIS — N50.89 MASS OF RIGHT TESTICLE: Primary | ICD-10-CM

## 2020-03-06 PROCEDURE — 2580000003 HC RX 258: Performed by: INTERNAL MEDICINE

## 2020-03-06 PROCEDURE — 96413 CHEMO IV INFUSION 1 HR: CPT

## 2020-03-06 PROCEDURE — 6360000002 HC RX W HCPCS: Performed by: INTERNAL MEDICINE

## 2020-03-06 PROCEDURE — 96417 CHEMO IV INFUS EACH ADDL SEQ: CPT

## 2020-03-06 PROCEDURE — 96377 APPLICATON ON-BODY INJECTOR: CPT

## 2020-03-06 PROCEDURE — 96360 HYDRATION IV INFUSION INIT: CPT

## 2020-03-06 PROCEDURE — 2500000003 HC RX 250 WO HCPCS: Performed by: INTERNAL MEDICINE

## 2020-03-06 PROCEDURE — 96361 HYDRATE IV INFUSION ADD-ON: CPT

## 2020-03-06 RX ORDER — SODIUM CHLORIDE 9 MG/ML
20 INJECTION, SOLUTION INTRAVENOUS ONCE
Status: DISCONTINUED | OUTPATIENT
Start: 2020-03-06 | End: 2020-03-08 | Stop reason: HOSPADM

## 2020-03-06 RX ORDER — FUROSEMIDE 10 MG/ML
20 INJECTION INTRAMUSCULAR; INTRAVENOUS ONCE
Status: COMPLETED | OUTPATIENT
Start: 2020-03-06 | End: 2020-03-06

## 2020-03-06 RX ORDER — DEXAMETHASONE SODIUM PHOSPHATE 10 MG/ML
10 INJECTION, SOLUTION INTRAMUSCULAR; INTRAVENOUS ONCE
Status: COMPLETED | OUTPATIENT
Start: 2020-03-06 | End: 2020-03-06

## 2020-03-06 RX ADMIN — CISPLATIN: 1 INJECTION INTRAVENOUS at 09:34

## 2020-03-06 RX ADMIN — FAMOTIDINE 20 MG: 10 INJECTION, SOLUTION INTRAVENOUS at 12:02

## 2020-03-06 RX ADMIN — PEGFILGRASTIM 6 MG: KIT SUBCUTANEOUS at 12:02

## 2020-03-06 RX ADMIN — ETOPOSIDE 246 MG: 20 INJECTION, SOLUTION, CONCENTRATE INTRAVENOUS at 10:36

## 2020-03-06 RX ADMIN — DEXAMETHASONE SODIUM PHOSPHATE 10 MG: 10 INJECTION, SOLUTION INTRAMUSCULAR; INTRAVENOUS at 09:11

## 2020-03-06 RX ADMIN — FUROSEMIDE 20 MG: 10 INJECTION, SOLUTION INTRAMUSCULAR; INTRAVENOUS at 10:02

## 2020-03-06 NOTE — PROGRESS NOTES
HEMATOLOGY ONCOLOGY PROGRESS NOTE    Pt Name: Iman Jolly  MRN: [id number]  YOB: 1990  Date of evaluation: 3/9/2020    HISTORY OF PRESENT ILLNESS:      Diagnosis  · Pure seminoma, February 2020  · [id number], stage IIB  · Good risk    Treatment summary  · 2/4/2020- right orchiectomy  · 2/10/2020-4 cycles etoposide/cisplatin    The patient is a 34years old male who has been diagnosed with pure seminoma stage IIb. He has received 2 cycles of etoposide and cisplatin. He presents today for further follow-up. He had a dental infection last week and received antibiotics. He reports complete resolution of his dental pain. He denies any fever. His CBC today showed leukocytosis due to G-CSF that was given during cycle #2. He is doing well otherwise. He has completed his course of antibiotics. Cancer history  Mr. Iman Jolly was first seen by me on 2/5/2020. He presented to the ER department with complaints of back pain on 2/4/2020. A CT of the abdomen revealed a retroperitoneal mass. Further examination also revealed a right testicular mass. Urology was consulted and performed a right orchiectomy on 2/4/2020 consistent with pure seminoma. · 2/3/2020- CT of the abdomen showed 5 cm retroperitoneal lymph node mass within the upper abdomen in the aortocaval location. No liver metastasis. · 2/3/2020-CT of the chest showed no evidence of intrathoracic metastatic disease. · 2/4/2020-right orchiectomy by Dr. Thi Berkowitz at [facility] consistent with pure seminoma. Tumor measuring 2.5 cm and confined to the testis. Spermatic cord margin negative. Final pathologic staging [id number], S0 stage IIB  · 2/5/2020-recommended 4 cycles of etoposide 100 mg/m2 days 1-5 and cisplatin 20mg/m2 days 1-5 × 4 cycles q. 21 days  · 2/10/2020 LDH-229, AFP- 2.4, Beta HCG- <1  · 8/38/9468- complicated neutropenia with oral infection. Recommend to continue with amoxicillin. Will add Neupogen. We will also add Neulasta to cycle #2. Past Medical History:    Past Medical History:   Diagnosis Date    Back pain     Cancer ([address]     testicular    Hypertension     Lumbar stress fracture    History of asthma  Tobacco abuse x12 years    Past Surgical History:      · Right orchiectomy 2/4/2020    Social History:    Social History     Socioeconomic History    Marital status:      Spouse name: Not on file    Number of children: Not on file    Years of education: Not on file    Highest education level: Not on file   Occupational History    Not on file   Social Needs    Financial resource strain: Not on file    Food insecurity     Worry: Not on file     Inability: Not on file    Transportation needs     Medical: Not on file     Non-medical: Not on file   Tobacco Use    Smoking status: Former Smoker     Packs/day: 0.50    Smokeless tobacco: Never Used   Substance and Sexual Activity    Alcohol use: No    Drug use: No    Sexual activity: Yes     Partners: Female   Lifestyle    Physical activity     Days per week: Not on file     Minutes per session: Not on file    Stress: Not on file   Relationships    Social connections     Talks on phone: Not on file     Gets together: Not on file     Attends Rest[employer] service: Not on file     Active member of club or organization: Not on file     Attends meetings of clubs or organizations: Not on file     Relationship status: Not on file    Intimate partner violence     Fear of current or ex partner: Not on file     Emotionally abused: Not on file     Physically abused: Not on file     Forced sexual activity: Not on file   Other Topics Concern    Not on file   Social History Narrative    Not on file   Smoker x12 years    Family History:     No family history of cancer    Current Hospital Medications:    No current facility-administered medications for this visit. Allergies:    Allergies   Allergen Reactions    Nuts

## 2020-03-09 ENCOUNTER — OFFICE VISIT (OUTPATIENT)
Dept: HEMATOLOGY | Age: 30
End: 2020-03-09
Payer: COMMERCIAL

## 2020-03-09 ENCOUNTER — HOSPITAL ENCOUNTER (OUTPATIENT)
Dept: INFUSION THERAPY | Age: 30
Discharge: HOME | End: 2020-03-09
Payer: COMMERCIAL

## 2020-03-09 VITALS
SYSTOLIC BLOOD PRESSURE: 124 MMHG | DIASTOLIC BLOOD PRESSURE: 90 MMHG | BODY MASS INDEX: 37.14 KG/M2 | HEIGHT: 71 IN | OXYGEN SATURATION: 95 % | WEIGHT: 265.3 LBS | HEART RATE: 115 BPM

## 2020-03-09 DIAGNOSIS — C62.11 CANCER OF DESCENDED RIGHT TESTIS (HCC): ICD-10-CM

## 2020-03-09 PROCEDURE — 99211 OFF/OP EST MAY X REQ PHY/QHP: CPT

## 2020-03-09 PROCEDURE — 85025 COMPLETE CBC W/AUTO DIFF WBC: CPT

## 2020-03-09 PROCEDURE — 99214 OFFICE O/P EST MOD 30 MIN: CPT | Performed by: INTERNAL MEDICINE

## 2020-03-14 ENCOUNTER — HOSPITAL ENCOUNTER (OUTPATIENT)
Age: 30
Setting detail: OBSERVATION
Discharge: LEFT BEFORE DISCHARGE | End: 2020-03-15
Attending: EMERGENCY MEDICINE | Admitting: INTERNAL MEDICINE
Payer: COMMERCIAL

## 2020-03-14 ENCOUNTER — APPOINTMENT (OUTPATIENT)
Dept: CT IMAGING | Age: 30
End: 2020-03-14
Payer: COMMERCIAL

## 2020-03-14 ENCOUNTER — APPOINTMENT (OUTPATIENT)
Dept: GENERAL RADIOLOGY | Age: 30
End: 2020-03-14
Payer: COMMERCIAL

## 2020-03-14 PROBLEM — J45.909 ASTHMA: Status: ACTIVE | Noted: 2020-03-14

## 2020-03-14 PROBLEM — D61.810 PANCYTOPENIA DUE TO CHEMOTHERAPY (HCC): Status: ACTIVE | Noted: 2020-03-14

## 2020-03-14 PROBLEM — R52 INTRACTABLE PAIN: Status: ACTIVE | Noted: 2020-03-14

## 2020-03-14 LAB
ALBUMIN SERPL-MCNC: 4.1 G/DL (ref 3.5–5.2)
ALP BLD-CCNC: 114 U/L (ref 40–130)
ALT SERPL-CCNC: 45 U/L (ref 5–41)
ANION GAP SERPL CALCULATED.3IONS-SCNC: 14 MMOL/L (ref 7–19)
AST SERPL-CCNC: 23 U/L (ref 5–40)
BILIRUB SERPL-MCNC: 0.5 MG/DL (ref 0.2–1.2)
BILIRUBIN URINE: NEGATIVE
BLOOD, URINE: NEGATIVE
BUN BLDV-MCNC: 9 MG/DL (ref 6–20)
CALCIUM SERPL-MCNC: 9.1 MG/DL (ref 8.6–10)
CHLORIDE BLD-SCNC: 102 MMOL/L (ref 98–111)
CLARITY: CLEAR
CO2: 22 MMOL/L (ref 22–29)
COLOR: YELLOW
CREAT SERPL-MCNC: 0.7 MG/DL (ref 0.5–1.2)
GFR NON-AFRICAN AMERICAN: >60
GLUCOSE BLD-MCNC: 115 MG/DL (ref 74–109)
GLUCOSE URINE: NEGATIVE MG/DL
KETONES, URINE: NEGATIVE MG/DL
LACTIC ACID: 1.5 MMOL/L (ref 0.5–1.9)
LACTIC ACID: 2.1 MMOL/L (ref 0.5–1.9)
LEUKOCYTE ESTERASE, URINE: NEGATIVE
NITRITE, URINE: NEGATIVE
PH UA: 5.5 (ref 5–8)
POTASSIUM SERPL-SCNC: 3.7 MMOL/L (ref 3.5–5)
PROTEIN UA: NEGATIVE MG/DL
RAPID INFLUENZA  B AGN: NEGATIVE
RAPID INFLUENZA A AGN: NEGATIVE
SODIUM BLD-SCNC: 138 MMOL/L (ref 136–145)
SPECIFIC GRAVITY UA: 1.02 (ref 1–1.03)
TOTAL CK: 57 U/L (ref 39–308)
TOTAL PROTEIN: 6.5 G/DL (ref 6.6–8.7)
TROPONIN: <0.01 NG/ML (ref 0–0.03)
URINE REFLEX TO CULTURE: NORMAL
UROBILINOGEN, URINE: 0.2 E.U./DL

## 2020-03-14 PROCEDURE — G0378 HOSPITAL OBSERVATION PER HR: HCPCS

## 2020-03-14 PROCEDURE — 81003 URINALYSIS AUTO W/O SCOPE: CPT

## 2020-03-14 PROCEDURE — 6360000004 HC RX CONTRAST MEDICATION: Performed by: EMERGENCY MEDICINE

## 2020-03-14 PROCEDURE — 2580000003 HC RX 258: Performed by: INTERNAL MEDICINE

## 2020-03-14 PROCEDURE — 85025 COMPLETE CBC W/AUTO DIFF WBC: CPT

## 2020-03-14 PROCEDURE — 96375 TX/PRO/DX INJ NEW DRUG ADDON: CPT

## 2020-03-14 PROCEDURE — 96361 HYDRATE IV INFUSION ADD-ON: CPT

## 2020-03-14 PROCEDURE — 87040 BLOOD CULTURE FOR BACTERIA: CPT

## 2020-03-14 PROCEDURE — 82550 ASSAY OF CK (CPK): CPT

## 2020-03-14 PROCEDURE — 93005 ELECTROCARDIOGRAM TRACING: CPT | Performed by: INTERNAL MEDICINE

## 2020-03-14 PROCEDURE — 6370000000 HC RX 637 (ALT 250 FOR IP): Performed by: INTERNAL MEDICINE

## 2020-03-14 PROCEDURE — 80053 COMPREHEN METABOLIC PANEL: CPT

## 2020-03-14 PROCEDURE — 99254 IP/OBS CNSLTJ NEW/EST MOD 60: CPT | Performed by: INTERNAL MEDICINE

## 2020-03-14 PROCEDURE — 2580000003 HC RX 258: Performed by: EMERGENCY MEDICINE

## 2020-03-14 PROCEDURE — 96376 TX/PRO/DX INJ SAME DRUG ADON: CPT

## 2020-03-14 PROCEDURE — 6360000002 HC RX W HCPCS: Performed by: INTERNAL MEDICINE

## 2020-03-14 PROCEDURE — 74177 CT ABD & PELVIS W/CONTRAST: CPT

## 2020-03-14 PROCEDURE — 6360000002 HC RX W HCPCS: Performed by: EMERGENCY MEDICINE

## 2020-03-14 PROCEDURE — 36415 COLL VENOUS BLD VENIPUNCTURE: CPT

## 2020-03-14 PROCEDURE — 87086 URINE CULTURE/COLONY COUNT: CPT

## 2020-03-14 PROCEDURE — 83605 ASSAY OF LACTIC ACID: CPT

## 2020-03-14 PROCEDURE — 99285 EMERGENCY DEPT VISIT HI MDM: CPT

## 2020-03-14 PROCEDURE — 96374 THER/PROPH/DIAG INJ IV PUSH: CPT

## 2020-03-14 PROCEDURE — 84484 ASSAY OF TROPONIN QUANT: CPT

## 2020-03-14 PROCEDURE — 87804 INFLUENZA ASSAY W/OPTIC: CPT

## 2020-03-14 PROCEDURE — 71046 X-RAY EXAM CHEST 2 VIEWS: CPT

## 2020-03-14 RX ORDER — MAGNESIUM SULFATE IN WATER 40 MG/ML
2 INJECTION, SOLUTION INTRAVENOUS PRN
Status: DISCONTINUED | OUTPATIENT
Start: 2020-03-14 | End: 2020-03-15 | Stop reason: HOSPADM

## 2020-03-14 RX ORDER — ONDANSETRON 2 MG/ML
4 INJECTION INTRAMUSCULAR; INTRAVENOUS ONCE
Status: COMPLETED | OUTPATIENT
Start: 2020-03-14 | End: 2020-03-14

## 2020-03-14 RX ORDER — PROMETHAZINE HYDROCHLORIDE 12.5 MG/1
12.5 TABLET ORAL EVERY 6 HOURS PRN
Status: DISCONTINUED | OUTPATIENT
Start: 2020-03-14 | End: 2020-03-15 | Stop reason: HOSPADM

## 2020-03-14 RX ORDER — OXYCODONE HCL 10 MG/1
10 TABLET, FILM COATED, EXTENDED RELEASE ORAL EVERY 12 HOURS SCHEDULED
Status: DISCONTINUED | OUTPATIENT
Start: 2020-03-14 | End: 2020-03-15 | Stop reason: HOSPADM

## 2020-03-14 RX ORDER — SODIUM CHLORIDE 0.9 % (FLUSH) 0.9 %
10 SYRINGE (ML) INJECTION PRN
Status: DISCONTINUED | OUTPATIENT
Start: 2020-03-14 | End: 2020-03-15 | Stop reason: HOSPADM

## 2020-03-14 RX ORDER — POTASSIUM CHLORIDE 7.45 MG/ML
10 INJECTION INTRAVENOUS PRN
Status: DISCONTINUED | OUTPATIENT
Start: 2020-03-14 | End: 2020-03-15 | Stop reason: HOSPADM

## 2020-03-14 RX ORDER — LISINOPRIL 10 MG/1
10 TABLET ORAL DAILY
Status: DISCONTINUED | OUTPATIENT
Start: 2020-03-14 | End: 2020-03-15 | Stop reason: HOSPADM

## 2020-03-14 RX ORDER — MONTELUKAST SODIUM 10 MG/1
10 TABLET ORAL NIGHTLY
Status: DISCONTINUED | OUTPATIENT
Start: 2020-03-14 | End: 2020-03-15 | Stop reason: HOSPADM

## 2020-03-14 RX ORDER — SODIUM CHLORIDE 9 MG/ML
INJECTION, SOLUTION INTRAVENOUS CONTINUOUS
Status: DISCONTINUED | OUTPATIENT
Start: 2020-03-14 | End: 2020-03-15 | Stop reason: HOSPADM

## 2020-03-14 RX ORDER — ACETAMINOPHEN 650 MG/1
650 SUPPOSITORY RECTAL EVERY 6 HOURS PRN
Status: DISCONTINUED | OUTPATIENT
Start: 2020-03-14 | End: 2020-03-15 | Stop reason: HOSPADM

## 2020-03-14 RX ORDER — POTASSIUM CHLORIDE 20 MEQ/1
40 TABLET, EXTENDED RELEASE ORAL PRN
Status: DISCONTINUED | OUTPATIENT
Start: 2020-03-14 | End: 2020-03-15 | Stop reason: HOSPADM

## 2020-03-14 RX ORDER — POLYETHYLENE GLYCOL 3350 17 G/17G
17 POWDER, FOR SOLUTION ORAL DAILY PRN
Status: DISCONTINUED | OUTPATIENT
Start: 2020-03-14 | End: 2020-03-15 | Stop reason: HOSPADM

## 2020-03-14 RX ORDER — IPRATROPIUM BROMIDE AND ALBUTEROL SULFATE 2.5; .5 MG/3ML; MG/3ML
1 SOLUTION RESPIRATORY (INHALATION) EVERY 4 HOURS PRN
Status: DISCONTINUED | OUTPATIENT
Start: 2020-03-14 | End: 2020-03-15 | Stop reason: HOSPADM

## 2020-03-14 RX ORDER — ONDANSETRON 2 MG/ML
4 INJECTION INTRAMUSCULAR; INTRAVENOUS EVERY 6 HOURS PRN
Status: DISCONTINUED | OUTPATIENT
Start: 2020-03-14 | End: 2020-03-15 | Stop reason: HOSPADM

## 2020-03-14 RX ORDER — MORPHINE SULFATE 4 MG/ML
2 INJECTION, SOLUTION INTRAMUSCULAR; INTRAVENOUS EVERY 4 HOURS PRN
Status: DISCONTINUED | OUTPATIENT
Start: 2020-03-14 | End: 2020-03-15 | Stop reason: HOSPADM

## 2020-03-14 RX ORDER — MORPHINE SULFATE 4 MG/ML
4 INJECTION, SOLUTION INTRAMUSCULAR; INTRAVENOUS ONCE
Status: COMPLETED | OUTPATIENT
Start: 2020-03-14 | End: 2020-03-14

## 2020-03-14 RX ORDER — SODIUM CHLORIDE, SODIUM LACTATE, POTASSIUM CHLORIDE, AND CALCIUM CHLORIDE .6; .31; .03; .02 G/100ML; G/100ML; G/100ML; G/100ML
1000 INJECTION, SOLUTION INTRAVENOUS ONCE
Status: COMPLETED | OUTPATIENT
Start: 2020-03-14 | End: 2020-03-14

## 2020-03-14 RX ORDER — SODIUM CHLORIDE 0.9 % (FLUSH) 0.9 %
10 SYRINGE (ML) INJECTION EVERY 12 HOURS SCHEDULED
Status: DISCONTINUED | OUTPATIENT
Start: 2020-03-14 | End: 2020-03-15 | Stop reason: HOSPADM

## 2020-03-14 RX ORDER — ACETAMINOPHEN 325 MG/1
650 TABLET ORAL EVERY 6 HOURS PRN
Status: DISCONTINUED | OUTPATIENT
Start: 2020-03-14 | End: 2020-03-15 | Stop reason: HOSPADM

## 2020-03-14 RX ADMIN — MORPHINE SULFATE 4 MG: 4 INJECTION, SOLUTION INTRAMUSCULAR; INTRAVENOUS at 08:47

## 2020-03-14 RX ADMIN — HYDROMORPHONE HYDROCHLORIDE 0.5 MG: 1 INJECTION, SOLUTION INTRAMUSCULAR; INTRAVENOUS; SUBCUTANEOUS at 09:22

## 2020-03-14 RX ADMIN — ONDANSETRON 4 MG: 2 INJECTION INTRAMUSCULAR; INTRAVENOUS at 08:47

## 2020-03-14 RX ADMIN — LISINOPRIL 10 MG: 10 TABLET ORAL at 11:59

## 2020-03-14 RX ADMIN — SODIUM CHLORIDE: 9 INJECTION, SOLUTION INTRAVENOUS at 11:54

## 2020-03-14 RX ADMIN — OXYCODONE HYDROCHLORIDE 10 MG: 10 TABLET, FILM COATED, EXTENDED RELEASE ORAL at 11:59

## 2020-03-14 RX ADMIN — MONTELUKAST SODIUM 10 MG: 10 TABLET, FILM COATED ORAL at 20:29

## 2020-03-14 RX ADMIN — SODIUM CHLORIDE, PRESERVATIVE FREE 2 G: 5 INJECTION INTRAVENOUS at 11:54

## 2020-03-14 RX ADMIN — MORPHINE SULFATE 2 MG: 4 INJECTION, SOLUTION INTRAMUSCULAR; INTRAVENOUS at 17:32

## 2020-03-14 RX ADMIN — SODIUM CHLORIDE, PRESERVATIVE FREE 10 ML: 5 INJECTION INTRAVENOUS at 12:01

## 2020-03-14 RX ADMIN — SODIUM CHLORIDE: 9 INJECTION, SOLUTION INTRAVENOUS at 20:29

## 2020-03-14 RX ADMIN — IOPAMIDOL 90 ML: 755 INJECTION, SOLUTION INTRAVENOUS at 07:51

## 2020-03-14 RX ADMIN — OXYCODONE HYDROCHLORIDE 10 MG: 10 TABLET, FILM COATED, EXTENDED RELEASE ORAL at 20:30

## 2020-03-14 RX ADMIN — SODIUM CHLORIDE, POTASSIUM CHLORIDE, SODIUM LACTATE AND CALCIUM CHLORIDE 1000 ML: 600; 310; 30; 20 INJECTION, SOLUTION INTRAVENOUS at 06:49

## 2020-03-14 RX ADMIN — SODIUM CHLORIDE, PRESERVATIVE FREE 10 ML: 5 INJECTION INTRAVENOUS at 20:29

## 2020-03-14 ASSESSMENT — PAIN SCALES - GENERAL
PAINLEVEL_OUTOF10: 7
PAINLEVEL_OUTOF10: 7
PAINLEVEL_OUTOF10: 8
PAINLEVEL_OUTOF10: 10
PAINLEVEL_OUTOF10: 6
PAINLEVEL_OUTOF10: 8
PAINLEVEL_OUTOF10: 8

## 2020-03-14 ASSESSMENT — PAIN DESCRIPTION - DESCRIPTORS
DESCRIPTORS: ACHING;CRAMPING
DESCRIPTORS: CONSTANT

## 2020-03-14 ASSESSMENT — ENCOUNTER SYMPTOMS
BACK PAIN: 1
TROUBLE SWALLOWING: 0
SHORTNESS OF BREATH: 0
ABDOMINAL PAIN: 0
SORE THROAT: 0
FACIAL SWELLING: 0
NAUSEA: 1
DIARRHEA: 0
COUGH: 0
VOMITING: 0

## 2020-03-14 ASSESSMENT — PAIN DESCRIPTION - PAIN TYPE: TYPE: ACUTE PAIN

## 2020-03-14 ASSESSMENT — PAIN DESCRIPTION - LOCATION
LOCATION: BACK
LOCATION: BACK

## 2020-03-14 ASSESSMENT — PAIN DESCRIPTION - PROGRESSION: CLINICAL_PROGRESSION: NOT CHANGED

## 2020-03-14 ASSESSMENT — PAIN - FUNCTIONAL ASSESSMENT: PAIN_FUNCTIONAL_ASSESSMENT: ACTIVITIES ARE NOT PREVENTED

## 2020-03-14 ASSESSMENT — PAIN DESCRIPTION - FREQUENCY: FREQUENCY: CONTINUOUS

## 2020-03-14 ASSESSMENT — PAIN DESCRIPTION - ONSET: ONSET: ON-GOING

## 2020-03-14 ASSESSMENT — PAIN DESCRIPTION - ORIENTATION: ORIENTATION: RIGHT;LOWER

## 2020-03-14 NOTE — ED PROVIDER NOTES
[facility] EMERGENCY DEPT  eMERGENCY dEPARTMENT eNCOUnter      Pt Name: Kelton Gosselin  MRN: [id number]  [place] 1990  Date of evaluation: 3/14/2020  Provider: Eduardo Yousif MD    CHIEF COMPLAINT       Chief Complaint   Patient presents with    Generalized Body Aches     pt reports body aches. pt states that he is chemo pt for testicular cancer. Patient signed out at 7 AM by Dr. Kathryn Alegria work-up in progress. HISTORY OF PRESENT ILLNESS   (Location/Symptom, Timing/Onset,Context/Setting, Quality, Duration, Modifying Factors, Severity)  Note limiting factors. Kelton Gosselin is a 34 y.o. male who presents to the emergency department with right flank pain and lower back pain. Very pleasant 70-year-old who I diagnosed unfortunately with testicular cancer last time I saw him in the ER a couple months ago. He today comes in with thigh cramping and pain along with right lower back pain and flank pain. He did have a wisdom tooth swelling a couple weeks ago. He has not taken amoxicillin in a week he has no fevers. He last had chemo 8 days ago he tells me. He has no weakness in his legs. He has no cough or congestion. He has no real abdominal pain is really more right lower back pain consistent with what he came to the ER for when he had his testicular cancer and had adenopathy retroperitoneal.  Very pleasant he tried taking Lortab and Percocet at home but did not help his pain so he came to the ER. The history is provided by the patient and medical records. NursingNotes were reviewed. REVIEW OF SYSTEMS    (2-9 systems for level 4, 10 or more for level 5)     Review of Systems   Constitutional: Negative for fever. HENT: Negative for dental problem, drooling, facial swelling, sore throat and trouble swallowing. Respiratory: Negative for cough and shortness of breath. Cardiovascular: Negative for chest pain. Gastrointestinal: Negative for abdominal pain and vomiting.    Genitourinary: Positive for flank pain. Negative for dysuria, enuresis and frequency. Musculoskeletal: Positive for back pain. Skin: Negative for wound. Neurological: Negative for headaches. Psychiatric/Behavioral: Negative for confusion. A complete review of systems was performed and is negative except as noted above in the HPI. PAST MEDICAL HISTORY     Past Medical History:   Diagnosis Date    Back pain     Cancer ([facility] [address]     testicular    Hypertension     Lumbar stress fracture          SURGICAL HISTORY       Past Surgical History:   Procedure Laterality Date    TESTICLE REMOVAL Right 2/4/2020    RIGHT RADICAL ORCHIECTOMY performed by Mike Leone MD at [address] [facility]       Previous Medications    ALBUTEROL SULFATE  (90 BASE) MCG/ACT INHALER    USE 2 PUFFS Q 4 H PRN    HYDROCODONE-ACETAMINOPHEN (NORCO)  MG PER TABLET    Take 1 tablet by mouth every 6 hours as needed for Pain.     LISINOPRIL (PRINIVIL;ZESTRIL) 10 MG TABLET    Take 10 mg by mouth daily    MONTELUKAST (SINGULAIR) 10 MG TABLET    Take 10 mg by mouth nightly    ONDANSETRON (ZOFRAN ODT) 4 MG DISINTEGRATING TABLET    Take 1 tablet by mouth every 8 hours as needed for Nausea or Vomiting    PROMETHAZINE (PHENERGAN) 25 MG TABLET    Take 1 tablet by mouth every 6 hours as needed for Nausea       ALLERGIES     Nuts [peanut-containing drug products] and Other    FAMILY HISTORY       Family History   Problem Relation Age of Onset    High Blood Pressure Mother     Other Mother         Kidney Stones          SOCIAL HISTORY       Social History     Socioeconomic History    Marital status:      Spouse name: None    Number of children: None    Years of education: None    Highest education level: None   Occupational History    None   Social Needs    Financial resource strain: None    Food insecurity     Worry: None     Inability: None    Transportation needs     Medical: None     Non-medical: None Lymphocytes % 49.0 (*)     Basophils % 8.0 (*)     Neutrophils Absolute 0.4 (*)     Atypical Lymphocytes Relative 12 (*)     All other components within normal limits   LACTIC ACID, PLASMA - Abnormal; Notable for the following components:    Lactic Acid 2.1 (*)     All other components within normal limits    Narrative:     Sean Carlos tel. ,  Chemistry results called to and read back by Princess Turcios in ED, 03/14/2020  07:23, by WILTON   RAPID INFLUENZA A/B ANTIGENS   CULTURE, BLOOD 1   CULTURE, BLOOD 2   CULTURE, URINE   URINE RT REFLEX TO CULTURE   CK   LACTIC ACID, PLASMA       All other labs were within normal range or not returned as of this dictation. EMERGENCY DEPARTMENT COURSE and DIFFERENTIALDIAGNOSIS/MDM:   Vitals:    Vitals:    03/14/20 0701 03/14/20 0732 03/14/20 0832 03/14/20 0902   BP: 130/88 128/82 (!) 128/109 (!) 128/97   Pulse:   97 98   Resp:    20   Temp:       TempSrc:       SpO2: 97% 100% 98% 96%   Weight:       Height:           MDM  Number of Diagnoses or Management Options  Chemotherapy-induced neutropenia ([facility] [address]): Intractable pain:   Malignant neoplasm of testis, unspecified laterality, unspecified whether descended or undescended [facility]):   Diagnosis management comments: Very pleasant 79-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 Pain is intractable. Patient still has pain he was given IV morphine IV Dilaudid. He was given IV fluids his lactate trended down. Case discussed with Dr. Juan Antonio Boo. I am really not sure what to make of why he has thigh pain. There is no obvious neurological deficits. CT scan was okay with shrinking adenopathy. Again this pain seems to be the same as when he had his adenopathy when he presented to the ER with his cancer. The patient has already had his orchiectomy. The patient is not having fevers. I examined his teeth they were normal he was on antibiotics over a week ago. There is no abscess or pain now.   Given his need for pain control we will admit him to the hospital the hospitalist can consult oncology. The patient is stable at this time continue IV pain medication and monitoring for fever. He is neutropenic. Amount and/or Complexity of Data Reviewed  Clinical lab tests: ordered and reviewed  Tests in the radiology section of CPT®: reviewed  Decide to obtain previous medical records or to obtain history from someone other than the patient: yes  Discuss the patient with other providers: yes    Patient Progress  Patient progress: stable        CONSULTS:  None    PROCEDURES:  Unless otherwise notedbelow, none     Procedures    FINAL IMPRESSION     1. Intractable pain    2. Malignant neoplasm of testis, unspecified laterality, unspecified whether descended or undescended ([facility] [address]    3.  Chemotherapy-induced neutropenia [facility])          DISPOSITION/PLAN   DISPOSITION Admitted 03/14/2020 09:30:52 AM      PATIENT REFERRED TO:  Ana Rosa Dee DO  [address]  [address]  [phone]            DISCHARGE MEDICATIONS:  New Prescriptions    No medications on file          (Please note that portions of this note were completed with a voice recognition program.  Efforts were made to edit the dictations butoccasionally words are mis-transcribed.)    Vidhi Stephenson MD (electronically signed)  AttendingEmergency Physician         Vidhi Stephenson MD  03/14/20 7507

## 2020-03-14 NOTE — PROGRESS NOTES
Jagdeep Samuels arrived to room # [id number] [id number] [phone]. Presented with: Intractable pain  Mental Status: Patient is oriented, alert, coherent, logical, thought processes intact and able to concentrate and follow conversation. Vitals:    03/14/20 0902   BP: (!) 128/97   Pulse: 98   Resp: 20   Temp:    SpO2: 96%     Patient safety contract and falls prevention contract reviewed with patient Yes. Oriented Patient to room. Call light within reach. Yes.   Needs, issues or concerns expressed at this time: no.      Electronically signed by Masood Jarrell RN on 3/14/2020 at 10:23 AM

## 2020-03-14 NOTE — PROGRESS NOTES
Nutrition Assessment    Type and Reason for Visit: Initial, Positive Nutrition Screen    Nutrition Assessment: Pt is nutritionally compromised AEB decreased intake and wt loss r/t chemo tx. Pt at risk of further compromise d/t decreased appetite and ongoing tx. Pt declines ONS at this time. Malnutrition Assessment:  · Malnutrition Status: At risk for malnutrition  · Context: Chronic illness  · Findings of the 6 clinical characteristics of malnutrition (Minimum of 2 out of 6 clinical characteristics is required to make the diagnosis of moderate or severe Protein Calorie Malnutrition based on AND/ASPEN Guidelines):  1. Energy Intake-Less than or equal to 75% of estimated energy requirement, Greater than or equal to 1 month    2. Weight Loss-2% loss or greater, in 1 month  3. Fat Loss-No significant subcutaneous fat loss,    4. Muscle Loss-No significant muscle mass loss,    5. Fluid Accumulation-No significant fluid accumulation,    6.  Strength-Not measured    Nutrition Risk Level:  Moderate    Nutrient Needs:  · Estimated Daily Total Kcal: 5014-7113(20-25 kcal/kg)  · Estimated Daily Protein (g): (1.2-1.5 g/kg)  · Estimated Daily Total Fluid (ml/day): 2369-5869    Nutrition Diagnosis:   · Problem: Unintended weight loss  · Etiology: related to Insufficient energy/nutrient consumption     Signs and symptoms:  as evidenced by Weight loss    Objective Information:  · Current Nutrition Therapies:  · Oral Diet Orders: General   · Oral Diet intake: Unable to assess  · Anthropometric Measures:  · Ht: 5' 11\" (180.3 cm)   · Admission Body Wt: 260 lb (117.9 kg)  · Usual Body Wt: 278 lb (126.1 kg)  · % Weight Change:  ,  4.3% wt loss past month  · Ideal Body Wt: 160 lb (72.6 kg),   · BMI Classification: BMI 35.0 - 39.9 Obese Class II    Nutrition Interventions:   Continue current diet  Continued Inpatient Monitoring    Nutrition Evaluation:   · Evaluation: Goals set   · Goals: PO 50% or more, wt stable

## 2020-03-14 NOTE — H&P
Coag Panel: No results for input(s): INR, PROTIME, APTT in the last 72 hours. Cardiac Enzymes:   Recent Labs     03/14/20  0719   CKTOTAL 57     ABGs:No results found for: PHART, PO2ART, PIG1NRA  Urinalysis:  Lab Results   Component Value Date    NITRU Negative 03/14/2020    WBCUA 0-1 06/20/2018    RBCUA 6-10 06/20/2018    BLOODU Negative 03/14/2020    SPECGRAV 1.019 03/14/2020    GLUCOSEU Negative 03/14/2020     RAD:     XR CHEST STANDARD (2 VW) [[id number]] Resulted: 03/14/20 1011      Order Status: Completed Specimen: Chest Updated: 03/14/20 1013     Narrative:       XR CHEST (2 VW) 3/14/2020 8:15 AM  HISTORY: Chest pain, fatigue  COMPARISON: None. FINDINGS:   No lung consolidation. No pleural effusion or pneumothorax. The  cardiomediastinal silhouette and pulmonary vascularity are within  normal limits. The osseous structures and surrounding soft tissues  demonstrate no acute abnormality.     Impression:       1. No radiographic evidence of acute cardiopulmonary process. No  visualized infiltrate. Signed by Dr Yusra Elias on 3/14/2020 10:11 AM     CT ABDOMEN PELVIS W IV CONTRAST Additional Contrast? None [[id number]] Resulted: 03/14/20 0817     Order Status: Completed Updated: 03/14/20 0819     Narrative:       CT ABDOMEN PELVIS W IV CONTRAST 3/14/2020 5:45 AM  HISTORY: Abdominal pain, flank pain, history of testicular cancer with  metastatic disease  COMPARISON: CT scan dated 2/3/2020. DLP: 1671 mGy cm  TECHNIQUE: Following the uneventful administration of intravenous  iodinated contrast, helical CT tomographic images of the abdomen and  pelvis were acquired. Coronal reformatted images were also provided  for review. Automated exposure control was also utilized to decrease  patient radiation dose. FINDINGS:   The lung bases and base of the heart are unremarkable. LIVER: No focal liver lesion. The hepatic vasculature is patent. BILIARY SYSTEM: The gallbladder is unremarkable.  No intrahepatic

## 2020-03-14 NOTE — ED PROVIDER NOTES
[facility]  e[facility]Y dEPARTMENT eNCOUnter      Pt Name: Gonsalo Pendleton  MRN: [id number]  [place] 1990  Date of evaluation: 3/14/2020  Provider: Roxana Young MD    CHIEF COMPLAINT       Chief Complaint   Patient presents with    Generalized Body Aches     pt reports body aches. pt states that he is chemo pt for testicular cancer. HISTORY OF PRESENT ILLNESS   (Location/Symptom, Timing/Onset,Context/Setting, Quality, Duration, Modifying Factors, Severity)  Note limiting factors. Gonsalo Pendleton is a 34 y.o. male who presents to the emergency department for evaluation of moderate severity abdominal pain, flank pain and back pain. Patient reports that the symptoms began last night, have been a moderate severity and fairly constant in nature. He describes it as a deep aching type pain. Patient was recently diagnosed with a right testicular mass that turned out to be metastatic testicular cancer. He underwent a right orchiectomy by Dr. Aarti Cherry on 2/4. Reports he is actually been doing fairly well in the postoperative period and is now started his chemotherapy under the management of Dr. Samanta Fong. His surgical pathology revealed a seminoma with a metastatic mass in the retroperitoneal area. Reports that he does have a prior history of kidney stones however this does not feel like any prior kidney stone he has had. He has had some nausea but denies vomiting. There have really been no relieving factors for the pain itself. HPI    NursingNotes were reviewed. REVIEW OF SYSTEMS    (2-9 systems for level 4, 10 or more for level 5)     Review of Systems   Constitutional: Negative for chills and fever. Respiratory: Negative for shortness of breath. Cardiovascular: Negative for chest pain. Gastrointestinal: Positive for nausea. Negative for abdominal pain, diarrhea and vomiting. Genitourinary: Positive for flank pain. Musculoskeletal: Positive for back pain.    All other systems Substance and Sexual Activity    Alcohol use: No    Drug use: No    Sexual activity: Yes     Partners: Female   Lifestyle    Physical activity     Days per week: None     Minutes per session: None    Stress: None   Relationships    Social connections     Talks on phone: None     Gets together: None     Attends Anabaptist service: None     Active member of club or organization: None     Attends meetings of clubs or organizations: None     Relationship status: None    Intimate partner violence     Fear of current or ex partner: None     Emotionally abused: None     Physically abused: None     Forced sexual activity: None   Other Topics Concern    None   Social History Narrative    None       SCREENINGS    Aiken Coma Scale  Eye Opening: Spontaneous  Best Verbal Response: Oriented  Best Motor Response: Obeys commands  Ajay Coma Scale Score: 15        PHYSICAL EXAM    (up to 7 for level 4, 8 or more for level 5)     ED Triage Vitals   BP Temp Temp Source Pulse Resp SpO2 Height Weight   03/14/20 0545 03/14/20 0545 03/14/20 0545 03/14/20 0545 03/14/20 0545 03/14/20 0545 03/14/20 0544 03/14/20 0544   (!) 141/91 97.7 °F (36.5 °C) Oral 104 20 95 % 5' 11\" (1.803 m) 260 lb (117.9 kg)       Physical Exam  Vitals signs and nursing note reviewed. HENT:      Head: Atraumatic. Nose: No congestion. Mouth/Throat:      Mouth: Mucous membranes are not dry. Pharynx: No posterior oropharyngeal erythema. Eyes:      General: No scleral icterus. Pupils: Pupils are equal, round, and reactive to light. Neck:      Trachea: No tracheal deviation. Cardiovascular:      Rate and Rhythm: Normal rate and regular rhythm. Heart sounds: Normal heart sounds. No murmur. Pulmonary:      Effort: Pulmonary effort is normal. No respiratory distress. Breath sounds: Normal breath sounds. No stridor. Abdominal:      General: There is no distension. Palpations: Abdomen is soft. Tenderness:  There is no abdominal tenderness. There is no guarding. Musculoskeletal:        Back:       Right lower leg: No edema. Left lower leg: No edema. Skin:     Capillary Refill: Capillary refill takes less than 2 seconds. Coloration: Skin is not pale. Findings: No rash. Neurological:      Mental Status: He is alert and oriented to person, place, and time. Psychiatric:         Behavior: Behavior is cooperative. DIAGNOSTIC RESULTS     RADIOLOGY:  Non-plain film images such as CT, Ultrasound and MRI are read by the radiologist. Plain radiographic images are visualized and preliminarily interpreted bythe emergency physician with the below findings:      XR CHEST STANDARD (2 VW)   Final Result   1. No radiographic evidence of acute cardiopulmonary process. No   visualized infiltrate. Signed by Dr Brannon Redding on 3/14/2020 10:11 AM      CT ABDOMEN PELVIS W IV CONTRAST Additional Contrast? None   Final Result   1. No acute process in the abdomen or pelvis. 2. Prior right orchiectomy. Decreasing retroperitoneal/aortocaval   adenopathy. No new or worsening adenopathy identified. 3. 2 mm nonobstructing left renal stone. No obstructing stones   identified. 4. Bilateral L4 spondylolysis without spondylolisthesis.    Signed by Dr Brannon Redding on 3/14/2020 8:17 AM              LABS:  Labs Reviewed   COMPREHENSIVE METABOLIC PANEL - Abnormal; Notable for the following components:       Result Value    Glucose 115 (*)     Total Protein 6.5 (*)     ALT 45 (*)     All other components within normal limits   CBC WITH AUTO DIFFERENTIAL - Abnormal; Notable for the following components:    WBC 2.1 (*)     RBC 4.23 (*)     Hemoglobin 12.6 (*)     Hematocrit 37.3 (*)     RDW 11.1 (*)     Platelets 87 (*)     Neutrophils % 25.0 (*)     Lymphocytes % 53.0 (*)     Basophils % 7.0 (*)     Neutrophils Absolute 0.7 (*)     Myelocyte Percent 4 (*)     Promyelocytes Percent 3 (*)     nRBC 2 (*)     All other components within normal limits   LACTIC ACID, PLASMA - Abnormal; Notable for the following components:    Lactic Acid 2.1 (*)     All other components within normal limits    Narrative:     Rama Jamepavithra tel. ,  Chemistry results called to and read back by Brianna Shaw in ED, 03/14/2020  07:23, by WILTON   CBC WITH AUTO DIFFERENTIAL - Abnormal; Notable for the following components:    RBC 3.65 (*)     Hemoglobin 11.0 (*)     Hematocrit 32.2 (*)     RDW 11.1 (*)     Platelets 52 (*)     Neutrophils % 29.0 (*)     Basophils % 2.0 (*)     Bands Relative 7 (*)     Myelocyte Percent 18 (*)     Promyelocytes Percent 7 (*)     nRBC 2 (*)     All other components within normal limits   COMPREHENSIVE METABOLIC PANEL - Abnormal; Notable for the following components:    BUN 5 (*)     Calcium 7.9 (*)     Total Protein 5.0 (*)     Alb 3.0 (*)     All other components within normal limits   MAGNESIUM - Abnormal; Notable for the following components:    Magnesium 1.5 (*)     All other components within normal limits   RAPID INFLUENZA A/B ANTIGENS   CULTURE, BLOOD 1    Narrative:     ORDER#: [id number]                          ORDERED BY: MILO SANTACRUZ  SOURCE: Blood Antecubital-Rig              COLLECTED:  03/14/20 10:03  ANTIBIOTICS AT DICK.:                      RECEIVED :  03/14/20 10:06   CULTURE, BLOOD 2    Narrative:     ORDER#: [id number]                          ORDERED BY: MILO SANTACRUZ  SOURCE: Blood Antecubital-Lef              COLLECTED:  03/14/20 09:45  ANTIBIOTICS AT DICK.:                      RECEIVED :  03/14/20 10:06   CULTURE, URINE    Narrative:     ORDER#: [id number]                          ORDERED BY: MILO SANTACRUZ  SOURCE: Urine Clean Catch                  COLLECTED:  03/14/20 09:18  ANTIBIOTICS AT DICK.:                      RECEIVED :  03/14/20 09:18   URINE RT REFLEX TO CULTURE   CK   LACTIC ACID, PLASMA   TROPONIN   PROTIME-INR   APTT   PHOSPHORUS       All other labs were within normal range or not

## 2020-03-14 NOTE — PROGRESS NOTES
4 Eyes Skin Assessment    Tracey Osler is being assessed upon: Admission    I agree that I, Renata Dee, along with Charles Purdy RN (either 2 RN's or 1 LPN and 1 RN) have performed a thorough Head to Toe Skin Assessment on the patient. ALL assessment sites listed below have been assessed. Areas assessed by both nurses:     [x]   Head, Face, and Ears   [x]   Shoulders, Back, and Chest  [x]   Arms, Elbows, and Hands   [x]   Coccyx, Sacrum, and Ischium  [x]   Legs, Feet, and Heels    Does the Patient have Skin Breakdown?  No    Иван Prevention initiated: No  Wound Care Orders initiated: No    WOC nurse consulted for Pressure Injury (Stage 3,4, Unstageable, DTI, NWPT, and Complex wounds) and New or Established Ostomies: No        Primary Nurse eSignature: Renata Dee RN on 3/14/2020 at 10:23 AM      Co-Signer eSignature: {Esignature:[id number]}

## 2020-03-14 NOTE — PLAN OF CARE
Problem: Falls - Risk of:  Goal: Will remain free from falls  Description: Will remain free from falls  Outcome: Ongoing  Goal: Absence of physical injury  Description: Absence of physical injury  Outcome: Ongoing     Problem: Pain:  Goal: Pain level will decrease  Description: Pain level will decrease  Outcome: Ongoing  Goal: Control of acute pain  Description: Control of acute pain  Outcome: Ongoing  Goal: Control of chronic pain  Description: Control of chronic pain  Outcome: Ongoing     Problem: Activity:  Goal: Ability to perform activities at highest level will improve  Description: Ability to perform activities at highest level will improve  Outcome: Ongoing     Problem:  Bowel/Gastric:  Goal: Occurrences of nausea will decrease  Description: Occurrences of nausea will decrease  Outcome: Ongoing  Goal: Bowel function will improve  Description: Bowel function will improve  Outcome: Ongoing     Problem: Cardiac:  Goal: Ability to maintain an adequate cardiac output will improve  Description: Ability to maintain an adequate cardiac output will improve  Outcome: Ongoing     Problem: Health Behavior:  Goal: Identification of resources available to assist in meeting health care needs will improve  Description: Identification of resources available to assist in meeting health care needs will improve  Outcome: Ongoing     Problem: Nutritional:  Goal: Maintenance of adequate weight for body size and type will improve  Description: Maintenance of adequate weight for body size and type will improve  Outcome: Ongoing     Problem: Physical Regulation:  Goal: Complications related to the disease process, condition or treatment will be avoided or minimized  Description: Complications related to the disease process, condition or treatment will be avoided or minimized  Outcome: Ongoing     Problem: Self-Concept:  Goal: Ability to accept self in the situation and incorporate positive changes in behavior will improve  Description: Ability to accept self in the situation and incorporate positive changes in behavior will improve  Outcome: Ongoing     Problem: Sensory:  Goal: Pain level will decrease  Description: Pain level will decrease  Outcome: Ongoing  Goal: Demonstrations of a stable neurologic state will increase  Description: Demonstrations of a stable neurologic state will increase  Outcome: Ongoing     Problem: Skin Integrity:  Goal: Status of oral mucous membranes will improve  Description: Status of oral mucous membranes will improve  Outcome: Ongoing  Goal: Complications related to intravenous access or infusion will be avoided or minimized  Description: Complications related to intravenous access or infusion will be avoided or minimized  Outcome: Ongoing

## 2020-03-14 NOTE — ED TRIAGE NOTES
Patient states that body aches started at 2pm yesterday. Pt states that he is on chemo for testicular cancer. Pt states that he has had 2 weeks of treatments. Pt denies fever or any other symptoms at this time. Pt reports body aches in legs, hips and back. Pt states that he recently started a new medication. Pt states that he is unsure if it is causing his symptoms. Pt unsure of name of medication.

## 2020-03-14 NOTE — CONSULTS
<1  · 4/58/5404- complicated neutropenia with oral infection. Recommend to continue with amoxicillin. Will add Neupogen. We will also add Neulasta to cycle #2. · Cycle #2 of chemotherapy delivered between 3/2/2020 and 3/6/2020 followed by Neulasta.          Past Medical History:    Past Medical History:   Diagnosis Date    Back pain     Cancer ([address]     testicular    Hypertension     Lumbar stress fracture          Past Surgical History:    Past Surgical History:   Procedure Laterality Date    TESTICLE REMOVAL Right 2/4/2020    RIGHT RADICAL ORCHIECTOMY performed by Beni Weiss MD at [address] [place] [address]         Immunizations: There is no immunization history on file for this patient.       Current Hospital Medications:    Current Facility-Administered Medications   Medication Dose Route Frequency Provider Last Rate Last Dose    sodium chloride flush 0.9 % injection 10 mL  10 mL Intravenous 2 times per day Zunilda Piasno MD        sodium chloride flush 0.9 % injection 10 mL  10 mL Intravenous PRN Zunilda Pisano MD        acetaminophen (TYLENOL) tablet 650 mg  650 mg Oral Q6H PRN Zunilda Pisano MD        Or    acetaminophen (TYLENOL) suppository 650 mg  650 mg Rectal Q6H PRN Zunilda Pisano MD        polyethylene glycol [facility]) packet 17 g  17 g Oral Daily PRN Zunilda Pisano MD        promethazine (PHENERGAN) tablet 12.5 mg  12.5 mg Oral Q6H PRN Zunilda Pisano MD        Or    [facility]ron [facility] injection 4 mg  4 mg Intravenous Q6H PRN Zunilda Pisano MD        magnesium sulfate 2 g in 50 mL IVPB premix  2 g Intravenous PRN Zunilda Pisano MD        potassium chloride (KLOR-CON M) extended release tablet 40 mEq  40 mEq Oral PRN Zunilda Pisano MD        Or    potassium bicarb-citric acid (EFFER-K) effervescent tablet 40 mEq  40 mEq Oral PRN Zunilda Pisano MD        Or    potassium chloride 10 mEq/100 mL IVPB (Peripheral Line)  10 mEq Intravenous PRN Zunilda Pisano MD        morphine injection 2 mg  2 mg Intravenous Q4H PRN Cornelia Limon MD        oxyCODONE (OXYCONTIN) extended release tablet 10 mg  10 mg Oral 2 times per day Cornelia Limon MD        ipratropium-albuterol (DUONEB) nebulizer solution 1 ampule  1 ampule Inhalation Q4H PRN Cornelia Limon MD        montelukast (SINGULAIR) tablet 10 mg  10 mg Oral Nightly Cornelia Limon MD        lisinopril (PRINIVIL;ZESTRIL) tablet 10 mg  10 mg Oral Daily Cornelia Limon MD        ceFEPIme (MAXIPIME) 2 g in sodium chloride (PF) 20 mL IV syringe  2 g Intravenous Q12H Cornelia Lmion MD        0.9 % sodium chloride infusion   Intravenous Continuous Cornelia Limon MD             Allergies:    Allergies   Allergen Reactions    Nuts [Peanut-Containing Drug Products]     Other      peanuts         Social History:    Social History     Socioeconomic History    Marital status:      Spouse name: None    Number of children: None    Years of education: None    Highest education level: None   Occupational History    None   Social Needs    Financial resource strain: None    Food insecurity     Worry: None     Inability: None    Transportation needs     Medical: None     Non-medical: None   Tobacco Use    Smoking status: Former Smoker     Packs/day: 0.50    Smokeless tobacco: Never Used   Substance and Sexual Activity    Alcohol use: No    Drug use: No    Sexual activity: Yes     Partners: Female   Lifestyle    Physical activity     Days per week: None     Minutes per session: None    Stress: None   Relationships    Social connections     Talks on phone: None     Gets together: None     Attends [employer] service: None     Active member of club or organization: None     Attends meetings of clubs or organizations: None     Relationship status: None    Intimate partner violence     Fear of current or ex partner: None     Emotionally abused: None     Physically abused: None     Forced sexual activity: None   Other Topics Concern    None   Social History Narrative  None         Family History:   Family History   Problem Relation Age of Onset    High Blood Pressure Mother     Other Mother         Kidney Stones       Review of Systems:  Constitutional: Negative for chills, fatigue, fever or significant weight loss. HENT: Negative for congestion, hearing loss, nosebleeds or sore throat. Eyes: Negative for photophobia, pain, discharge, redness and visual disturbance. Respiratory: Negative for cough, shortness of breath, or wheezing. Cardiovascular: Negative for chest pain, palpitations or leg swelling. Gastrointestinal: Negative for abdominal pain, blood in stool, constipation, diarrhea, nausea or vomiting. Genitourinary: Negative for dysuria, flank pain, frequency, hematuria or urgency. Musculoskeletal: Negative for back pain, joint swelling, myalgias or neck pain. Skin: Negative for rash or petechiae. Neurological: Negative for tremors, seizures, syncope, weakness or headaches. Hematological: No active bruising or bleeding. Psychiatric/Behavioral: Negative for hallucinations. Objective:  Vitals:    Vitals:    03/14/20 0902   BP: (!) 128/97   Pulse: 98   Resp: 20   Temp:    SpO2: 96%       Physical Exam   Constitutional: Oriented to person, place, and time. No acute distress. Head: Normocephalic and atraumatic. Nose: Nose normal.   Mouth/Throat: Oropharynx is clear and moist. No oropharyngeal exudate. Eyes: Pupils are equal and round. Conjunctivae and EOM are normal. No scleral icterus. Neck: Normal range of motion. Neck supple. No JVD. No appreciable thyromegaly. Cardiovascular: Normal rate, regular rhythm, normal heart sounds and intact distal pulses. Exam reveals no gallop, murmurs or friction rub. Pulmonary/Chest: Effort normal and breath sounds normal. No respiratory distress. No wheezes. Abdominal: Soft. Bowel sounds are normal. No organomegally or masses. No tenderness. There is no rebound and no guarding. Musculoskeletal: Normal range of motion. No edema or tenderness. Lymphadenopathy: No cervical, axillary or inguinal lymphadenopathy. Neurological: Alert and oriented to person, place, and time. Cranial nerves are intact. Neurological exam is nonfocal  Skin: Skin is warm and dry. No rash noted. No erythema. No pallor. Psychiatric: Judgment normal.         DATA:    Labs:  General Labs:  CBC:   Lab Results   Component Value Date    WBC 2.1 (L) 03/14/2020    HGB 12.6 (L) 03/14/2020    HCT 37.3 (L) 03/14/2020    MCV 88.2 03/14/2020    PLT 87 (L) 03/14/2020       CMP:    Lab Results   Component Value Date     03/14/2020    K 3.7 03/14/2020     03/14/2020    CO2 22 03/14/2020    BUN 9 03/14/2020    CREATININE 0.7 03/14/2020    GLUCOSE 115 (H) 03/14/2020    CALCIUM 9.1 03/14/2020    PROT 6.5 (L) 03/14/2020    LABALBU 4.1 03/14/2020    BILITOT 0.5 03/14/2020    ALKPHOS 114 03/14/2020    AST 23 03/14/2020    ALT 45 (H) 03/14/2020    LABGLOM >60 03/14/2020         30 Day lookback of cultures:    Blood Culture Recent: No results for input(s): BC in the last 720 hours. Gram Stain Recent: No results for input(s): LABGRAM in the last 720 hours. Resp Culture Recent: No results for input(s): CULTRESP in the last 720 hours. Body Fluid Recent : No results for input(s): BFCX in the last 720 hours. MRSA Recent : No results for input(s): [address] in the last 720 hours. Urine Culture Recent : No results for input(s): LABURIN in the last 720 hours. Organism Recent : No results for input(s): ORG in the last 720 hours. IMPRESSION/RECOMMENDATIONS:    Camila Ta is a pleasant 29-year-old  gentleman with a diagnosis of stage IIB ([id number]) pure seminoma. Camila Ta received cycle #2 of Cisplatin/-16 chemotherapy between 3/2/2020 and 3/6/2020 followed by [facility].      Mr. Oswaldo Sam was admitted through the emergency room with new onset of extreme intractable body aches and pains as well as marked neutropenia for management and observation. Oncology consultation requested. On examination, there is no evidence of palpable peripheral lymphadenopathy in the head and neck area axilla or inguinal areas. The lungs are clear the heart is regular the abdomen is soft and benign without organomegaly masses nor tenderness. Examination of the extremities and chest wall where he complained of the largest amount of pain does not reveal areas to re-elicit pain. Pressure on the thighs, the primary area of his pain on admission does not elicit further pain. The pain appears to be deep inside according to his history, nonspecific. #1  Right orchiectomy for stage IIB ([id number]) pure seminoma 2/4/2020. CT scan of the abdomen and pelvis on 3/14/2020 was personally reviewed by me and also compared to the initial presenting abdominal CT scan from 2/3/2020 with dramatic reduction in size of the initial 3.7 cm retroperitoneal lymph node mass-effect down to 1.7 cm on the current CT,  measured in short axis. #2  Cycle #2 of Cisplatin/-16 chemotherapy between 3/2/2020 and 3/6/2020 followed by Neulasta    #3  Neutropenia with WBC of 2.1 and ANC of 0.4 without fever  Cultures taken patient being monitored without antibiotics. Agree with measures taken    #4  Generalized muscle aches and cramps and pain unresponsive to hydrocodone at home, improved on admission after analgesics.        Tommie Marie    03/14/20  11:44 AM

## 2020-03-14 NOTE — ED NOTES
ASSESSMENT:    PT ALERT/ORIENTED X4. PUPILS EQUAL/REACTIVE    SKIN:  WARM/DRY PINK CAPILLARY REFILL < 2SECS    CARDIAC:  S1 S2 NOTED     LUNGS: CLEAR UPPER AND LOWER LOBES, RESPIRATIONS EVEN/UNLABORED     ABDOMEN: BOWEL SOUNDS NOTED UPPER AND LOWER QUADRANTS                     SOFT AND NONTENDER. EXTREMITIES:  BILATERAL DP AND PT AND NO EDEMA NOTED. Pt reports body aches in bilateral thighs, hips, and low back. Pt states symptoms started yesterday. NO DISTRESS NOTED. SIDE RAILS UP AND CALL LIGHT IN REACH.          Daniel Morrison RN  03/14/20 2960

## 2020-03-15 VITALS
OXYGEN SATURATION: 94 % | TEMPERATURE: 98.7 F | BODY MASS INDEX: 36.4 KG/M2 | HEIGHT: 71 IN | SYSTOLIC BLOOD PRESSURE: 123 MMHG | HEART RATE: 106 BPM | RESPIRATION RATE: 14 BRPM | WEIGHT: 260 LBS | DIASTOLIC BLOOD PRESSURE: 71 MMHG

## 2020-03-15 LAB
ALBUMIN SERPL-MCNC: 3 G/DL (ref 3.5–5.2)
ALP BLD-CCNC: 78 U/L (ref 40–130)
ALT SERPL-CCNC: 32 U/L (ref 5–41)
ANION GAP SERPL CALCULATED.3IONS-SCNC: 9 MMOL/L (ref 7–19)
APTT: 28.5 SEC (ref 26–36.2)
AST SERPL-CCNC: 22 U/L (ref 5–40)
ATYPICAL LYMPHOCYTE RELATIVE PERCENT: 0 % (ref 0–8)
ATYPICAL LYMPHOCYTE RELATIVE PERCENT: 2 % (ref 0–8)
BANDED NEUTROPHILS RELATIVE PERCENT: 7 % (ref 0–5)
BASOPHILS ABSOLUTE: 0.1 K/UL (ref 0–0.2)
BASOPHILS ABSOLUTE: 0.1 K/UL (ref 0–0.2)
BASOPHILS RELATIVE PERCENT: 2 % (ref 0–1)
BASOPHILS RELATIVE PERCENT: 7 % (ref 0–1)
BILIRUB SERPL-MCNC: 0.4 MG/DL (ref 0.2–1.2)
BUN BLDV-MCNC: 5 MG/DL (ref 6–20)
CALCIUM SERPL-MCNC: 7.9 MG/DL (ref 8.6–10)
CHLORIDE BLD-SCNC: 104 MMOL/L (ref 98–111)
CO2: 24 MMOL/L (ref 22–29)
CREAT SERPL-MCNC: 0.6 MG/DL (ref 0.5–1.2)
EKG P AXIS: 24 DEGREES
EKG P-R INTERVAL: 130 MS
EKG Q-T INTERVAL: 322 MS
EKG QRS DURATION: 82 MS
EKG QTC CALCULATION (BAZETT): 399 MS
EKG T AXIS: 8 DEGREES
EOSINOPHILS ABSOLUTE: 0 K/UL (ref 0–0.6)
EOSINOPHILS ABSOLUTE: 0 K/UL (ref 0–0.6)
EOSINOPHILS RELATIVE PERCENT: 0 % (ref 0–5)
EOSINOPHILS RELATIVE PERCENT: 0 % (ref 0–5)
GFR NON-AFRICAN AMERICAN: >60
GLUCOSE BLD-MCNC: 85 MG/DL (ref 74–109)
HCT VFR BLD CALC: 32.2 % (ref 42–52)
HCT VFR BLD CALC: 37.3 % (ref 42–52)
HEMOGLOBIN: 11 G/DL (ref 14–18)
HEMOGLOBIN: 12.6 G/DL (ref 14–18)
IMMATURE GRANULOCYTES #: 0.2 K/UL
IMMATURE GRANULOCYTES #: 0.3 K/UL
INR BLD: 1.11 (ref 0.88–1.18)
LYMPHOCYTES ABSOLUTE: 1.2 K/UL (ref 1.1–4.5)
LYMPHOCYTES ABSOLUTE: 2 K/UL (ref 1.1–4.5)
LYMPHOCYTES RELATIVE PERCENT: 35 % (ref 20–40)
LYMPHOCYTES RELATIVE PERCENT: 53 % (ref 20–40)
MAGNESIUM: 1.5 MG/DL (ref 1.6–2.6)
MCH RBC QN AUTO: 29.8 PG (ref 27–31)
MCH RBC QN AUTO: 30.1 PG (ref 27–31)
MCHC RBC AUTO-ENTMCNC: 33.8 G/DL (ref 33–37)
MCHC RBC AUTO-ENTMCNC: 34.2 G/DL (ref 33–37)
MCV RBC AUTO: 88.2 FL (ref 80–94)
MCV RBC AUTO: 88.2 FL (ref 80–94)
MONOCYTES ABSOLUTE: 0.1 K/UL (ref 0–0.9)
MONOCYTES ABSOLUTE: 0.1 K/UL (ref 0–0.9)
MONOCYTES RELATIVE PERCENT: 2 % (ref 0–10)
MONOCYTES RELATIVE PERCENT: 6 % (ref 0–10)
MYELOCYTE PERCENT: 18 %
MYELOCYTE PERCENT: 4 %
NEUTROPHILS ABSOLUTE: 0.7 K/UL (ref 1.5–7.5)
NEUTROPHILS ABSOLUTE: 3.4 K/UL (ref 1.5–7.5)
NEUTROPHILS RELATIVE PERCENT: 25 % (ref 50–65)
NEUTROPHILS RELATIVE PERCENT: 29 % (ref 50–65)
NUCLEATED RED BLOOD CELLS: 2 /100 WBC
NUCLEATED RED BLOOD CELLS: 2 /100 WBC
PDW BLD-RTO: 11.1 % (ref 11.5–14.5)
PDW BLD-RTO: 11.1 % (ref 11.5–14.5)
PHOSPHORUS: 4 MG/DL (ref 2.5–4.5)
PLATELET # BLD: 52 K/UL (ref 130–400)
PLATELET # BLD: 87 K/UL (ref 130–400)
PLATELET SLIDE REVIEW: ABNORMAL
PLATELET SLIDE REVIEW: ABNORMAL
PMV BLD AUTO: 10.4 FL (ref 9.4–12.4)
PMV BLD AUTO: 10.9 FL (ref 9.4–12.4)
POTASSIUM SERPL-SCNC: 3.8 MMOL/L (ref 3.5–5)
PROMYELOCYTES PERCENT: 3 %
PROMYELOCYTES PERCENT: 7 %
PROTHROMBIN TIME: 14.3 SEC (ref 12–14.6)
RBC # BLD: 3.65 M/UL (ref 4.7–6.1)
RBC # BLD: 4.23 M/UL (ref 4.7–6.1)
RBC # BLD: NORMAL 10*6/UL
RBC # BLD: NORMAL 10*6/UL
SODIUM BLD-SCNC: 137 MMOL/L (ref 136–145)
TOTAL PROTEIN: 5 G/DL (ref 6.6–8.7)
WBC # BLD: 2.1 K/UL (ref 4.8–10.8)
WBC # BLD: 5.6 K/UL (ref 4.8–10.8)

## 2020-03-15 PROCEDURE — 84100 ASSAY OF PHOSPHORUS: CPT

## 2020-03-15 PROCEDURE — 36415 COLL VENOUS BLD VENIPUNCTURE: CPT

## 2020-03-15 PROCEDURE — G0378 HOSPITAL OBSERVATION PER HR: HCPCS

## 2020-03-15 PROCEDURE — 99233 SBSQ HOSP IP/OBS HIGH 50: CPT | Performed by: INTERNAL MEDICINE

## 2020-03-15 PROCEDURE — 80053 COMPREHEN METABOLIC PANEL: CPT

## 2020-03-15 PROCEDURE — 85025 COMPLETE CBC W/AUTO DIFF WBC: CPT

## 2020-03-15 PROCEDURE — 6360000002 HC RX W HCPCS: Performed by: INTERNAL MEDICINE

## 2020-03-15 PROCEDURE — 83735 ASSAY OF MAGNESIUM: CPT

## 2020-03-15 PROCEDURE — 85730 THROMBOPLASTIN TIME PARTIAL: CPT

## 2020-03-15 PROCEDURE — 96376 TX/PRO/DX INJ SAME DRUG ADON: CPT

## 2020-03-15 PROCEDURE — 2580000003 HC RX 258: Performed by: INTERNAL MEDICINE

## 2020-03-15 PROCEDURE — 85610 PROTHROMBIN TIME: CPT

## 2020-03-15 PROCEDURE — 6370000000 HC RX 637 (ALT 250 FOR IP): Performed by: INTERNAL MEDICINE

## 2020-03-15 RX ADMIN — MORPHINE SULFATE 2 MG: 4 INJECTION, SOLUTION INTRAMUSCULAR; INTRAVENOUS at 11:39

## 2020-03-15 RX ADMIN — OXYCODONE HYDROCHLORIDE 10 MG: 10 TABLET, FILM COATED, EXTENDED RELEASE ORAL at 08:44

## 2020-03-15 RX ADMIN — SODIUM CHLORIDE, PRESERVATIVE FREE 2 G: 5 INJECTION INTRAVENOUS at 00:30

## 2020-03-15 RX ADMIN — SODIUM CHLORIDE, PRESERVATIVE FREE 10 ML: 5 INJECTION INTRAVENOUS at 08:45

## 2020-03-15 RX ADMIN — MORPHINE SULFATE 2 MG: 4 INJECTION, SOLUTION INTRAMUSCULAR; INTRAVENOUS at 03:32

## 2020-03-15 RX ADMIN — ACETAMINOPHEN 650 MG: 325 TABLET ORAL at 00:34

## 2020-03-15 RX ADMIN — LISINOPRIL 10 MG: 10 TABLET ORAL at 08:44

## 2020-03-15 RX ADMIN — SODIUM CHLORIDE, PRESERVATIVE FREE 2 G: 5 INJECTION INTRAVENOUS at 11:31

## 2020-03-15 ASSESSMENT — ENCOUNTER SYMPTOMS
CONSTIPATION: 0
SORE THROAT: 0
WHEEZING: 0
EYE REDNESS: 0
TROUBLE SWALLOWING: 0
PHOTOPHOBIA: 0
EYE ITCHING: 0
VOMITING: 0
ABDOMINAL PAIN: 1
SHORTNESS OF BREATH: 0
DIARRHEA: 0
EYE DISCHARGE: 0
COUGH: 0
NAUSEA: 1

## 2020-03-15 ASSESSMENT — PAIN DESCRIPTION - ORIENTATION: ORIENTATION: LEFT;RIGHT

## 2020-03-15 ASSESSMENT — PAIN SCALES - GENERAL
PAINLEVEL_OUTOF10: 7
PAINLEVEL_OUTOF10: 5
PAINLEVEL_OUTOF10: 3
PAINLEVEL_OUTOF10: 3
PAINLEVEL_OUTOF10: 6

## 2020-03-15 ASSESSMENT — PAIN DESCRIPTION - LOCATION: LOCATION: BACK

## 2020-03-15 ASSESSMENT — PAIN DESCRIPTION - ONSET: ONSET: ON-GOING

## 2020-03-15 NOTE — DISCHARGE SUMMARY
Discharge Summary - AGAINST MEDICAL ADVICE      Jessica Owen  :  1990  MRN:  [id number]    Admit date:  3/14/2020  Discharge date:      Admitting Physician:  Bj Ortega MD    Advance Directive: Full Code    Consults: hematology/oncology    Primary Care Physician:  Jostin Chiu DO    Discharge Diagnoses:  Principal Problem:    Intractable pain  Active Problems:    Cancer of testis, seminoma, right ([address]    Neutropenia ([address]    Pancytopenia due to chemotherapy ([address]    Asthma    Hypertension  Resolved Problems:    * No resolved hospital problems. *      Significant Diagnostic Studies:   Xr Chest Standard (2 Vw)    Result Date: 3/14/2020  XR CHEST (2 VW) 3/14/2020 8:15 AM HISTORY: Chest pain, fatigue COMPARISON: None. FINDINGS: No lung consolidation. No pleural effusion or pneumothorax. The cardiomediastinal silhouette and pulmonary vascularity are within normal limits. The osseous structures and surrounding soft tissues demonstrate no acute abnormality. 1. No radiographic evidence of acute cardiopulmonary process. No visualized infiltrate. Signed by Dr Minesh Clemente on 3/14/2020 10:11 AM    Ct Abdomen Pelvis W Iv Contrast Additional Contrast? None    Result Date: 3/14/2020  CT ABDOMEN PELVIS W IV CONTRAST 3/14/2020 5:45 AM HISTORY: Abdominal pain, flank pain, history of testicular cancer with metastatic disease COMPARISON: CT scan dated 2/3/2020. DLP: 1671 mGy cm TECHNIQUE: Following the uneventful administration of intravenous iodinated contrast, helical CT tomographic images of the abdomen and pelvis were acquired. Coronal reformatted images were also provided for review. Automated exposure control was also utilized to decrease patient radiation dose. FINDINGS: The lung bases and base of the heart are unremarkable. LIVER: No focal liver lesion. The hepatic vasculature is patent. BILIARY SYSTEM: The gallbladder is unremarkable. No intrahepatic or extrahepatic ductal dilatation.  PANCREAS: No focal pancreatic lesion. SPLEEN: Unremarkable. KIDNEYS AND ADRENALS: Adrenal glands are unremarkable. There is a 2 mm nonobstructing left upper pole renal stone. No hydronephrosis. Ureters are unremarkable. RETROPERITONEUM: Reidentified aortocaval adenopathy just below the level of the renal veins, measuring 1.7 cm short axis (series 2-image 43). This is much decreased from the earlier comparison exam, having measured up to 3.2 cm short axis on the prior CT. No new or increasing adenopathy. GI TRACT: The stomach is nondistended. Small bowel is nondilated. Moderate colonic stool. No acute inflammatory change identified along the bowel. Appendix is unremarkable. OTHER: There is no mesenteric mass, lymphadenopathy or fluid collection. The abdominopelvic vasculature is patent. The osseous structures and soft tissues demonstrate no worrisome lesions. Bilateral L4 spondylolysis without spondylolisthesis. No osteolytic or osteoblastic lesion identified. PELVIS: Evidence of right orchiectomy. No pelvic mass or adenopathy. Urinary bladder is unremarkable. 1. No acute process in the abdomen or pelvis. 2. Prior right orchiectomy. Decreasing retroperitoneal/aortocaval adenopathy. No new or worsening adenopathy identified. 3. 2 mm nonobstructing left renal stone. No obstructing stones identified. 4. Bilateral L4 spondylolysis without spondylolisthesis. Signed by Dr Yusra Elias on 3/14/2020 8:17 AM      Pertinent Labs:   CBC:   Recent Labs     03/14/20  0647 03/15/20  0615   WBC 2.1* 5.6   HGB 12.6* 11.0*   PLT 87* 52*     BMP:    Recent Labs     03/14/20  0647 03/15/20  0615    137   K 3.7 3.8    104   CO2 22 24   BUN 9 5*   CREATININE 0.7 0.6   GLUCOSE 115* 85     INR:   Recent Labs     03/15/20  0615   INR 1.11     ABGs:No results for input(s): PH, PO2, PCO2, HCO3, BE, O2SAT in the last 72 hours.   Lactic Acid:  Recent Labs     03/14/20  0852   LACTA 1.5       Procedures: None  Hospital Course: 51-year-old male with a history of testicular cancer status post orchiectomy receiving chemotherapy with oncology presenting for generalized body aches and abdominal pain with CT abdomen/pelvis generally unremarkable on admission. Oncology consulted from the emergency department. Patient was found to have neutropenia on admission which on CBC this morning has improved and he is no longer neutropenic but did have a fever overnight. Patient is receiving cefepime empirically and currently awaiting cultures. Generalized body aches and abdominal pain thought to be secondary to Neulasta which can cause similar reactions in some patients -this is greatly improved since yesterday. Was notified by nursing staff that the patient wishes to leave [address] as he misses his wife (current no visitor policy in the midst of coronavirus threat). Patient is alert and oriented x3 and has made his decision that he would like to leave the hospital [address] Nursing staff has removed his IVs and [address] paperwork has been signed. Physical Exam:  Vitals: /71   Pulse 106   Temp 98.7 °F (37.1 °C) (Temporal)   Resp 14   Ht 5' 11\" (1.803 m)   Wt 260 lb (117.9 kg)   SpO2 94%   BMI 36.26 kg/m²   24HR INTAKE/OUTPUT:      Intake/Output Summary (Last 24 hours) at 3/15/2020 1544  Last data filed at 3/15/2020 0116  Gross per 24 hour   Intake 360 ml   Output --   Net 360 ml     General appearance: AAOx3, NAD  Head: NC/AT  Eyes: conjunctivae/corneas clear. PERRL, EOM's intact.    Ears: normal external ears  Neck: Supple  Lungs: clear to auscultation bilaterally, no rales or wheezes   Heart: regular rate and rhythm, S1, S2 normal, no murmur   Abdomen: soft, non-tender; non-distended normal bowel sounds  Extremities:Pedal edema none  Skin: Warm  Neurologic: AAOx3, CN II-XII intact, 5/5 muscle strength in all extremities, gross sensory function intact  Psychiatric:  Alert and oriented, thought content appropriate, normal insight, mood appropriate    Discharge Medications/Discharge Instructions/Disposition:   No medications or instructions have been provided to the patient as he has elected to leave the hospital [address] Time spent on discharge 35 minutes.     Signed:  Angelita Duque MD 3/15/2020 3:44 PM

## 2020-03-15 NOTE — PROGRESS NOTES
Progress Note    Patient name: Karla Rose  Patient : 1990  MR #[id number]  Room: 424    Subjective: bony discomfort persists, though improved. Temp yesterday, 100.9. HISTORY OF PRESENT ILLNESS:  Geraldo Giron is a pleasant 66-year-old  gentleman with a diagnosis of stage IIB ([id number]) pure seminoma. Geraldo Giron received cycle #2 of chemotherapy between 3/2/2020 and 3/6/2020 followed by Neulasta.      Mr. Shelbie Perkins was admitted through the emergency room with new onset of extreme intractable body aches and pains as well as marked neutropenia for management and observation. Oncology consultation requested.     TUMOR HISTORY: Right orchiectomy for stage IIB ([id number]) pure seminoma 2020  Mr. Karla Rose was first seen by Dr. Silvia Galindo on 2020Tammycarlos Casey presented to the ER department with complaints of back pain on 2020.  A CT of the abdomen revealed a retroperitoneal mass.  Further examination also revealed a right testicular mass.  Urology was consulted and performed a right orchiectomy on 2020 consistent with pure seminoma. · 2/3/2020- CT of the abdomen showed 5 cm retroperitoneal lymph node mass within the upper abdomen in the aortocaval location.  No liver metastasis. · 2/3/2020-CT of the chest showed no evidence of intrathoracic metastatic disease. · 2020-right orchiectomy by Dr. Kai Massey at [facility] consistent with pure seminoma.  Tumor measuring 2.5 cm and confined to the testis.  Spermatic cord margin negative.  Final pathologic staging [id number], S0 stage IIB  · 2020-recommended 4 cycles of etoposide 100 mg/m2 days 1-5 and cisplatin 20mg/m2 days 1-5 × 4 cycles q. 21 days  · 2/10/2020 LDH-229, AFP- 2.4, Beta HCG- <1  · - complicated neutropenia with oral infection.  Recommend to continue with amoxicillin.  Will add Neupogen.  We will also add Neulasta to cycle #2.    · Cycle #2 of chemotherapy delivered between 3/2/2020 and 3/6/2020 followed by Labs     03/14/20  0647 03/15/20  0615   GLUCOSE 115* 85   BUN 9 5*   CREATININE 0.7 0.6   CO2 22 24   CALCIUM 9.1 7.9*   ALKPHOS 114 78   AST 23 22   ALT 45* 32     Hepatic:   Recent Labs     03/14/20  0647 03/15/20  0615   AST 23 22   ALT 45* 32   BILITOT 0.5 0.4   ALKPHOS 114 78     Troponin:   Recent Labs     03/14/20  1115   TROPONINI <0.01     BNP: No results for input(s): BNP in the last 72 hours. INR:   Recent Labs     03/15/20  0615   INR 1.11     ABG: No results for input(s): PHART, YQU3PXI, PO2ART, QZT3XPQ, Y1BJCBOZ, BEART in the last 72 hours. 30 Day lookback of cultures:    Blood Culture Recent: No results for input(s): BC in the last 720 hours. Gram Stain Recent: No results for input(s): LABGRAM in the last 720 hours. Resp Culture Recent: No results for input(s): CULTRESP in the last 720 hours. Body Fluid Recent : No results for input(s): BFCX in the last 720 hours. MRSA Recent : No results for input(s): [facility] in the last 720 hours. Urine Culture Recent : No results for input(s): LABURIN in the last 720 hours. Organism Recent : No results for input(s): ORG in the last 720 hours. ASSESSMENT/PLAN:    #1  Right orchiectomy for stage IIB ([id number]) pure seminoma 2/4/2020. Cycle #2 of Cisplatin/-16 chemotherapy between 3/2/2020 and 3/6/2020 followed by Neulasta    CT scan of the abdomen and pelvis on 3/14/2020 showed dramatic reduction in size of the initial 3.7 cm retroperitoneal lymph node mass-effect down to 1.7 cm when compared to 2/3/2020.     #2  Neutropenic fever  WBC improved, 5.6, ANC pending  Temp 100.9 on 3/14/2020, 7 pm  CXR 3/14/2020 negative  BC x2 on 3/14/2020 pending  UC pending  Receiving cefepime, per attending    #3  Pancytopenia r/t chemotherapy  CBC 3/15/2020:  · WBC 5.6 - improved  · Hgb 11.0, MCV 88.2  · Platelets 69,529 - decreased     #4  C/o deep bone discomfort. Analgesia PRN      Suspect generalized discomfort r/t Neulasta. Bilateral L4 spondylolysis. Add Mag/Phos to labs this am.  Await diff on CBC    I have seen, examined and reviewed patient medication list, appropriate labs and imaging studies. Relevant medical records and other physician notes have been reviewed. I answered all questions to the best of my knowledge and the patient's satisfaction. AMBAR Shaver  03/15/20  8:16 AM    Physician's attestation and contribution:  I, Dr Mayra Morales, personally and independently performed an evaluation on  [facility]        I have reviewed relevant medical information/data to include but not limited to the medication list, relevant appropriate lab work and imaging when applicable. I reviewed other physician's notes, ancillary services and nurses assessments. I have reviewed the above documentation completed by Yudy VILLALTA   Please see my additional addended and/or modified contributions to the history of present illness, physical examination, and assessment/medical decision-making and plan that reflects my findings and impressions. I discussed essential elements of the care plan with Yudy VILLALTA and the patient. I have encouraged and answered all the questions raised to the patient's understanding and satisfaction. I concur with the above stated. Subjective-he had a temperature elevation to 100.9 last night, afebrile this morning    Objective-  examination is stable, he is in no acute distress. Lungs are clear heart is regular abdomen is soft and benign    Assessment/plan: Cultures were taken IV antibiotics were instituted  CBC is improved as follows:  CBC 3/15/2020:  · WBC 5.6 - improved  · Hgb 11.0, MCV 88.2  · Platelets 17,947 - decreased     Continue monitoring on IV antibiotics. Neulasta associated pain is improved.       Electronically signed by Mayra Morales MD on 3/15/20 at 11:13 AM CDT

## 2020-03-16 ENCOUNTER — HOSPITAL ENCOUNTER (OUTPATIENT)
Dept: INFUSION THERAPY | Age: 30
Discharge: HOME | End: 2020-03-16
Payer: COMMERCIAL

## 2020-03-16 DIAGNOSIS — C62.11 CANCER OF DESCENDED RIGHT TESTIS (HCC): ICD-10-CM

## 2020-03-16 LAB — URINE CULTURE, ROUTINE: NORMAL

## 2020-03-16 PROCEDURE — 85025 COMPLETE CBC W/AUTO DIFF WBC: CPT

## 2020-03-19 LAB
BLOOD CULTURE, ROUTINE: NORMAL
CULTURE, BLOOD 2: NORMAL

## 2020-03-20 NOTE — PROGRESS NOTES
also add Neulasta to cycle #2. · 3/14/2020-CT abdomen pelvis showed  No acute process in the abdomen or pelvis. Prior right orchiectomy. Decreasing retroperitoneal/aortocaval adenopathy. No new or worsening adenopathy identified. 2 mm nonobstructing left renal stone. No obstructing stones identified. Bilateral L4 spondylolysis without spondylolisthesis.              Past Medical History:    Past Medical History:   Diagnosis Date    Back pain     Cancer ([address]     testicular    Hypertension     Lumbar stress fracture    History of asthma  Tobacco abuse x12 years    Past Surgical History:      · Right orchiectomy 2/4/2020    Social History:    Social History     Socioeconomic History    Marital status:      Spouse name: Not on file    Number of children: Not on file    Years of education: Not on file    Highest education level: Not on file   Occupational History    Not on file   Social Needs    Financial resource strain: Not on file    Food insecurity     Worry: Not on file     Inability: Not on file   [employer] needs     Medical: Not on file     Non-medical: Not on file   Tobacco Use    Smoking status: Former Smoker     Packs/day: 0.50    Smokeless tobacco: Never Used   Substance and Sexual Activity    Alcohol use: No    Drug use: No    Sexual activity: Yes     Partners: Female   Lifestyle    Physical activity     Days per week: Not on file     Minutes per session: Not on file    Stress: Not on file   Relationships    Social connections     Talks on phone: Not on file     Gets together: Not on file     Attends Congregation service: Not on file     Active member of club or organization: Not on file     Attends meetings of clubs or organizations: Not on file     Relationship status: Not on file    Intimate partner violence     Fear of current or ex partner: Not on file     Emotionally abused: Not on file     Physically abused: Not on file     Forced sexual activity: Not on file   Other Topics Concern    Not on file   Social History Narrative    Not on file   Smoker x12 years    Family History:     No family history of cancer    Current Hospital Medications:    No current facility-administered medications for this visit. Facility-Administered Medications Ordered in Other Visits: 0.9 % sodium chloride infusion, 20 mL/hr, Intravenous, Once    Allergies: Allergies   Allergen Reactions    Nuts [Peanut-Containing Drug Products]     Other      peanuts         Subjective   REVIEW OF SYSTEMS:   CONSTITUTIONAL: no fever, no night sweats, no fatigue;  HEENT: no blurring of vision, no double vision, no hearing difficulty, no tinnitus, no ulceration, no dysplasia, no epistaxis;  LUNGS: no cough, no hemoptysis, no wheeze,  no shortness of breath;  CARDIOVASCULAR: no palpitation, no chest pain, no shortness of breath;  GI: no abdominal pain, no nausea, no vomiting, no diarrhea, no constipation;  RISHI: no dysuria, no hematuria, no frequency or urgency, no nephrolithiasis;  MUSCULOSKELETAL: no joint pain, no swelling, no stiffness;   ENDOCRINE: no polyuria, no polydipsia, no cold or heat intolerance;  HEMATOLOGY: no easy bruising or bleeding, no history of clotting disorder;  DERMATOLOGY: no skin rash, no eczema, no pruritus;  PSYCHIATRY: no depression, no anxiety, no panic attacks, no suicidal ideation, no homicidal ideation;  NEUROLOGY: no syncope, no seizures, no numbness or tingling of hands, no numbness or tingling of feet, no paresis;    Objective   /80   Pulse 106   Ht 5' 11\" (1.803 m)   Wt 260 lb 12.8 oz (118.3 kg)   SpO2 98%   BMI 36.37 kg/m²     PHYSICAL EXAM:  CONSTITUTIONAL: Alert, appropriate, no acute distress  EYES: Non icteric, EOM intact, pupils equal round   ENT: Mucus membranes moist, no oral pharyngeal lesions, external inspection of ears and nose are normal,   NECK: Supple, no masses.   No palpable thyroid mass  CHEST/LUNGS: CTA bilaterally, normal respiratory effort CARDIOVASCULAR: RRR, no murmurs. No lower extremity edema  ABDOMEN: soft non-tender, active bowel sounds, no HSM. No palpable masses  EXTREMITIES: warm, full ROM in all 4 extremities, no focal weakness. SKIN: warm, dry with no rashes or lesions  LYMPH: No cervical, clavicular, axillary, or inguinal lymphadenopathy  NEUROLOGIC: follows commands, non focal   PSYCH: mood and affect appropriate. Alert and oriented to time, place, person      LABORATORY RESULTS REVIEWED BY ME:  ZBS:2/13/8787  WBC-30.43  HGB-12.5  PLT-300,000  Neut-        RADIOLOGY STUDIES REVIEWED BY ME:  Xr Chest Standard (2 Vw)    Result Date: 3/14/2020  1. No radiographic evidence of acute cardiopulmonary process. No visualized infiltrate. Signed by Dr Юлия King on 3/14/2020 10:11 AM    Ct Abdomen Pelvis W Iv Contrast     Result Date: 3/14/2020  1. No acute process in the abdomen or pelvis. 2. Prior right orchiectomy. Decreasing retroperitoneal/aortocaval adenopathy. No new or worsening adenopathy identified. 3. 2 mm nonobstructing left renal stone. No obstructing stones identified. 4. Bilateral L4 spondylolysis without spondylolisthesis. Signed by Dr Юлия King on 3/14/2020 8:17 AM      ASSESSMENT:    #Complicated neutropenia/tooth infection  Resolved. #Pure seminoma stage IIB [id number]  The patient was counseled today about diagnosis, staging, prognosis, diagnostic tests, medications, side effects and disease management. The method of counseling included verbal explanation. The patient verbalized understanding. Essentially, stage IIB Pure Seminoma. I reviewed with the patient the NCCN guidelines. We discussed BEP x3 versus EP x4. The patient has a history of asthma and has also been a smoker for 12 years. Therefore, I have offered EP x4 to avoid lung toxicity of bleomycin. Proceed cycle #3 today. #Risk of infertility-discussed with the patient at length about the risk of infertility.   He is not interested at this time in

## 2020-03-23 ENCOUNTER — OFFICE VISIT (OUTPATIENT)
Dept: HEMATOLOGY | Age: 30
End: 2020-03-23
Payer: COMMERCIAL

## 2020-03-23 ENCOUNTER — HOSPITAL ENCOUNTER (OUTPATIENT)
Dept: INFUSION THERAPY | Age: 30
Setting detail: INFUSION SERIES
Discharge: HOME | End: 2020-03-23
Payer: COMMERCIAL

## 2020-03-23 ENCOUNTER — HOSPITAL ENCOUNTER (OUTPATIENT)
Dept: INFUSION THERAPY | Age: 30
Discharge: HOME | End: 2020-03-23
Payer: COMMERCIAL

## 2020-03-23 VITALS
HEIGHT: 71 IN | HEART RATE: 106 BPM | WEIGHT: 260.8 LBS | SYSTOLIC BLOOD PRESSURE: 136 MMHG | DIASTOLIC BLOOD PRESSURE: 80 MMHG | OXYGEN SATURATION: 98 % | BODY MASS INDEX: 36.51 KG/M2

## 2020-03-23 VITALS
RESPIRATION RATE: 17 BRPM | TEMPERATURE: 97.2 F | SYSTOLIC BLOOD PRESSURE: 112 MMHG | DIASTOLIC BLOOD PRESSURE: 70 MMHG | WEIGHT: 260 LBS | BODY MASS INDEX: 36.26 KG/M2 | OXYGEN SATURATION: 98 % | HEART RATE: 86 BPM

## 2020-03-23 DIAGNOSIS — N50.89 MASS OF RIGHT TESTICLE: Primary | ICD-10-CM

## 2020-03-23 DIAGNOSIS — C62.11 CANCER OF DESCENDED RIGHT TESTIS (HCC): ICD-10-CM

## 2020-03-23 PROCEDURE — 99212 OFFICE O/P EST SF 10 MIN: CPT

## 2020-03-23 PROCEDURE — 2580000003 HC RX 258: Performed by: INTERNAL MEDICINE

## 2020-03-23 PROCEDURE — 96361 HYDRATE IV INFUSION ADD-ON: CPT

## 2020-03-23 PROCEDURE — 96413 CHEMO IV INFUSION 1 HR: CPT

## 2020-03-23 PROCEDURE — 85025 COMPLETE CBC W/AUTO DIFF WBC: CPT

## 2020-03-23 PROCEDURE — 99214 OFFICE O/P EST MOD 30 MIN: CPT | Performed by: INTERNAL MEDICINE

## 2020-03-23 PROCEDURE — 96366 THER/PROPH/DIAG IV INF ADDON: CPT

## 2020-03-23 PROCEDURE — 96417 CHEMO IV INFUS EACH ADDL SEQ: CPT

## 2020-03-23 PROCEDURE — 6360000002 HC RX W HCPCS: Performed by: INTERNAL MEDICINE

## 2020-03-23 PROCEDURE — 96376 TX/PRO/DX INJ SAME DRUG ADON: CPT

## 2020-03-23 RX ORDER — SODIUM CHLORIDE 9 MG/ML
20 INJECTION, SOLUTION INTRAVENOUS ONCE
Status: CANCELLED | OUTPATIENT
Start: 2020-03-25

## 2020-03-23 RX ORDER — HEPARIN SODIUM (PORCINE) LOCK FLUSH IV SOLN 100 UNIT/ML 100 UNIT/ML
500 SOLUTION INTRAVENOUS PRN
Status: CANCELLED | OUTPATIENT
Start: 2020-03-25

## 2020-03-23 RX ORDER — PALONOSETRON 0.05 MG/ML
0.25 INJECTION, SOLUTION INTRAVENOUS ONCE
Status: CANCELLED | OUTPATIENT
Start: 2020-03-26

## 2020-03-23 RX ORDER — FUROSEMIDE 10 MG/ML
20 INJECTION INTRAMUSCULAR; INTRAVENOUS ONCE
Status: CANCELLED
Start: 2020-03-24

## 2020-03-23 RX ORDER — SODIUM CHLORIDE 0.9 % (FLUSH) 0.9 %
5 SYRINGE (ML) INJECTION PRN
Status: CANCELLED | OUTPATIENT
Start: 2020-03-26

## 2020-03-23 RX ORDER — EPINEPHRINE 1 MG/ML
0.3 INJECTION, SOLUTION, CONCENTRATE INTRAVENOUS PRN
Status: CANCELLED | OUTPATIENT
Start: 2020-03-27

## 2020-03-23 RX ORDER — ONDANSETRON 4 MG/1
4 TABLET, ORALLY DISINTEGRATING ORAL EVERY 8 HOURS PRN
Qty: 60 TABLET | Refills: 0 | Status: SHIPPED | OUTPATIENT
Start: 2020-03-23 | End: 2020-08-13

## 2020-03-23 RX ORDER — FUROSEMIDE 10 MG/ML
20 INJECTION INTRAMUSCULAR; INTRAVENOUS ONCE
Status: CANCELLED
Start: 2020-03-26

## 2020-03-23 RX ORDER — EPINEPHRINE 1 MG/ML
0.3 INJECTION, SOLUTION, CONCENTRATE INTRAVENOUS PRN
Status: CANCELLED | OUTPATIENT
Start: 2020-03-25

## 2020-03-23 RX ORDER — SODIUM CHLORIDE 9 MG/ML
20 INJECTION, SOLUTION INTRAVENOUS ONCE
Status: COMPLETED | OUTPATIENT
Start: 2020-03-23 | End: 2020-03-24

## 2020-03-23 RX ORDER — SODIUM CHLORIDE 0.9 % (FLUSH) 0.9 %
5 SYRINGE (ML) INJECTION PRN
Status: CANCELLED | OUTPATIENT
Start: 2020-03-25

## 2020-03-23 RX ORDER — METHYLPREDNISOLONE SODIUM SUCCINATE 125 MG/2ML
125 INJECTION, POWDER, LYOPHILIZED, FOR SOLUTION INTRAMUSCULAR; INTRAVENOUS ONCE
Status: CANCELLED | OUTPATIENT
Start: 2020-03-27

## 2020-03-23 RX ORDER — METHYLPREDNISOLONE SODIUM SUCCINATE 125 MG/2ML
125 INJECTION, POWDER, LYOPHILIZED, FOR SOLUTION INTRAMUSCULAR; INTRAVENOUS ONCE
Status: CANCELLED | OUTPATIENT
Start: 2020-03-23

## 2020-03-23 RX ORDER — SODIUM CHLORIDE 9 MG/ML
INJECTION, SOLUTION INTRAVENOUS CONTINUOUS
Status: CANCELLED | OUTPATIENT
Start: 2020-03-26

## 2020-03-23 RX ORDER — FUROSEMIDE 10 MG/ML
20 INJECTION INTRAMUSCULAR; INTRAVENOUS ONCE
Status: COMPLETED | OUTPATIENT
Start: 2020-03-23 | End: 2020-03-23

## 2020-03-23 RX ORDER — SODIUM CHLORIDE 9 MG/ML
20 INJECTION, SOLUTION INTRAVENOUS ONCE
Status: CANCELLED | OUTPATIENT
Start: 2020-03-27

## 2020-03-23 RX ORDER — HEPARIN SODIUM (PORCINE) LOCK FLUSH IV SOLN 100 UNIT/ML 100 UNIT/ML
500 SOLUTION INTRAVENOUS PRN
Status: CANCELLED | OUTPATIENT
Start: 2020-03-27

## 2020-03-23 RX ORDER — EPINEPHRINE 1 MG/ML
0.3 INJECTION, SOLUTION, CONCENTRATE INTRAVENOUS PRN
Status: CANCELLED | OUTPATIENT
Start: 2020-03-23

## 2020-03-23 RX ORDER — PALONOSETRON 0.05 MG/ML
0.25 INJECTION, SOLUTION INTRAVENOUS ONCE
Status: COMPLETED | OUTPATIENT
Start: 2020-03-23 | End: 2020-03-23

## 2020-03-23 RX ORDER — SODIUM CHLORIDE 0.9 % (FLUSH) 0.9 %
5 SYRINGE (ML) INJECTION PRN
Status: CANCELLED | OUTPATIENT
Start: 2020-03-23

## 2020-03-23 RX ORDER — SODIUM CHLORIDE 9 MG/ML
INJECTION, SOLUTION INTRAVENOUS CONTINUOUS
Status: CANCELLED | OUTPATIENT
Start: 2020-03-23

## 2020-03-23 RX ORDER — FUROSEMIDE 10 MG/ML
20 INJECTION INTRAMUSCULAR; INTRAVENOUS ONCE
Status: CANCELLED
Start: 2020-03-27

## 2020-03-23 RX ORDER — DEXAMETHASONE SODIUM PHOSPHATE 10 MG/ML
10 INJECTION, SOLUTION INTRAMUSCULAR; INTRAVENOUS ONCE
Status: COMPLETED | OUTPATIENT
Start: 2020-03-23 | End: 2020-03-23

## 2020-03-23 RX ORDER — SODIUM CHLORIDE 9 MG/ML
INJECTION, SOLUTION INTRAVENOUS CONTINUOUS
Status: CANCELLED | OUTPATIENT
Start: 2020-03-25

## 2020-03-23 RX ORDER — FUROSEMIDE 10 MG/ML
20 INJECTION INTRAMUSCULAR; INTRAVENOUS ONCE
Status: CANCELLED
Start: 2020-03-25

## 2020-03-23 RX ORDER — SODIUM CHLORIDE 0.9 % (FLUSH) 0.9 %
10 SYRINGE (ML) INJECTION PRN
Status: CANCELLED | OUTPATIENT
Start: 2020-03-26

## 2020-03-23 RX ORDER — EPINEPHRINE 1 MG/ML
0.3 INJECTION, SOLUTION, CONCENTRATE INTRAVENOUS PRN
Status: CANCELLED | OUTPATIENT
Start: 2020-03-26

## 2020-03-23 RX ORDER — EPINEPHRINE 1 MG/ML
0.3 INJECTION, SOLUTION, CONCENTRATE INTRAVENOUS PRN
Status: CANCELLED | OUTPATIENT
Start: 2020-03-24

## 2020-03-23 RX ORDER — SODIUM CHLORIDE 0.9 % (FLUSH) 0.9 %
10 SYRINGE (ML) INJECTION PRN
Status: CANCELLED | OUTPATIENT
Start: 2020-03-23

## 2020-03-23 RX ORDER — SODIUM CHLORIDE 9 MG/ML
INJECTION, SOLUTION INTRAVENOUS CONTINUOUS
Status: CANCELLED | OUTPATIENT
Start: 2020-03-24

## 2020-03-23 RX ORDER — DIPHENHYDRAMINE HYDROCHLORIDE 50 MG/ML
50 INJECTION INTRAMUSCULAR; INTRAVENOUS ONCE
Status: CANCELLED | OUTPATIENT
Start: 2020-03-26

## 2020-03-23 RX ORDER — DIPHENHYDRAMINE HYDROCHLORIDE 50 MG/ML
50 INJECTION INTRAMUSCULAR; INTRAVENOUS ONCE
Status: CANCELLED | OUTPATIENT
Start: 2020-03-23

## 2020-03-23 RX ORDER — HEPARIN SODIUM (PORCINE) LOCK FLUSH IV SOLN 100 UNIT/ML 100 UNIT/ML
500 SOLUTION INTRAVENOUS PRN
Status: CANCELLED | OUTPATIENT
Start: 2020-03-26

## 2020-03-23 RX ORDER — SODIUM CHLORIDE 9 MG/ML
20 INJECTION, SOLUTION INTRAVENOUS ONCE
Status: CANCELLED | OUTPATIENT
Start: 2020-03-23

## 2020-03-23 RX ORDER — SODIUM CHLORIDE 0.9 % (FLUSH) 0.9 %
5 SYRINGE (ML) INJECTION PRN
Status: CANCELLED | OUTPATIENT
Start: 2020-03-24

## 2020-03-23 RX ORDER — SODIUM CHLORIDE 0.9 % (FLUSH) 0.9 %
10 SYRINGE (ML) INJECTION PRN
Status: CANCELLED | OUTPATIENT
Start: 2020-03-24

## 2020-03-23 RX ORDER — HEPARIN SODIUM (PORCINE) LOCK FLUSH IV SOLN 100 UNIT/ML 100 UNIT/ML
500 SOLUTION INTRAVENOUS PRN
Status: CANCELLED | OUTPATIENT
Start: 2020-03-24

## 2020-03-23 RX ORDER — SODIUM CHLORIDE 0.9 % (FLUSH) 0.9 %
10 SYRINGE (ML) INJECTION PRN
Status: CANCELLED | OUTPATIENT
Start: 2020-03-27

## 2020-03-23 RX ORDER — SODIUM CHLORIDE 9 MG/ML
20 INJECTION, SOLUTION INTRAVENOUS ONCE
Status: CANCELLED | OUTPATIENT
Start: 2020-03-26

## 2020-03-23 RX ORDER — METHYLPREDNISOLONE SODIUM SUCCINATE 125 MG/2ML
125 INJECTION, POWDER, LYOPHILIZED, FOR SOLUTION INTRAMUSCULAR; INTRAVENOUS ONCE
Status: CANCELLED | OUTPATIENT
Start: 2020-03-24

## 2020-03-23 RX ORDER — SODIUM CHLORIDE 9 MG/ML
20 INJECTION, SOLUTION INTRAVENOUS ONCE
Status: CANCELLED | OUTPATIENT
Start: 2020-03-24

## 2020-03-23 RX ORDER — METHYLPREDNISOLONE SODIUM SUCCINATE 125 MG/2ML
125 INJECTION, POWDER, LYOPHILIZED, FOR SOLUTION INTRAMUSCULAR; INTRAVENOUS ONCE
Status: CANCELLED | OUTPATIENT
Start: 2020-03-26

## 2020-03-23 RX ORDER — HEPARIN SODIUM (PORCINE) LOCK FLUSH IV SOLN 100 UNIT/ML 100 UNIT/ML
500 SOLUTION INTRAVENOUS PRN
Status: CANCELLED | OUTPATIENT
Start: 2020-03-23

## 2020-03-23 RX ORDER — METHYLPREDNISOLONE SODIUM SUCCINATE 125 MG/2ML
125 INJECTION, POWDER, LYOPHILIZED, FOR SOLUTION INTRAMUSCULAR; INTRAVENOUS ONCE
Status: CANCELLED | OUTPATIENT
Start: 2020-03-25

## 2020-03-23 RX ORDER — DIPHENHYDRAMINE HYDROCHLORIDE 50 MG/ML
50 INJECTION INTRAMUSCULAR; INTRAVENOUS ONCE
Status: CANCELLED | OUTPATIENT
Start: 2020-03-27

## 2020-03-23 RX ORDER — SODIUM CHLORIDE 0.9 % (FLUSH) 0.9 %
5 SYRINGE (ML) INJECTION PRN
Status: CANCELLED | OUTPATIENT
Start: 2020-03-27

## 2020-03-23 RX ORDER — SODIUM CHLORIDE 9 MG/ML
INJECTION, SOLUTION INTRAVENOUS CONTINUOUS
Status: CANCELLED | OUTPATIENT
Start: 2020-03-27

## 2020-03-23 RX ORDER — DIPHENHYDRAMINE HYDROCHLORIDE 50 MG/ML
50 INJECTION INTRAMUSCULAR; INTRAVENOUS ONCE
Status: CANCELLED | OUTPATIENT
Start: 2020-03-25

## 2020-03-23 RX ORDER — SODIUM CHLORIDE 0.9 % (FLUSH) 0.9 %
10 SYRINGE (ML) INJECTION PRN
Status: CANCELLED | OUTPATIENT
Start: 2020-03-25

## 2020-03-23 RX ORDER — DIPHENHYDRAMINE HYDROCHLORIDE 50 MG/ML
50 INJECTION INTRAMUSCULAR; INTRAVENOUS ONCE
Status: CANCELLED | OUTPATIENT
Start: 2020-03-24

## 2020-03-23 RX ORDER — FUROSEMIDE 10 MG/ML
20 INJECTION INTRAMUSCULAR; INTRAVENOUS ONCE
Status: CANCELLED
Start: 2020-03-23

## 2020-03-23 RX ORDER — PALONOSETRON 0.05 MG/ML
0.25 INJECTION, SOLUTION INTRAVENOUS ONCE
Status: CANCELLED | OUTPATIENT
Start: 2020-03-23

## 2020-03-23 RX ADMIN — SODIUM CHLORIDE 20 ML/HR: 9 INJECTION, SOLUTION INTRAVENOUS at 12:21

## 2020-03-23 RX ADMIN — FUROSEMIDE 20 MG: 10 INJECTION, SOLUTION INTRAMUSCULAR; INTRAVENOUS at 12:31

## 2020-03-23 RX ADMIN — DEXAMETHASONE SODIUM PHOSPHATE 10 MG: 10 INJECTION, SOLUTION INTRAMUSCULAR; INTRAVENOUS at 12:21

## 2020-03-23 RX ADMIN — CISPLATIN: 1 INJECTION INTRAVENOUS at 12:39

## 2020-03-23 RX ADMIN — ETOPOSIDE 246 MG: 20 INJECTION, SOLUTION, CONCENTRATE INTRAVENOUS at 13:53

## 2020-03-23 RX ADMIN — SODIUM CHLORIDE 150 MG: 900 INJECTION, SOLUTION INTRAVENOUS at 11:27

## 2020-03-23 RX ADMIN — PALONOSETRON 0.25 MG: 0.05 INJECTION, SOLUTION INTRAVENOUS at 12:34

## 2020-03-24 ENCOUNTER — HOSPITAL ENCOUNTER (OUTPATIENT)
Dept: INFUSION THERAPY | Age: 30
Setting detail: INFUSION SERIES
Discharge: HOME | End: 2020-03-24
Payer: COMMERCIAL

## 2020-03-24 VITALS
RESPIRATION RATE: 18 BRPM | HEART RATE: 92 BPM | TEMPERATURE: 97.8 F | OXYGEN SATURATION: 95 % | SYSTOLIC BLOOD PRESSURE: 123 MMHG | DIASTOLIC BLOOD PRESSURE: 76 MMHG

## 2020-03-24 DIAGNOSIS — N50.89 MASS OF RIGHT TESTICLE: Primary | ICD-10-CM

## 2020-03-24 DIAGNOSIS — C62.11 CANCER OF DESCENDED RIGHT TESTIS (HCC): ICD-10-CM

## 2020-03-24 PROCEDURE — 6360000002 HC RX W HCPCS: Performed by: INTERNAL MEDICINE

## 2020-03-24 PROCEDURE — 96417 CHEMO IV INFUS EACH ADDL SEQ: CPT

## 2020-03-24 PROCEDURE — 2580000003 HC RX 258: Performed by: INTERNAL MEDICINE

## 2020-03-24 PROCEDURE — 96360 HYDRATION IV INFUSION INIT: CPT

## 2020-03-24 PROCEDURE — 96413 CHEMO IV INFUSION 1 HR: CPT

## 2020-03-24 PROCEDURE — 96361 HYDRATE IV INFUSION ADD-ON: CPT

## 2020-03-24 PROCEDURE — 2500000003 HC RX 250 WO HCPCS: Performed by: INTERNAL MEDICINE

## 2020-03-24 RX ORDER — SODIUM CHLORIDE 0.9 % (FLUSH) 0.9 %
10 SYRINGE (ML) INJECTION PRN
Status: DISCONTINUED | OUTPATIENT
Start: 2020-03-24 | End: 2020-03-25 | Stop reason: HOSPADM

## 2020-03-24 RX ORDER — FUROSEMIDE 10 MG/ML
20 INJECTION INTRAMUSCULAR; INTRAVENOUS ONCE
Status: COMPLETED | OUTPATIENT
Start: 2020-03-24 | End: 2020-03-24

## 2020-03-24 RX ORDER — SODIUM CHLORIDE 9 MG/ML
20 INJECTION, SOLUTION INTRAVENOUS ONCE
Status: COMPLETED | OUTPATIENT
Start: 2020-03-24 | End: 2020-03-25

## 2020-03-24 RX ORDER — DEXAMETHASONE SODIUM PHOSPHATE 10 MG/ML
10 INJECTION, SOLUTION INTRAMUSCULAR; INTRAVENOUS ONCE
Status: COMPLETED | OUTPATIENT
Start: 2020-03-24 | End: 2020-03-24

## 2020-03-24 RX ADMIN — DEXAMETHASONE SODIUM PHOSPHATE 10 MG: 10 INJECTION, SOLUTION INTRAMUSCULAR; INTRAVENOUS at 08:55

## 2020-03-24 RX ADMIN — FUROSEMIDE 20 MG: 10 INJECTION, SOLUTION INTRAMUSCULAR; INTRAVENOUS at 08:55

## 2020-03-24 RX ADMIN — CISPLATIN: 1 INJECTION INTRAVENOUS at 09:15

## 2020-03-24 RX ADMIN — ETOPOSIDE 246 MG: 20 INJECTION, SOLUTION, CONCENTRATE INTRAVENOUS at 10:41

## 2020-03-24 RX ADMIN — FAMOTIDINE 20 MG: 10 INJECTION, SOLUTION INTRAVENOUS at 08:55

## 2020-03-24 RX ADMIN — SODIUM CHLORIDE 20 ML/HR: 9 INJECTION, SOLUTION INTRAVENOUS at 08:56

## 2020-03-25 ENCOUNTER — HOSPITAL ENCOUNTER (OUTPATIENT)
Dept: INFUSION THERAPY | Age: 30
Setting detail: INFUSION SERIES
Discharge: HOME | End: 2020-03-25
Payer: COMMERCIAL

## 2020-03-25 VITALS
BODY MASS INDEX: 36.26 KG/M2 | HEART RATE: 71 BPM | WEIGHT: 260 LBS | OXYGEN SATURATION: 99 % | SYSTOLIC BLOOD PRESSURE: 122 MMHG | TEMPERATURE: 98.3 F | RESPIRATION RATE: 17 BRPM | DIASTOLIC BLOOD PRESSURE: 76 MMHG

## 2020-03-25 DIAGNOSIS — C62.11 CANCER OF DESCENDED RIGHT TESTIS (HCC): ICD-10-CM

## 2020-03-25 DIAGNOSIS — N50.89 MASS OF RIGHT TESTICLE: Primary | ICD-10-CM

## 2020-03-25 PROCEDURE — 2580000003 HC RX 258: Performed by: INTERNAL MEDICINE

## 2020-03-25 PROCEDURE — 96417 CHEMO IV INFUS EACH ADDL SEQ: CPT

## 2020-03-25 PROCEDURE — 6360000002 HC RX W HCPCS: Performed by: INTERNAL MEDICINE

## 2020-03-25 PROCEDURE — 96360 HYDRATION IV INFUSION INIT: CPT

## 2020-03-25 PROCEDURE — 96413 CHEMO IV INFUSION 1 HR: CPT

## 2020-03-25 PROCEDURE — 96361 HYDRATE IV INFUSION ADD-ON: CPT

## 2020-03-25 RX ORDER — FUROSEMIDE 10 MG/ML
20 INJECTION INTRAMUSCULAR; INTRAVENOUS ONCE
Status: COMPLETED | OUTPATIENT
Start: 2020-03-25 | End: 2020-03-25

## 2020-03-25 RX ORDER — DEXAMETHASONE SODIUM PHOSPHATE 10 MG/ML
10 INJECTION, SOLUTION INTRAMUSCULAR; INTRAVENOUS ONCE
Status: COMPLETED | OUTPATIENT
Start: 2020-03-25 | End: 2020-03-25

## 2020-03-25 RX ORDER — SODIUM CHLORIDE 9 MG/ML
20 INJECTION, SOLUTION INTRAVENOUS ONCE
Status: COMPLETED | OUTPATIENT
Start: 2020-03-25 | End: 2020-03-26

## 2020-03-25 RX ADMIN — CISPLATIN: 1 INJECTION INTRAVENOUS at 09:01

## 2020-03-25 RX ADMIN — ETOPOSIDE 246 MG: 20 INJECTION, SOLUTION, CONCENTRATE INTRAVENOUS at 10:06

## 2020-03-25 RX ADMIN — FUROSEMIDE 20 MG: 10 INJECTION, SOLUTION INTRAMUSCULAR; INTRAVENOUS at 08:32

## 2020-03-25 RX ADMIN — DEXAMETHASONE SODIUM PHOSPHATE 10 MG: 10 INJECTION, SOLUTION INTRAMUSCULAR; INTRAVENOUS at 08:33

## 2020-03-25 RX ADMIN — SODIUM CHLORIDE 20 ML/HR: 9 INJECTION, SOLUTION INTRAVENOUS at 08:33

## 2020-03-26 ENCOUNTER — HOSPITAL ENCOUNTER (OUTPATIENT)
Dept: INFUSION THERAPY | Age: 30
Setting detail: INFUSION SERIES
Discharge: HOME | End: 2020-03-26
Payer: COMMERCIAL

## 2020-03-26 VITALS
SYSTOLIC BLOOD PRESSURE: 118 MMHG | OXYGEN SATURATION: 100 % | TEMPERATURE: 96.9 F | RESPIRATION RATE: 18 BRPM | HEART RATE: 69 BPM | DIASTOLIC BLOOD PRESSURE: 77 MMHG

## 2020-03-26 DIAGNOSIS — N50.89 MASS OF RIGHT TESTICLE: Primary | ICD-10-CM

## 2020-03-26 DIAGNOSIS — C62.11 CANCER OF DESCENDED RIGHT TESTIS (HCC): ICD-10-CM

## 2020-03-26 PROCEDURE — 96361 HYDRATE IV INFUSION ADD-ON: CPT

## 2020-03-26 PROCEDURE — 96417 CHEMO IV INFUS EACH ADDL SEQ: CPT

## 2020-03-26 PROCEDURE — 96413 CHEMO IV INFUSION 1 HR: CPT

## 2020-03-26 PROCEDURE — 6360000002 HC RX W HCPCS: Performed by: INTERNAL MEDICINE

## 2020-03-26 PROCEDURE — 2580000003 HC RX 258: Performed by: INTERNAL MEDICINE

## 2020-03-26 PROCEDURE — 96375 TX/PRO/DX INJ NEW DRUG ADDON: CPT

## 2020-03-26 RX ORDER — SODIUM CHLORIDE 9 MG/ML
20 INJECTION, SOLUTION INTRAVENOUS ONCE
Status: COMPLETED | OUTPATIENT
Start: 2020-03-26 | End: 2020-03-26

## 2020-03-26 RX ORDER — PALONOSETRON 0.05 MG/ML
0.25 INJECTION, SOLUTION INTRAVENOUS ONCE
Status: COMPLETED | OUTPATIENT
Start: 2020-03-26 | End: 2020-03-26

## 2020-03-26 RX ORDER — FUROSEMIDE 10 MG/ML
20 INJECTION INTRAMUSCULAR; INTRAVENOUS ONCE
Status: COMPLETED | OUTPATIENT
Start: 2020-03-26 | End: 2020-03-26

## 2020-03-26 RX ORDER — DEXAMETHASONE SODIUM PHOSPHATE 10 MG/ML
10 INJECTION, SOLUTION INTRAMUSCULAR; INTRAVENOUS ONCE
Status: COMPLETED | OUTPATIENT
Start: 2020-03-26 | End: 2020-03-26

## 2020-03-26 RX ADMIN — DEXAMETHASONE SODIUM PHOSPHATE 10 MG: 10 INJECTION, SOLUTION INTRAMUSCULAR; INTRAVENOUS at 08:58

## 2020-03-26 RX ADMIN — PALONOSETRON HYDROCHLORIDE 0.25 MG: 0.25 INJECTION, SOLUTION INTRAVENOUS at 08:58

## 2020-03-26 RX ADMIN — FUROSEMIDE 20 MG: 10 INJECTION, SOLUTION INTRAMUSCULAR; INTRAVENOUS at 08:58

## 2020-03-26 RX ADMIN — SODIUM CHLORIDE 20 ML/HR: 9 INJECTION, SOLUTION INTRAVENOUS at 09:10

## 2020-03-26 RX ADMIN — ETOPOSIDE 246 MG: 20 INJECTION, SOLUTION, CONCENTRATE INTRAVENOUS at 11:52

## 2020-03-26 RX ADMIN — CISPLATIN 49 MG: 1 INJECTION INTRAVENOUS at 10:37

## 2020-03-27 ENCOUNTER — HOSPITAL ENCOUNTER (OUTPATIENT)
Dept: INFUSION THERAPY | Age: 30
Setting detail: INFUSION SERIES
Discharge: HOME | End: 2020-03-27
Payer: COMMERCIAL

## 2020-03-27 VITALS
SYSTOLIC BLOOD PRESSURE: 114 MMHG | OXYGEN SATURATION: 96 % | RESPIRATION RATE: 16 BRPM | DIASTOLIC BLOOD PRESSURE: 75 MMHG | TEMPERATURE: 97.4 F | HEART RATE: 77 BPM

## 2020-03-27 DIAGNOSIS — N50.89 MASS OF RIGHT TESTICLE: Primary | ICD-10-CM

## 2020-03-27 DIAGNOSIS — C62.11 CANCER OF DESCENDED RIGHT TESTIS (HCC): ICD-10-CM

## 2020-03-27 PROCEDURE — 2580000003 HC RX 258: Performed by: INTERNAL MEDICINE

## 2020-03-27 PROCEDURE — 6360000002 HC RX W HCPCS: Performed by: INTERNAL MEDICINE

## 2020-03-27 PROCEDURE — 96361 HYDRATE IV INFUSION ADD-ON: CPT

## 2020-03-27 PROCEDURE — 96417 CHEMO IV INFUS EACH ADDL SEQ: CPT

## 2020-03-27 PROCEDURE — 96413 CHEMO IV INFUSION 1 HR: CPT

## 2020-03-27 PROCEDURE — 96360 HYDRATION IV INFUSION INIT: CPT

## 2020-03-27 RX ORDER — SODIUM CHLORIDE 9 MG/ML
20 INJECTION, SOLUTION INTRAVENOUS ONCE
Status: COMPLETED | OUTPATIENT
Start: 2020-03-27 | End: 2020-03-27

## 2020-03-27 RX ORDER — DEXAMETHASONE SODIUM PHOSPHATE 10 MG/ML
10 INJECTION, SOLUTION INTRAMUSCULAR; INTRAVENOUS ONCE
Status: COMPLETED | OUTPATIENT
Start: 2020-03-27 | End: 2020-03-27

## 2020-03-27 RX ORDER — PROMETHAZINE HYDROCHLORIDE 25 MG/ML
12.5 INJECTION, SOLUTION INTRAMUSCULAR; INTRAVENOUS EVERY 4 HOURS PRN
Status: DISCONTINUED | OUTPATIENT
Start: 2020-03-27 | End: 2020-03-29 | Stop reason: HOSPADM

## 2020-03-27 RX ORDER — ONDANSETRON 2 MG/ML
8 INJECTION INTRAMUSCULAR; INTRAVENOUS EVERY 6 HOURS PRN
Status: DISCONTINUED | OUTPATIENT
Start: 2020-03-27 | End: 2020-03-29 | Stop reason: HOSPADM

## 2020-03-27 RX ORDER — FUROSEMIDE 10 MG/ML
20 INJECTION INTRAMUSCULAR; INTRAVENOUS ONCE
Status: COMPLETED | OUTPATIENT
Start: 2020-03-27 | End: 2020-03-27

## 2020-03-27 RX ADMIN — PEGFILGRASTIM 6 MG: KIT SUBCUTANEOUS at 12:14

## 2020-03-27 RX ADMIN — FUROSEMIDE 20 MG: 10 INJECTION, SOLUTION INTRAMUSCULAR; INTRAVENOUS at 09:56

## 2020-03-27 RX ADMIN — ONDANSETRON 8 MG: 2 INJECTION INTRAMUSCULAR; INTRAVENOUS at 08:45

## 2020-03-27 RX ADMIN — DEXAMETHASONE SODIUM PHOSPHATE 10 MG: 10 INJECTION, SOLUTION INTRAMUSCULAR; INTRAVENOUS at 08:45

## 2020-03-27 RX ADMIN — PROMETHAZINE HYDROCHLORIDE 12.5 MG: 25 INJECTION INTRAMUSCULAR; INTRAVENOUS at 10:30

## 2020-03-27 RX ADMIN — ETOPOSIDE 246 MG: 20 INJECTION, SOLUTION, CONCENTRATE INTRAVENOUS at 10:44

## 2020-03-27 RX ADMIN — CISPLATIN: 1 INJECTION INTRAVENOUS at 09:19

## 2020-03-27 RX ADMIN — SODIUM CHLORIDE 20 ML/HR: 9 INJECTION, SOLUTION INTRAVENOUS at 08:45

## 2020-03-30 ENCOUNTER — HOSPITAL ENCOUNTER (OUTPATIENT)
Dept: INFUSION THERAPY | Age: 30
Discharge: HOME | End: 2020-03-30
Payer: COMMERCIAL

## 2020-03-30 DIAGNOSIS — C62.11 CANCER OF DESCENDED RIGHT TESTIS (HCC): ICD-10-CM

## 2020-03-30 PROCEDURE — 85025 COMPLETE CBC W/AUTO DIFF WBC: CPT

## 2020-04-03 ENCOUNTER — TELEPHONE (OUTPATIENT)
Dept: INFUSION THERAPY | Age: 30
End: 2020-04-03

## 2020-04-03 NOTE — TELEPHONE ENCOUNTER
Patient called clinic regarding body pain due to neulasta. Encouraged patient to take his Norco as prescribed and to apply warm compress to painful areas. Patient verbalized understanding.

## 2020-04-06 ENCOUNTER — HOSPITAL ENCOUNTER (OUTPATIENT)
Dept: INFUSION THERAPY | Age: 30
Discharge: HOME | End: 2020-04-06
Payer: COMMERCIAL

## 2020-04-06 PROCEDURE — 85025 COMPLETE CBC W/AUTO DIFF WBC: CPT

## 2020-04-08 ENCOUNTER — HOSPITAL ENCOUNTER (OUTPATIENT)
Dept: NON INVASIVE DIAGNOSTICS | Age: 30
Discharge: HOME | End: 2020-04-08
Payer: COMMERCIAL

## 2020-04-08 ENCOUNTER — HOSPITAL ENCOUNTER (OUTPATIENT)
Dept: INFUSION THERAPY | Age: 30
Discharge: HOME | End: 2020-04-08
Payer: COMMERCIAL

## 2020-04-08 ENCOUNTER — OFFICE VISIT (OUTPATIENT)
Dept: HEMATOLOGY | Age: 30
End: 2020-04-08
Payer: COMMERCIAL

## 2020-04-08 VITALS
BODY MASS INDEX: 36.44 KG/M2 | SYSTOLIC BLOOD PRESSURE: 142 MMHG | HEIGHT: 71 IN | TEMPERATURE: 98.4 F | HEART RATE: 107 BPM | DIASTOLIC BLOOD PRESSURE: 90 MMHG | WEIGHT: 260.3 LBS | OXYGEN SATURATION: 95 %

## 2020-04-08 DIAGNOSIS — C62.11 CANCER OF DESCENDED RIGHT TESTIS (HCC): ICD-10-CM

## 2020-04-08 PROCEDURE — 36556 INSERT NON-TUNNEL CV CATH: CPT | Performed by: INTERNAL MEDICINE

## 2020-04-08 PROCEDURE — 99211 OFF/OP EST MAY X REQ PHY/QHP: CPT

## 2020-04-08 PROCEDURE — 99214 OFFICE O/P EST MOD 30 MIN: CPT | Performed by: INTERNAL MEDICINE

## 2020-04-08 PROCEDURE — 93971 EXTREMITY STUDY: CPT

## 2020-04-08 PROCEDURE — 85025 COMPLETE CBC W/AUTO DIFF WBC: CPT

## 2020-04-08 RX ORDER — LIDOCAINE HYDROCHLORIDE 10 MG/ML
5 INJECTION, SOLUTION EPIDURAL; INFILTRATION; INTRACAUDAL; PERINEURAL ONCE
Status: DISCONTINUED | OUTPATIENT
Start: 2020-04-08 | End: 2020-04-16 | Stop reason: HOSPADM

## 2020-04-08 RX ORDER — SULFAMETHOXAZOLE AND TRIMETHOPRIM 800; 160 MG/1; MG/1
1 TABLET ORAL 2 TIMES DAILY
Qty: 14 TABLET | Refills: 0 | Status: SHIPPED | OUTPATIENT
Start: 2020-04-08 | End: 2020-04-15

## 2020-04-08 RX ORDER — SODIUM CHLORIDE 0.9 % (FLUSH) 0.9 %
10 SYRINGE (ML) INJECTION EVERY 12 HOURS SCHEDULED
Status: DISCONTINUED | OUTPATIENT
Start: 2020-04-08 | End: 2020-04-16 | Stop reason: HOSPADM

## 2020-04-08 RX ORDER — SODIUM CHLORIDE 0.9 % (FLUSH) 0.9 %
10 SYRINGE (ML) INJECTION PRN
Status: DISCONTINUED | OUTPATIENT
Start: 2020-04-08 | End: 2020-04-16 | Stop reason: HOSPADM

## 2020-04-08 NOTE — PROGRESS NOTES
retroperitoneal/aortocaval adenopathy. No new or worsening adenopathy identified. 2 mm nonobstructing left renal stone. No obstructing stones identified. Bilateral L4 spondylolysis without spondylolisthesis.              Past Medical History:    Past Medical History:   Diagnosis Date    Back pain     Cancer ([address]     testicular    Hypertension     Lumbar stress fracture    History of asthma  Tobacco abuse x12 years    Past Surgical History:      · Right orchiectomy 2/4/2020    Social History:    Social History     Socioeconomic History    Marital status:      Spouse name: Not on file    Number of children: Not on file    Years of education: Not on file    Highest education level: Not on file   Occupational History    Not on file   Social Needs    Financial resource strain: Not on file    Food insecurity     Worry: Not on file     Inability: Not on file    Transportation needs     Medical: Not on file     Non-medical: Not on file   Tobacco Use    Smoking status: Former Smoker     Packs/day: 0.50    Smokeless tobacco: Never Used   Substance and Sexual Activity    Alcohol use: No    Drug use: No    Sexual activity: Yes     Partners: Female   Lifestyle    Physical activity     Days per week: Not on file     Minutes per session: Not on file    Stress: Not on file   Relationships    Social connections     Talks on phone: Not on file     Gets together: Not on file     Attends [employer] service: Not on file     Active member of club or organization: Not on file     Attends meetings of clubs or organizations: Not on file     Relationship status: Not on file    Intimate partner violence     Fear of current or ex partner: Not on file     Emotionally abused: Not on file     Physically abused: Not on file     Forced sexual activity: Not on file   Other Topics Concern    Not on file   Social History Narrative    Not on file   Smoker x12 years    Family History:     No family history of weakness. SKIN: warm, dry with no rashes or lesions  LYMPH: No cervical, clavicular, axillary, or inguinal lymphadenopathy  NEUROLOGIC: follows commands, non focal   PSYCH: mood and affect appropriate. Alert and oriented to time, place, person      LABORATORY RESULTS REVIEWED BY ME:  CBC:4/8/2020  WBC-22.10  HGB-10.5  PLT-53,000  Neut-17.03      RADIOLOGY STUDIES REVIEWED BY ME:  Xr Chest Standard (2 Vw)    Result Date: 3/14/2020  1. No radiographic evidence of acute cardiopulmonary process. No visualized infiltrate. Ct Abdomen Pelvis W Iv Contrast     Result Date: 3/14/2020  1. No acute process in the abdomen or pelvis. 2. Prior right orchiectomy. Decreasing retroperitoneal/aortocaval adenopathy. No new or worsening adenopathy identified. 3. 2 mm nonobstructing left renal stone. No obstructing stones identified. 4. Bilateral L4 spondylolysis without spondylolisthesis. ASSESSMENT:    #SVT RUE- Eliquis x 6 weeks    #Celulitis- Batcrim  DS x 7 days     #Pure seminoma stage IIB [id number]  The patient was counseled today about diagnosis, staging, prognosis, diagnostic tests, medications, side effects and disease management. The method of counseling included verbal explanation. The patient verbalized understanding. Essentially, stage IIB Pure Seminoma. I reviewed with the patient the NCCN guidelines. We discussed BEP x3 versus EP x4. The patient has a history of asthma and has also been a smoker for 12 years. Therefore, I have offered EP x4 to avoid lung toxicity of bleomycin. #Risk of infertility-discussed with the patient at length about the risk of infertility. He is not interested at this time in seeking sperm banking. #Elevated total bilirubin- resolved. Total bilirubin 0.4    #Tobacco abuse- strongly encouraged to quit. #Bone pain secondary to Neulasta-- continue current opioid medication and NSAIDs. Advised to take Claritin.     PLAN:  Return for cycle #4 on Monday  Continue Neulasta

## 2020-04-13 ENCOUNTER — HOSPITAL ENCOUNTER (OUTPATIENT)
Dept: INFUSION THERAPY | Age: 30
Setting detail: INFUSION SERIES
Discharge: HOME | End: 2020-04-13
Payer: COMMERCIAL

## 2020-04-13 ENCOUNTER — HOSPITAL ENCOUNTER (OUTPATIENT)
Dept: INFUSION THERAPY | Age: 30
Setting detail: INFUSION SERIES
End: 2020-04-13
Payer: COMMERCIAL

## 2020-04-13 VITALS
WEIGHT: 260 LBS | BODY MASS INDEX: 36.26 KG/M2 | DIASTOLIC BLOOD PRESSURE: 92 MMHG | OXYGEN SATURATION: 99 % | TEMPERATURE: 96.9 F | SYSTOLIC BLOOD PRESSURE: 134 MMHG | HEART RATE: 85 BPM | RESPIRATION RATE: 16 BRPM

## 2020-04-13 DIAGNOSIS — N50.89 MASS OF RIGHT TESTICLE: Primary | ICD-10-CM

## 2020-04-13 DIAGNOSIS — C62.11 CANCER OF DESCENDED RIGHT TESTIS (HCC): ICD-10-CM

## 2020-04-13 LAB
ANISOCYTOSIS: ABNORMAL
BANDED NEUTROPHILS RELATIVE PERCENT: 9 % (ref 0–5)
BASOPHILS ABSOLUTE: 0 K/UL (ref 0–0.2)
BASOPHILS RELATIVE PERCENT: 0 % (ref 0–1)
EOSINOPHILS ABSOLUTE: 0 K/UL (ref 0–0.6)
EOSINOPHILS RELATIVE PERCENT: 0 % (ref 0–5)
HCT VFR BLD CALC: 30.3 % (ref 42–52)
HEMOGLOBIN: 9.8 G/DL (ref 14–18)
HYPOCHROMIA: ABNORMAL
IMMATURE GRANULOCYTES #: 2.9 K/UL
LYMPHOCYTES ABSOLUTE: 3 K/UL (ref 1.1–4.5)
LYMPHOCYTES RELATIVE PERCENT: 16 % (ref 20–40)
MACROCYTES: ABNORMAL
MCH RBC QN AUTO: 30.9 PG (ref 27–31)
MCHC RBC AUTO-ENTMCNC: 32.3 G/DL (ref 33–37)
MCV RBC AUTO: 95.6 FL (ref 80–94)
MONOCYTES ABSOLUTE: 0.4 K/UL (ref 0–0.9)
MONOCYTES RELATIVE PERCENT: 2 % (ref 0–10)
NEUTROPHILS ABSOLUTE: 15.4 K/UL (ref 1.5–7.5)
NEUTROPHILS RELATIVE PERCENT: 73 % (ref 50–65)
PDW BLD-RTO: 17.3 % (ref 11.5–14.5)
PLATELET # BLD: 178 K/UL (ref 130–400)
PLATELET SLIDE REVIEW: ADEQUATE
PMV BLD AUTO: 10.2 FL (ref 9.4–12.4)
RBC # BLD: 3.17 M/UL (ref 4.7–6.1)
WBC # BLD: 18.8 K/UL (ref 4.8–10.8)

## 2020-04-13 PROCEDURE — 76937 US GUIDE VASCULAR ACCESS: CPT

## 2020-04-13 PROCEDURE — 96413 CHEMO IV INFUSION 1 HR: CPT

## 2020-04-13 PROCEDURE — 6360000002 HC RX W HCPCS: Performed by: INTERNAL MEDICINE

## 2020-04-13 PROCEDURE — 36569 INSJ PICC 5 YR+ W/O IMAGING: CPT

## 2020-04-13 PROCEDURE — 96375 TX/PRO/DX INJ NEW DRUG ADDON: CPT

## 2020-04-13 PROCEDURE — 96361 HYDRATE IV INFUSION ADD-ON: CPT

## 2020-04-13 PROCEDURE — C1751 CATH, INF, PER/CENT/MIDLINE: HCPCS

## 2020-04-13 PROCEDURE — 85025 COMPLETE CBC W/AUTO DIFF WBC: CPT

## 2020-04-13 PROCEDURE — 2580000003 HC RX 258: Performed by: INTERNAL MEDICINE

## 2020-04-13 PROCEDURE — 96417 CHEMO IV INFUS EACH ADDL SEQ: CPT

## 2020-04-13 PROCEDURE — 96366 THER/PROPH/DIAG IV INF ADDON: CPT

## 2020-04-13 RX ORDER — PALONOSETRON 0.05 MG/ML
0.25 INJECTION, SOLUTION INTRAVENOUS ONCE
Status: CANCELLED | OUTPATIENT
Start: 2020-04-16

## 2020-04-13 RX ORDER — SODIUM CHLORIDE 0.9 % (FLUSH) 0.9 %
10 SYRINGE (ML) INJECTION PRN
Status: CANCELLED | OUTPATIENT
Start: 2020-04-14

## 2020-04-13 RX ORDER — DIPHENHYDRAMINE HYDROCHLORIDE 50 MG/ML
50 INJECTION INTRAMUSCULAR; INTRAVENOUS ONCE
Status: CANCELLED | OUTPATIENT
Start: 2020-04-15

## 2020-04-13 RX ORDER — SODIUM CHLORIDE 9 MG/ML
INJECTION, SOLUTION INTRAVENOUS CONTINUOUS
Status: CANCELLED | OUTPATIENT
Start: 2020-04-14

## 2020-04-13 RX ORDER — PALONOSETRON 0.05 MG/ML
0.25 INJECTION, SOLUTION INTRAVENOUS ONCE
Status: COMPLETED | OUTPATIENT
Start: 2020-04-13 | End: 2020-04-13

## 2020-04-13 RX ORDER — HEPARIN SODIUM (PORCINE) LOCK FLUSH IV SOLN 100 UNIT/ML 100 UNIT/ML
500 SOLUTION INTRAVENOUS PRN
Status: CANCELLED | OUTPATIENT
Start: 2020-04-16

## 2020-04-13 RX ORDER — SODIUM CHLORIDE 0.9 % (FLUSH) 0.9 %
10 SYRINGE (ML) INJECTION PRN
Status: CANCELLED | OUTPATIENT
Start: 2020-04-15

## 2020-04-13 RX ORDER — SODIUM CHLORIDE 0.9 % (FLUSH) 0.9 %
10 SYRINGE (ML) INJECTION PRN
Status: DISCONTINUED | OUTPATIENT
Start: 2020-04-13 | End: 2020-04-14 | Stop reason: HOSPADM

## 2020-04-13 RX ORDER — SODIUM CHLORIDE 9 MG/ML
20 INJECTION, SOLUTION INTRAVENOUS ONCE
Status: CANCELLED | OUTPATIENT
Start: 2020-04-16

## 2020-04-13 RX ORDER — HEPARIN SODIUM (PORCINE) LOCK FLUSH IV SOLN 100 UNIT/ML 100 UNIT/ML
500 SOLUTION INTRAVENOUS PRN
Status: CANCELLED | OUTPATIENT
Start: 2020-04-17

## 2020-04-13 RX ORDER — DIPHENHYDRAMINE HYDROCHLORIDE 50 MG/ML
50 INJECTION INTRAMUSCULAR; INTRAVENOUS ONCE
Status: CANCELLED | OUTPATIENT
Start: 2020-04-13

## 2020-04-13 RX ORDER — METHYLPREDNISOLONE SODIUM SUCCINATE 125 MG/2ML
125 INJECTION, POWDER, LYOPHILIZED, FOR SOLUTION INTRAMUSCULAR; INTRAVENOUS ONCE
Status: CANCELLED | OUTPATIENT
Start: 2020-04-13

## 2020-04-13 RX ORDER — SODIUM CHLORIDE 0.9 % (FLUSH) 0.9 %
5 SYRINGE (ML) INJECTION PRN
Status: CANCELLED | OUTPATIENT
Start: 2020-04-15

## 2020-04-13 RX ORDER — METHYLPREDNISOLONE SODIUM SUCCINATE 125 MG/2ML
125 INJECTION, POWDER, LYOPHILIZED, FOR SOLUTION INTRAMUSCULAR; INTRAVENOUS ONCE
Status: CANCELLED | OUTPATIENT
Start: 2020-04-17

## 2020-04-13 RX ORDER — SODIUM CHLORIDE 0.9 % (FLUSH) 0.9 %
10 SYRINGE (ML) INJECTION PRN
Status: CANCELLED | OUTPATIENT
Start: 2020-04-16

## 2020-04-13 RX ORDER — SODIUM CHLORIDE 0.9 % (FLUSH) 0.9 %
10 SYRINGE (ML) INJECTION PRN
Status: CANCELLED | OUTPATIENT
Start: 2020-04-13

## 2020-04-13 RX ORDER — FUROSEMIDE 10 MG/ML
20 INJECTION INTRAMUSCULAR; INTRAVENOUS ONCE
Status: CANCELLED
Start: 2020-04-17

## 2020-04-13 RX ORDER — HEPARIN SODIUM (PORCINE) LOCK FLUSH IV SOLN 100 UNIT/ML 100 UNIT/ML
500 SOLUTION INTRAVENOUS PRN
Status: CANCELLED | OUTPATIENT
Start: 2020-04-13

## 2020-04-13 RX ORDER — SODIUM CHLORIDE 0.9 % (FLUSH) 0.9 %
5 SYRINGE (ML) INJECTION PRN
Status: CANCELLED | OUTPATIENT
Start: 2020-04-16

## 2020-04-13 RX ORDER — SODIUM CHLORIDE 9 MG/ML
INJECTION, SOLUTION INTRAVENOUS CONTINUOUS
Status: CANCELLED | OUTPATIENT
Start: 2020-04-16

## 2020-04-13 RX ORDER — EPINEPHRINE 1 MG/ML
0.3 INJECTION, SOLUTION, CONCENTRATE INTRAVENOUS PRN
Status: CANCELLED | OUTPATIENT
Start: 2020-04-14

## 2020-04-13 RX ORDER — FUROSEMIDE 10 MG/ML
20 INJECTION INTRAMUSCULAR; INTRAVENOUS ONCE
Status: CANCELLED
Start: 2020-04-15

## 2020-04-13 RX ORDER — SODIUM CHLORIDE 9 MG/ML
INJECTION, SOLUTION INTRAVENOUS CONTINUOUS
Status: CANCELLED | OUTPATIENT
Start: 2020-04-17

## 2020-04-13 RX ORDER — HEPARIN SODIUM (PORCINE) LOCK FLUSH IV SOLN 100 UNIT/ML 100 UNIT/ML
500 SOLUTION INTRAVENOUS PRN
Status: CANCELLED | OUTPATIENT
Start: 2020-04-14

## 2020-04-13 RX ORDER — SODIUM CHLORIDE 9 MG/ML
20 INJECTION, SOLUTION INTRAVENOUS ONCE
Status: CANCELLED | OUTPATIENT
Start: 2020-04-13

## 2020-04-13 RX ORDER — EPINEPHRINE 1 MG/ML
0.3 INJECTION, SOLUTION, CONCENTRATE INTRAVENOUS PRN
Status: CANCELLED | OUTPATIENT
Start: 2020-04-15

## 2020-04-13 RX ORDER — EPINEPHRINE 1 MG/ML
0.3 INJECTION, SOLUTION, CONCENTRATE INTRAVENOUS PRN
Status: CANCELLED | OUTPATIENT
Start: 2020-04-16

## 2020-04-13 RX ORDER — DIPHENHYDRAMINE HYDROCHLORIDE 50 MG/ML
50 INJECTION INTRAMUSCULAR; INTRAVENOUS ONCE
Status: CANCELLED | OUTPATIENT
Start: 2020-04-16

## 2020-04-13 RX ORDER — SODIUM CHLORIDE 9 MG/ML
INJECTION, SOLUTION INTRAVENOUS CONTINUOUS
Status: CANCELLED | OUTPATIENT
Start: 2020-04-13

## 2020-04-13 RX ORDER — EPINEPHRINE 1 MG/ML
0.3 INJECTION, SOLUTION, CONCENTRATE INTRAVENOUS PRN
Status: CANCELLED | OUTPATIENT
Start: 2020-04-13

## 2020-04-13 RX ORDER — DIPHENHYDRAMINE HYDROCHLORIDE 50 MG/ML
50 INJECTION INTRAMUSCULAR; INTRAVENOUS ONCE
Status: CANCELLED | OUTPATIENT
Start: 2020-04-17

## 2020-04-13 RX ORDER — METHYLPREDNISOLONE SODIUM SUCCINATE 125 MG/2ML
125 INJECTION, POWDER, LYOPHILIZED, FOR SOLUTION INTRAMUSCULAR; INTRAVENOUS ONCE
Status: CANCELLED | OUTPATIENT
Start: 2020-04-14

## 2020-04-13 RX ORDER — DEXAMETHASONE SODIUM PHOSPHATE 10 MG/ML
10 INJECTION, SOLUTION INTRAMUSCULAR; INTRAVENOUS ONCE
Status: COMPLETED | OUTPATIENT
Start: 2020-04-13 | End: 2020-04-13

## 2020-04-13 RX ORDER — FUROSEMIDE 10 MG/ML
20 INJECTION INTRAMUSCULAR; INTRAVENOUS ONCE
Status: CANCELLED
Start: 2020-04-16

## 2020-04-13 RX ORDER — SODIUM CHLORIDE 9 MG/ML
INJECTION, SOLUTION INTRAVENOUS CONTINUOUS
Status: CANCELLED | OUTPATIENT
Start: 2020-04-15

## 2020-04-13 RX ORDER — SODIUM CHLORIDE 0.9 % (FLUSH) 0.9 %
5 SYRINGE (ML) INJECTION PRN
Status: CANCELLED | OUTPATIENT
Start: 2020-04-13

## 2020-04-13 RX ORDER — SODIUM CHLORIDE 9 MG/ML
20 INJECTION, SOLUTION INTRAVENOUS ONCE
Status: CANCELLED | OUTPATIENT
Start: 2020-04-17

## 2020-04-13 RX ORDER — PALONOSETRON 0.05 MG/ML
0.25 INJECTION, SOLUTION INTRAVENOUS ONCE
Status: CANCELLED | OUTPATIENT
Start: 2020-04-13

## 2020-04-13 RX ORDER — SODIUM CHLORIDE 0.9 % (FLUSH) 0.9 %
10 SYRINGE (ML) INJECTION PRN
Status: CANCELLED | OUTPATIENT
Start: 2020-04-17

## 2020-04-13 RX ORDER — FUROSEMIDE 10 MG/ML
20 INJECTION INTRAMUSCULAR; INTRAVENOUS ONCE
Status: COMPLETED | OUTPATIENT
Start: 2020-04-13 | End: 2020-04-13

## 2020-04-13 RX ORDER — DIPHENHYDRAMINE HYDROCHLORIDE 50 MG/ML
50 INJECTION INTRAMUSCULAR; INTRAVENOUS ONCE
Status: CANCELLED | OUTPATIENT
Start: 2020-04-14

## 2020-04-13 RX ORDER — SODIUM CHLORIDE 0.9 % (FLUSH) 0.9 %
5 SYRINGE (ML) INJECTION PRN
Status: CANCELLED | OUTPATIENT
Start: 2020-04-14

## 2020-04-13 RX ORDER — SODIUM CHLORIDE 9 MG/ML
20 INJECTION, SOLUTION INTRAVENOUS ONCE
Status: CANCELLED | OUTPATIENT
Start: 2020-04-14

## 2020-04-13 RX ORDER — FUROSEMIDE 10 MG/ML
20 INJECTION INTRAMUSCULAR; INTRAVENOUS ONCE
Status: CANCELLED
Start: 2020-04-14

## 2020-04-13 RX ORDER — METHYLPREDNISOLONE SODIUM SUCCINATE 125 MG/2ML
125 INJECTION, POWDER, LYOPHILIZED, FOR SOLUTION INTRAMUSCULAR; INTRAVENOUS ONCE
Status: CANCELLED | OUTPATIENT
Start: 2020-04-15

## 2020-04-13 RX ORDER — SODIUM CHLORIDE 9 MG/ML
20 INJECTION, SOLUTION INTRAVENOUS ONCE
Status: COMPLETED | OUTPATIENT
Start: 2020-04-13 | End: 2020-04-14

## 2020-04-13 RX ORDER — SODIUM CHLORIDE 9 MG/ML
20 INJECTION, SOLUTION INTRAVENOUS ONCE
Status: CANCELLED | OUTPATIENT
Start: 2020-04-15

## 2020-04-13 RX ORDER — HEPARIN SODIUM (PORCINE) LOCK FLUSH IV SOLN 100 UNIT/ML 100 UNIT/ML
500 SOLUTION INTRAVENOUS PRN
Status: CANCELLED | OUTPATIENT
Start: 2020-04-15

## 2020-04-13 RX ORDER — EPINEPHRINE 1 MG/ML
0.3 INJECTION, SOLUTION, CONCENTRATE INTRAVENOUS PRN
Status: CANCELLED | OUTPATIENT
Start: 2020-04-17

## 2020-04-13 RX ORDER — FUROSEMIDE 10 MG/ML
20 INJECTION INTRAMUSCULAR; INTRAVENOUS ONCE
Status: CANCELLED
Start: 2020-04-13

## 2020-04-13 RX ORDER — SODIUM CHLORIDE 0.9 % (FLUSH) 0.9 %
5 SYRINGE (ML) INJECTION PRN
Status: CANCELLED | OUTPATIENT
Start: 2020-04-17

## 2020-04-13 RX ORDER — METHYLPREDNISOLONE SODIUM SUCCINATE 125 MG/2ML
125 INJECTION, POWDER, LYOPHILIZED, FOR SOLUTION INTRAMUSCULAR; INTRAVENOUS ONCE
Status: CANCELLED | OUTPATIENT
Start: 2020-04-16

## 2020-04-13 RX ADMIN — CISPLATIN: 1 INJECTION INTRAVENOUS at 12:06

## 2020-04-13 RX ADMIN — SODIUM CHLORIDE 20 ML/HR: 9 INJECTION, SOLUTION INTRAVENOUS at 10:38

## 2020-04-13 RX ADMIN — FUROSEMIDE 20 MG: 10 INJECTION, SOLUTION INTRAMUSCULAR; INTRAVENOUS at 12:00

## 2020-04-13 RX ADMIN — SODIUM CHLORIDE 150 MG: 900 INJECTION, SOLUTION INTRAVENOUS at 10:50

## 2020-04-13 RX ADMIN — ETOPOSIDE 246 MG: 20 INJECTION, SOLUTION, CONCENTRATE INTRAVENOUS at 13:29

## 2020-04-13 RX ADMIN — PALONOSETRON HYDROCHLORIDE 0.25 MG: 0.25 INJECTION, SOLUTION INTRAVENOUS at 11:25

## 2020-04-13 RX ADMIN — DEXAMETHASONE SODIUM PHOSPHATE 10 MG: 10 INJECTION, SOLUTION INTRAMUSCULAR; INTRAVENOUS at 11:25

## 2020-04-13 NOTE — PROCEDURES
Midline Insertion Procedure Note    Procedure: Insertion of #4 FR/18G Midline    Indications:  Chemo x 5 days    Procedure Details   Informed consent was obtained for the procedure, including local anesthetic. Risks and benefits were discussed. #4 FR/18G Midline inserted to the R brachial vein per hospital protocol. Blood return:  yes    Findings:  Catheter inserted to 12 cm, with 0 cm exposed. Catheter was flushed with 10 cc NS. Patient did tolerate procedure well. Recommendations:  CXR ordered to verify placement. Midline Brochure given to patient with teaching instruction.

## 2020-04-14 ENCOUNTER — HOSPITAL ENCOUNTER (OUTPATIENT)
Dept: INFUSION THERAPY | Age: 30
Setting detail: INFUSION SERIES
Discharge: HOME | End: 2020-04-14
Payer: COMMERCIAL

## 2020-04-14 VITALS
OXYGEN SATURATION: 99 % | DIASTOLIC BLOOD PRESSURE: 89 MMHG | RESPIRATION RATE: 16 BRPM | SYSTOLIC BLOOD PRESSURE: 135 MMHG | HEART RATE: 83 BPM | TEMPERATURE: 97 F

## 2020-04-14 DIAGNOSIS — C62.11 CANCER OF DESCENDED RIGHT TESTIS (HCC): ICD-10-CM

## 2020-04-14 DIAGNOSIS — N50.89 MASS OF RIGHT TESTICLE: Primary | ICD-10-CM

## 2020-04-14 PROCEDURE — 96413 CHEMO IV INFUSION 1 HR: CPT

## 2020-04-14 PROCEDURE — 6360000002 HC RX W HCPCS: Performed by: INTERNAL MEDICINE

## 2020-04-14 PROCEDURE — 2500000003 HC RX 250 WO HCPCS: Performed by: INTERNAL MEDICINE

## 2020-04-14 PROCEDURE — 96417 CHEMO IV INFUS EACH ADDL SEQ: CPT

## 2020-04-14 PROCEDURE — 2580000003 HC RX 258: Performed by: INTERNAL MEDICINE

## 2020-04-14 RX ORDER — HEPARIN SODIUM (PORCINE) LOCK FLUSH IV SOLN 100 UNIT/ML 100 UNIT/ML
500 SOLUTION INTRAVENOUS PRN
Status: DISCONTINUED | OUTPATIENT
Start: 2020-04-14 | End: 2020-04-15 | Stop reason: HOSPADM

## 2020-04-14 RX ORDER — DEXAMETHASONE SODIUM PHOSPHATE 10 MG/ML
10 INJECTION, SOLUTION INTRAMUSCULAR; INTRAVENOUS ONCE
Status: COMPLETED | OUTPATIENT
Start: 2020-04-14 | End: 2020-04-14

## 2020-04-14 RX ORDER — SODIUM CHLORIDE 9 MG/ML
20 INJECTION, SOLUTION INTRAVENOUS ONCE
Status: COMPLETED | OUTPATIENT
Start: 2020-04-14 | End: 2020-04-15

## 2020-04-14 RX ORDER — FUROSEMIDE 10 MG/ML
20 INJECTION INTRAMUSCULAR; INTRAVENOUS ONCE
Status: COMPLETED | OUTPATIENT
Start: 2020-04-14 | End: 2020-04-14

## 2020-04-14 RX ADMIN — ETOPOSIDE 246 MG: 20 INJECTION, SOLUTION, CONCENTRATE INTRAVENOUS at 10:44

## 2020-04-14 RX ADMIN — SODIUM CHLORIDE 20 ML/HR: 9 INJECTION, SOLUTION INTRAVENOUS at 09:45

## 2020-04-14 RX ADMIN — DEXAMETHASONE SODIUM PHOSPHATE 10 MG: 10 INJECTION, SOLUTION INTRAMUSCULAR; INTRAVENOUS at 09:13

## 2020-04-14 RX ADMIN — CISPLATIN: 1 INJECTION INTRAVENOUS at 09:24

## 2020-04-14 RX ADMIN — FAMOTIDINE 20 MG: 10 INJECTION, SOLUTION INTRAVENOUS at 09:13

## 2020-04-14 RX ADMIN — FUROSEMIDE 20 MG: 10 INJECTION, SOLUTION INTRAMUSCULAR; INTRAVENOUS at 09:21

## 2020-04-15 ENCOUNTER — HOSPITAL ENCOUNTER (OUTPATIENT)
Dept: INFUSION THERAPY | Age: 30
Setting detail: INFUSION SERIES
Discharge: HOME | End: 2020-04-15
Payer: COMMERCIAL

## 2020-04-15 VITALS
OXYGEN SATURATION: 100 % | SYSTOLIC BLOOD PRESSURE: 137 MMHG | RESPIRATION RATE: 17 BRPM | DIASTOLIC BLOOD PRESSURE: 77 MMHG | TEMPERATURE: 98.1 F | HEART RATE: 75 BPM

## 2020-04-15 DIAGNOSIS — C62.11 CANCER OF DESCENDED RIGHT TESTIS (HCC): ICD-10-CM

## 2020-04-15 DIAGNOSIS — N50.89 MASS OF RIGHT TESTICLE: Primary | ICD-10-CM

## 2020-04-15 PROCEDURE — 96417 CHEMO IV INFUS EACH ADDL SEQ: CPT

## 2020-04-15 PROCEDURE — 96413 CHEMO IV INFUSION 1 HR: CPT

## 2020-04-15 PROCEDURE — 2580000003 HC RX 258: Performed by: INTERNAL MEDICINE

## 2020-04-15 PROCEDURE — 2500000003 HC RX 250 WO HCPCS: Performed by: INTERNAL MEDICINE

## 2020-04-15 PROCEDURE — 6360000002 HC RX W HCPCS: Performed by: INTERNAL MEDICINE

## 2020-04-15 RX ORDER — SODIUM CHLORIDE 9 MG/ML
20 INJECTION, SOLUTION INTRAVENOUS ONCE
Status: COMPLETED | OUTPATIENT
Start: 2020-04-15 | End: 2020-04-15

## 2020-04-15 RX ORDER — DEXAMETHASONE SODIUM PHOSPHATE 10 MG/ML
10 INJECTION, SOLUTION INTRAMUSCULAR; INTRAVENOUS ONCE
Status: COMPLETED | OUTPATIENT
Start: 2020-04-15 | End: 2020-04-15

## 2020-04-15 RX ORDER — HEPARIN SODIUM (PORCINE) LOCK FLUSH IV SOLN 100 UNIT/ML 100 UNIT/ML
500 SOLUTION INTRAVENOUS PRN
Status: DISCONTINUED | OUTPATIENT
Start: 2020-04-15 | End: 2020-04-16 | Stop reason: HOSPADM

## 2020-04-15 RX ORDER — FUROSEMIDE 10 MG/ML
20 INJECTION INTRAMUSCULAR; INTRAVENOUS ONCE
Status: COMPLETED | OUTPATIENT
Start: 2020-04-15 | End: 2020-04-15

## 2020-04-15 RX ADMIN — FAMOTIDINE 20 MG: 10 INJECTION, SOLUTION INTRAVENOUS at 08:47

## 2020-04-15 RX ADMIN — SODIUM CHLORIDE 20 ML/HR: 9 INJECTION, SOLUTION INTRAVENOUS at 08:46

## 2020-04-15 RX ADMIN — DEXAMETHASONE SODIUM PHOSPHATE 10 MG: 10 INJECTION, SOLUTION INTRAMUSCULAR; INTRAVENOUS at 08:47

## 2020-04-15 RX ADMIN — CISPLATIN: 1 INJECTION INTRAVENOUS at 09:23

## 2020-04-15 RX ADMIN — ETOPOSIDE 246 MG: 20 INJECTION, SOLUTION, CONCENTRATE INTRAVENOUS at 10:45

## 2020-04-15 RX ADMIN — FUROSEMIDE 20 MG: 10 INJECTION, SOLUTION INTRAMUSCULAR; INTRAVENOUS at 10:43

## 2020-04-16 ENCOUNTER — HOSPITAL ENCOUNTER (OUTPATIENT)
Dept: INFUSION THERAPY | Age: 30
Setting detail: INFUSION SERIES
Discharge: HOME | End: 2020-04-16
Payer: COMMERCIAL

## 2020-04-16 VITALS
RESPIRATION RATE: 18 BRPM | TEMPERATURE: 97.4 F | SYSTOLIC BLOOD PRESSURE: 144 MMHG | DIASTOLIC BLOOD PRESSURE: 91 MMHG | HEART RATE: 85 BPM | OXYGEN SATURATION: 98 %

## 2020-04-16 DIAGNOSIS — C62.11 CANCER OF DESCENDED RIGHT TESTIS (HCC): ICD-10-CM

## 2020-04-16 DIAGNOSIS — N50.89 MASS OF RIGHT TESTICLE: Primary | ICD-10-CM

## 2020-04-16 PROCEDURE — 6360000002 HC RX W HCPCS: Performed by: INTERNAL MEDICINE

## 2020-04-16 PROCEDURE — 2580000003 HC RX 258: Performed by: INTERNAL MEDICINE

## 2020-04-16 PROCEDURE — 96417 CHEMO IV INFUS EACH ADDL SEQ: CPT

## 2020-04-16 PROCEDURE — 96413 CHEMO IV INFUSION 1 HR: CPT

## 2020-04-16 RX ORDER — PALONOSETRON 0.05 MG/ML
0.25 INJECTION, SOLUTION INTRAVENOUS ONCE
Status: COMPLETED | OUTPATIENT
Start: 2020-04-16 | End: 2020-04-16

## 2020-04-16 RX ORDER — SODIUM CHLORIDE 9 MG/ML
20 INJECTION, SOLUTION INTRAVENOUS ONCE
Status: COMPLETED | OUTPATIENT
Start: 2020-04-16 | End: 2020-04-16

## 2020-04-16 RX ORDER — ONDANSETRON 2 MG/ML
16 INJECTION INTRAMUSCULAR; INTRAVENOUS ONCE
Status: DISCONTINUED | OUTPATIENT
Start: 2020-04-16 | End: 2020-04-16 | Stop reason: ALTCHOICE

## 2020-04-16 RX ORDER — FUROSEMIDE 10 MG/ML
20 INJECTION INTRAMUSCULAR; INTRAVENOUS ONCE
Status: COMPLETED | OUTPATIENT
Start: 2020-04-16 | End: 2020-04-16

## 2020-04-16 RX ORDER — DEXAMETHASONE SODIUM PHOSPHATE 10 MG/ML
10 INJECTION, SOLUTION INTRAMUSCULAR; INTRAVENOUS ONCE
Status: COMPLETED | OUTPATIENT
Start: 2020-04-16 | End: 2020-04-16

## 2020-04-16 RX ADMIN — ETOPOSIDE 246 MG: 20 INJECTION, SOLUTION, CONCENTRATE INTRAVENOUS at 10:46

## 2020-04-16 RX ADMIN — CISPLATIN: 1 INJECTION INTRAVENOUS at 09:22

## 2020-04-16 RX ADMIN — POTASSIUM CHLORIDE: 2 INJECTION, SOLUTION, CONCENTRATE INTRAVENOUS at 10:44

## 2020-04-16 RX ADMIN — FUROSEMIDE 20 MG: 10 INJECTION, SOLUTION INTRAMUSCULAR; INTRAVENOUS at 10:44

## 2020-04-16 RX ADMIN — PALONOSETRON HYDROCHLORIDE 0.25 MG: 0.25 INJECTION, SOLUTION INTRAVENOUS at 08:50

## 2020-04-16 RX ADMIN — SODIUM CHLORIDE 20 ML/HR: 9 INJECTION, SOLUTION INTRAVENOUS at 08:50

## 2020-04-16 RX ADMIN — POTASSIUM CHLORIDE: 2 INJECTION, SOLUTION, CONCENTRATE INTRAVENOUS at 09:04

## 2020-04-16 RX ADMIN — DEXAMETHASONE SODIUM PHOSPHATE 10 MG: 10 INJECTION, SOLUTION INTRAMUSCULAR; INTRAVENOUS at 08:50

## 2020-04-17 ENCOUNTER — HOSPITAL ENCOUNTER (OUTPATIENT)
Dept: INFUSION THERAPY | Age: 30
Setting detail: INFUSION SERIES
Discharge: HOME | End: 2020-04-17
Payer: COMMERCIAL

## 2020-04-17 VITALS
BODY MASS INDEX: 36.4 KG/M2 | WEIGHT: 260 LBS | TEMPERATURE: 97.3 F | DIASTOLIC BLOOD PRESSURE: 94 MMHG | OXYGEN SATURATION: 100 % | HEART RATE: 96 BPM | HEIGHT: 71 IN | RESPIRATION RATE: 16 BRPM | SYSTOLIC BLOOD PRESSURE: 136 MMHG

## 2020-04-17 DIAGNOSIS — N50.89 MASS OF RIGHT TESTICLE: Primary | ICD-10-CM

## 2020-04-17 DIAGNOSIS — C62.11 CANCER OF DESCENDED RIGHT TESTIS (HCC): ICD-10-CM

## 2020-04-17 PROCEDURE — 6360000002 HC RX W HCPCS: Performed by: INTERNAL MEDICINE

## 2020-04-17 PROCEDURE — 96413 CHEMO IV INFUSION 1 HR: CPT

## 2020-04-17 PROCEDURE — 96377 APPLICATON ON-BODY INJECTOR: CPT

## 2020-04-17 PROCEDURE — 96360 HYDRATION IV INFUSION INIT: CPT

## 2020-04-17 PROCEDURE — 2580000003 HC RX 258: Performed by: INTERNAL MEDICINE

## 2020-04-17 PROCEDURE — 96361 HYDRATE IV INFUSION ADD-ON: CPT

## 2020-04-17 PROCEDURE — 96417 CHEMO IV INFUS EACH ADDL SEQ: CPT

## 2020-04-17 RX ORDER — FUROSEMIDE 10 MG/ML
20 INJECTION INTRAMUSCULAR; INTRAVENOUS ONCE
Status: COMPLETED | OUTPATIENT
Start: 2020-04-17 | End: 2020-04-17

## 2020-04-17 RX ORDER — SODIUM CHLORIDE 9 MG/ML
20 INJECTION, SOLUTION INTRAVENOUS ONCE
Status: COMPLETED | OUTPATIENT
Start: 2020-04-17 | End: 2020-04-18

## 2020-04-17 RX ORDER — DEXAMETHASONE SODIUM PHOSPHATE 10 MG/ML
10 INJECTION, SOLUTION INTRAMUSCULAR; INTRAVENOUS ONCE
Status: COMPLETED | OUTPATIENT
Start: 2020-04-17 | End: 2020-04-17

## 2020-04-17 RX ADMIN — POTASSIUM CHLORIDE: 2 INJECTION, SOLUTION, CONCENTRATE INTRAVENOUS at 10:36

## 2020-04-17 RX ADMIN — POTASSIUM CHLORIDE: 2 INJECTION, SOLUTION, CONCENTRATE INTRAVENOUS at 09:52

## 2020-04-17 RX ADMIN — DEXAMETHASONE SODIUM PHOSPHATE 10 MG: 10 INJECTION, SOLUTION INTRAMUSCULAR; INTRAVENOUS at 09:52

## 2020-04-17 RX ADMIN — SODIUM CHLORIDE 20 ML/HR: 9 INJECTION, SOLUTION INTRAVENOUS at 10:04

## 2020-04-17 RX ADMIN — FUROSEMIDE 20 MG: 10 INJECTION, SOLUTION INTRAMUSCULAR; INTRAVENOUS at 09:52

## 2020-04-17 RX ADMIN — PEGFILGRASTIM 6 MG: KIT SUBCUTANEOUS at 12:23

## 2020-04-17 RX ADMIN — ETOPOSIDE 246 MG: 20 INJECTION, SOLUTION, CONCENTRATE INTRAVENOUS at 11:13

## 2020-04-17 RX ADMIN — CISPLATIN: 1 INJECTION INTRAVENOUS at 10:04

## 2020-04-21 RX ORDER — PROMETHAZINE HYDROCHLORIDE 25 MG/1
25 TABLET ORAL EVERY 6 HOURS PRN
Qty: 20 TABLET | Refills: 0 | Status: SHIPPED | OUTPATIENT
Start: 2020-04-21 | End: 2020-08-13

## 2020-04-29 NOTE — PROGRESS NOTES
HEMATOLOGY ONCOLOGY PROGRESS NOTE    Pt Name: Paola Ventura  MRN: [id number]  YOB: 1990  Date of evaluation: 4/30/2020    HISTORY OF PRESENT ILLNESS:      Diagnosis  · Pure seminoma, February 2020  · [id number], stage IIB  · Good risk    Treatment summary  · 2/4/2020- right orchiectomy  · 2/10/2020 through 4/17/2020-4 cycles etoposide/cisplatin    The patient is a 34years old male who has been diagnosed with pure seminoma stage IIb. He underwent curative intent chemotherapy with cisplatin/etoposide x4 cycles. Treatment was tolerated with febrile neutropenia and SVT of the right upper extremity. He is now status post completion of chemotherapy 2 weeks ago. He denies any neuropathy. He feels well otherwise. He wants to go back to work. Cancer history  Mr. Paola Ventura was first seen by me on 2/5/2020. He presented to the ER department with complaints of back pain on 2/4/2020. A CT of the abdomen revealed a retroperitoneal mass. Further examination also revealed a right testicular mass. Urology was consulted and performed a right orchiectomy on 2/4/2020 consistent with pure seminoma. · 2/3/2020- CT of the abdomen showed 5 cm retroperitoneal lymph node mass within the upper abdomen in the aortocaval location. No liver metastasis. · 2/3/2020-CT of the chest showed no evidence of intrathoracic metastatic disease. · 2/4/2020-right orchiectomy by Dr. Nicki Bai at [facility] consistent with pure seminoma. Tumor measuring 2.5 cm and confined to the testis. Spermatic cord margin negative. Final pathologic staging [id number], S0 stage IIB  · 2/5/2020-recommended 4 cycles of etoposide 100 mg/m2 days 1-5 and cisplatin 20mg/m2 days 1-5 × 4 cycles q. 21 days  · 2/10/2020 LDH-229, AFP- 2.4, Beta HCG- <1  · 1/30/2945- complicated neutropenia with oral infection. Recommend to continue with amoxicillin. Will add Neupogen.   We will also add Neulasta to cycle activity: Not on file   Other Topics Concern    Not on file   Social History Narrative    Not on file   Smoker x12 years    Family History:     No family history of cancer    Current Hospital Medications:    No current facility-administered medications for this visit. Allergies: Allergies   Allergen Reactions    Nuts [Peanut-Containing Drug Products]     Other      peanuts         Subjective   REVIEW OF SYSTEMS:   CONSTITUTIONAL: no fever, no night sweats, fatigue;  HEENT: no blurring of vision, no double vision, no hearing difficulty, no tinnitus, no ulceration, no dysplasia, no epistaxis;  LUNGS: no cough, no hemoptysis, no wheeze,  no shortness of breath;  CARDIOVASCULAR: no palpitation, no chest pain, no shortness of breath;  GI: no abdominal pain, no nausea, no vomiting, no diarrhea, no constipation;  RISHI: no dysuria, no hematuria, no frequency or urgency, no nephrolithiasis;  MUSCULOSKELETAL: no joint pain, no swelling, no stiffness;   ENDOCRINE: no polyuria, no polydipsia, no cold or heat intolerance;  HEMATOLOGY: no easy bruising or bleeding, no history of clotting disorder;  DERMATOLOGY: no skin rash, no eczema, no pruritus;   PSYCHIATRY: no depression, no anxiety, no panic attacks, no suicidal ideation, no homicidal ideation;  NEUROLOGY: no syncope, no seizures, no numbness or tingling of hands, no numbness or tingling of feet, no paresis;    Objective   /62   Pulse 108   Temp 97.8 °F (36.6 °C)   Ht 5' 11\" (1.803 m)   Wt 259 lb 4.8 oz (117.6 kg)   SpO2 95%   BMI 36.17 kg/m²     PHYSICAL EXAM:  CONSTITUTIONAL: Alert, appropriate, no acute distress  EYES: Non icteric, EOM intact, pupils equal round   ENT: Mucus membranes moist, no oral pharyngeal lesions, external inspection of ears and nose are normal,   NECK: Supple, no masses. No palpable thyroid mass  CHEST/LUNGS: CTA bilaterally, normal respiratory effort   CARDIOVASCULAR: RRR, no murmurs.   No lower extremity edema  ABDOMEN: soft non-tender, active bowel sounds, no HSM. No palpable masses  EXTREMITIES: warm, full ROM in all 4 extremities, no focal weakness. SKIN: warm, dry with no rashes or lesions  LYMPH: No cervical, clavicular, axillary, or inguinal lymphadenopathy  NEUROLOGIC: follows commands, non focal   PSYCH: mood and affect appropriate. Alert and oriented to time, place, person      LABORATORY RESULTS REVIEWED BY ME:  CBC:4/30/2020  WBC-24.10  HGB-8.7  PLT-70,000  Neut-18.60      RADIOLOGY STUDIES REVIEWED BY ME:  Ct Abdomen Pelvis W Iv Contrast Additional Contrast? Oral    Result Date: 5/7/2020  EXAM: CT Abdomen and Pelvis with contrast      5/7/2020 9:50 AM INDICATION: C62.11 COMPARISON: CT abdomen pelvis dated 3/14/2020 TECHNIQUE: Abdomen and pelvis were scanned utilizing a multidetector helical scanner from the diaphragm to the ischial tuberosities after administration of IV contrast. Coronal and sagittal reformations were obtained. [Routine protocol is performed.] Radiation dose equals DLP 1689 mGy-cm. Automated exposure control dose reduction technique was implemented. FINDINGS: LINES and TUBES: None. LOWER THORAX: No focal consolidation, pleural effusion or pneumothorax. HEPATOBILIARY:  No focal hepatic lesions. No liver laceration. No biliary ductal dilatation. GALLBLADDER:  No radiopaque stones or sludge. No wall thickening. SPLEEN:  No splenomegaly. No splenic laceration. PANCREAS:  No focal masses or ductal dilatation. ADRENALS:  No adrenal nodules. KIDNEYS/URETERS:  Kidneys enhance symmetrically. No hydronephrosis. No cystic or solid mass lesions. 3 mm nonobstructive left renal stone. Briana Lint GI TRACT:  No abnormal distention, wall thickening, or evidence of bowel obstruction. No acute appendicitis. PELVIC ORGANS/BLADDER:  Unremarkable. LYMPH NODES:  Retroperitoneal lymphadenopathy continue to decrease with residual 1.1 cm aortocaval lymph node, previously 1.7 cm at the same level.  No new lymphadenopathy identified. VESSELS:  Unremarkable. PERITONEUM / RETROPERITONEUM:  No free air or fluid. BONES:  Pars defects are demonstrated at L4-L5. Erven Hurl SOFT TISSUES:  Postoperative changes in the right colon. .     Continued decrease in the retroperitoneal lymphadenopathy. Otherwise, no evidence of disease progression. Signed by Dr Doron Aldridge on 5/7/2020 10:09 AM    ASSESSMENT:    #SVT RUE- Eliquis x 6 weeks    #Pure seminoma stage IIB [id number]  The patient was counseled today about diagnosis, staging, prognosis, diagnostic tests, medications, side effects and disease management. The method of counseling included verbal explanation. The patient verbalized understanding. Essentially, stage IIB Pure Seminoma. I reviewed with the patient the NCCN guidelines. We discussed BEP x3 versus EP x4. The patient has a history of asthma and has also been a smoker for 12 years. Therefore, I had offered EP x4 to avoid lung toxicity of bleomycin. 4/17/2020-completion of 4 cycles of curative intent chemotherapy with cisplatin/etoposide. Addendum: Normal tumor markers and CT scan showed retroperitoneal lymphadenopathy measuring less than 3 cm. Therefore as per NCCN guidelines proceed with surveillance. #Risk of infertility-discussed with the patient at length about the risk of infertility. He was not interested at this time in seeking sperm banking. #Tobacco abuse- strongly encouraged to quit. PLAN:  CT chest abdomen pelvis  LDH, beta hCG, AFP  RTC 2 months    I have seen, examined and reviewed this patient medication list, appropriate labs and imaging studies. I reviewed relevant medical records and others physicians notes. I discussed the plans of care with the patient. I answered all the questions to the patients satisfaction.     (Please note that portions of this note were completed with a voice recognition program. Efforts were made to edit the dictations but occasionally words are mis-transcribed.)    I,

## 2020-04-30 ENCOUNTER — HOSPITAL ENCOUNTER (OUTPATIENT)
Dept: INFUSION THERAPY | Age: 30
Discharge: HOME | End: 2020-04-30
Payer: COMMERCIAL

## 2020-04-30 ENCOUNTER — OFFICE VISIT (OUTPATIENT)
Dept: HEMATOLOGY | Age: 30
End: 2020-04-30
Payer: COMMERCIAL

## 2020-04-30 VITALS
OXYGEN SATURATION: 95 % | DIASTOLIC BLOOD PRESSURE: 62 MMHG | TEMPERATURE: 97.8 F | HEIGHT: 71 IN | SYSTOLIC BLOOD PRESSURE: 118 MMHG | HEART RATE: 108 BPM | WEIGHT: 259.3 LBS | BODY MASS INDEX: 36.3 KG/M2

## 2020-04-30 DIAGNOSIS — C62.11 CANCER OF DESCENDED RIGHT TESTIS (HCC): ICD-10-CM

## 2020-04-30 LAB
ALPHA FETOPROTEIN: 4.2 NG/ML (ref 0–8.3)
LACTATE DEHYDROGENASE: 482 U/L (ref 91–215)

## 2020-04-30 PROCEDURE — 99211 OFF/OP EST MAY X REQ PHY/QHP: CPT

## 2020-04-30 PROCEDURE — 99214 OFFICE O/P EST MOD 30 MIN: CPT | Performed by: INTERNAL MEDICINE

## 2020-04-30 PROCEDURE — 85025 COMPLETE CBC W/AUTO DIFF WBC: CPT

## 2020-05-02 LAB — HCG TUMOR MARKER: <1 IU/L (ref 0–3)

## 2020-05-07 ENCOUNTER — HOSPITAL ENCOUNTER (OUTPATIENT)
Dept: CT IMAGING | Age: 30
Discharge: HOME | End: 2020-05-07
Payer: COMMERCIAL

## 2020-05-07 PROCEDURE — 6360000004 HC RX CONTRAST MEDICATION: Performed by: INTERNAL MEDICINE

## 2020-05-07 PROCEDURE — 74177 CT ABD & PELVIS W/CONTRAST: CPT

## 2020-05-07 RX ADMIN — IOPAMIDOL 75 ML: 755 INJECTION, SOLUTION INTRAVENOUS at 09:45

## 2020-06-16 ENCOUNTER — TELEPHONE (OUTPATIENT)
Dept: INFUSION THERAPY | Age: 30
End: 2020-06-16

## 2020-06-17 ENCOUNTER — HOSPITAL ENCOUNTER (EMERGENCY)
Age: 30
Discharge: HOME | End: 2020-06-17
Payer: COMMERCIAL

## 2020-06-17 ENCOUNTER — APPOINTMENT (OUTPATIENT)
Dept: CT IMAGING | Age: 30
End: 2020-06-17
Payer: COMMERCIAL

## 2020-06-17 VITALS
SYSTOLIC BLOOD PRESSURE: 112 MMHG | WEIGHT: 240 LBS | BODY MASS INDEX: 33.6 KG/M2 | RESPIRATION RATE: 18 BRPM | HEIGHT: 71 IN | TEMPERATURE: 97.8 F | DIASTOLIC BLOOD PRESSURE: 98 MMHG | HEART RATE: 73 BPM | OXYGEN SATURATION: 97 %

## 2020-06-17 LAB
ALBUMIN SERPL-MCNC: 4.3 G/DL (ref 3.5–5.2)
ALP BLD-CCNC: 95 U/L (ref 40–130)
ALT SERPL-CCNC: 27 U/L (ref 5–41)
ANION GAP SERPL CALCULATED.3IONS-SCNC: 8 MMOL/L (ref 7–19)
AST SERPL-CCNC: 32 U/L (ref 5–40)
BASOPHILS ABSOLUTE: 0.1 K/UL (ref 0–0.2)
BASOPHILS RELATIVE PERCENT: 1.9 % (ref 0–1)
BILIRUB SERPL-MCNC: 0.7 MG/DL (ref 0.2–1.2)
BUN BLDV-MCNC: 11 MG/DL (ref 6–20)
CALCIUM SERPL-MCNC: 9.5 MG/DL (ref 8.6–10)
CHLORIDE BLD-SCNC: 108 MMOL/L (ref 98–111)
CO2: 27 MMOL/L (ref 22–29)
CREAT SERPL-MCNC: 0.8 MG/DL (ref 0.5–1.2)
EOSINOPHILS ABSOLUTE: 0.4 K/UL (ref 0–0.6)
EOSINOPHILS RELATIVE PERCENT: 7.6 % (ref 0–5)
GFR NON-AFRICAN AMERICAN: >60
GLUCOSE BLD-MCNC: 116 MG/DL (ref 74–109)
HCT VFR BLD CALC: 45 % (ref 42–52)
HEMOGLOBIN: 15.1 G/DL (ref 14–18)
IMMATURE GRANULOCYTES #: 0 K/UL
LYMPHOCYTES ABSOLUTE: 1.4 K/UL (ref 1.1–4.5)
LYMPHOCYTES RELATIVE PERCENT: 23.4 % (ref 20–40)
MCH RBC QN AUTO: 33.4 PG (ref 27–31)
MCHC RBC AUTO-ENTMCNC: 33.6 G/DL (ref 33–37)
MCV RBC AUTO: 99.6 FL (ref 80–94)
MONOCYTES ABSOLUTE: 0.5 K/UL (ref 0–0.9)
MONOCYTES RELATIVE PERCENT: 8.3 % (ref 0–10)
NEUTROPHILS ABSOLUTE: 3.4 K/UL (ref 1.5–7.5)
NEUTROPHILS RELATIVE PERCENT: 58.6 % (ref 50–65)
PDW BLD-RTO: 12 % (ref 11.5–14.5)
PLATELET # BLD: 183 K/UL (ref 130–400)
PMV BLD AUTO: 10.5 FL (ref 9.4–12.4)
POTASSIUM REFLEX MAGNESIUM: 4.4 MMOL/L (ref 3.5–5)
RBC # BLD: 4.52 M/UL (ref 4.7–6.1)
SODIUM BLD-SCNC: 143 MMOL/L (ref 136–145)
TOTAL PROTEIN: 6.9 G/DL (ref 6.6–8.7)
WBC # BLD: 5.8 K/UL (ref 4.8–10.8)

## 2020-06-17 PROCEDURE — 36415 COLL VENOUS BLD VENIPUNCTURE: CPT

## 2020-06-17 PROCEDURE — 99284 EMERGENCY DEPT VISIT MOD MDM: CPT

## 2020-06-17 PROCEDURE — 85025 COMPLETE CBC W/AUTO DIFF WBC: CPT

## 2020-06-17 PROCEDURE — 72131 CT LUMBAR SPINE W/O DYE: CPT

## 2020-06-17 PROCEDURE — 70450 CT HEAD/BRAIN W/O DYE: CPT

## 2020-06-17 PROCEDURE — 80053 COMPREHEN METABOLIC PANEL: CPT

## 2020-06-17 PROCEDURE — 72125 CT NECK SPINE W/O DYE: CPT

## 2020-06-17 ASSESSMENT — ENCOUNTER SYMPTOMS
RESPIRATORY NEGATIVE: 1
BACK PAIN: 0

## 2020-06-17 NOTE — ED PROVIDER NOTES
[facility]  eMERGENCY dEPARTMENT eNCOUnter      Pt Name: Felicity Gabriel  MRN: [id number]  Sonaligfmassiel 1990  Date of evaluation: 6/17/2020  Provider: Grace Joel, [address]       Chief Complaint   Patient presents with    Tingling     bilat finger numbness          HISTORY OF PRESENT ILLNESS   (Location/Symptom, Timing/Onset,Context/Setting, Quality, Duration, Modifying Factors, Severity)  Note limiting factors. Srinath Massing a 34 y.o. male who presents to the emergency department for evaluation of tingling. Pt tells me that he has had bilateral upper and lower extremity numbness/tingling over past 5 days. He tells me that he developed these symptoms after riding a raft a [employer]. He denies injury. He tells me that he has had previous L2 lumbar vertebral fracture. He does not describe symptoms occurring along a dermatome to me. He describes general tingling sensation from both forearms and both lower legs radiating to hands and feet. He denies fever as well as constitutional symptoms of illness. He tells me that he recently finished last dose of chemotherapy giving history of testicular cancer followed by Dr. Renee Pedro. HPI    Nursing Notes were reviewed. REVIEW OF SYSTEMS    (2-9 systems for level 4, 10 or more for level 5)     Review of Systems   Constitutional: Negative. Respiratory: Negative. Cardiovascular: Negative. Musculoskeletal: Negative for back pain and neck pain. Neurological: Positive for numbness (bilateral hands/bilateral feet). Negative for weakness. All other systems reviewed and are negative. A complete review of systems was performed and is negative except as noted above in the HPI.        PAST MEDICAL HISTORY     Past Medical History:   Diagnosis Date    Back pain     Cancer ([address]     testicular    Hypertension     Lumbar stress fracture          SURGICAL HISTORY       Past Surgical History:   Procedure Laterality Date    TESTICLE REMOVAL Right 2/4/2020    RIGHT [facility] [address]       Discharge Medication List as of 6/17/2020  1:10 PM      CONTINUE these medications which have NOT CHANGED    Details   promethazine (PHENERGAN) 25 MG tablet Take 1 tablet by mouth every 6 hours as needed for Nausea, Disp-20 tablet, R-0Normal      ondansetron (ZOFRAN ODT) 4 MG disintegrating tablet Take 1 tablet by mouth every 8 hours as needed for Nausea or Vomiting, Disp-60 tablet, R-0Normal      albuterol sulfate  (90 Base) MCG/ACT inhaler USE 2 PUFFS Q 4 H PRNHistorical Med      HYDROcodone-acetaminophen (NORCO)  MG per tablet Take 1 tablet by mouth every 6 hours as needed for Pain. Historical Med      lisinopril (PRINIVIL;ZESTRIL) 10 MG tablet Take 10 mg by mouth dailyHistorical Med      montelukast (SINGULAIR) 10 MG tablet Take 10 mg by mouth nightlyHistorical Med             ALLERGIES     Nuts [peanut-containing drug products] and Other    FAMILY HISTORY       Family History   Problem Relation Age of Onset    High Blood Pressure Mother     Other Mother         Kidney Stones          SOCIAL HISTORY       Social History     Socioeconomic History    Marital status:      Spouse name: None    Number of children: None    Years of education: None    Highest education level: None   Occupational History    None   Social Needs    Financial resource strain: None    Food insecurity     Worry: None     Inability: None    Transportation needs     Medical: None     Non-medical: None   Tobacco Use    Smoking status: Former Smoker     Packs/day: 0.50    Smokeless tobacco: Never Used   Substance and Sexual Activity    Alcohol use: No    Drug use: No    Sexual activity: Yes     Partners: Female   Lifestyle    Physical activity     Days per week: None     Minutes per session: None    Stress: None   Relationships    Social connections     Talks on phone: None     Gets together: (1.803 m)         MDM      CONSULTS:  None    PROCEDURES:  Unless otherwise notedbelow, none     Procedures    FINAL IMPRESSION     1.  Paresthesia          DISPOSITION/PLAN   DISPOSITION Decision To Discharge 06/17/2020 01:01:59 PM      PATIENT REFERRED TO:  Clementine Schumacher DO  [address]  [id number]  [phone]    Schedule an appointment as soon as possible for a visit in 3 days  fail to improve      DISCHARGE MEDICATIONS:       Discharge Medication List as of 6/17/2020  1:10 PM          (Pleasenote that portions of this note were completed with a voice recognition program.  Efforts were made to edit the dictations but occasionally words are mis-transcribed.)              Mandy Petersen, APRN  06/17/20 1019

## 2020-06-17 NOTE — ED NOTES
Bed: 18  Expected date:   Expected time:   Means of arrival:   Comments:  Open at [address], [facility]  06/17/20 6219

## 2020-06-18 ENCOUNTER — CARE COORDINATION (OUTPATIENT)
Dept: CARE COORDINATION | Age: 30
End: 2020-06-18

## 2020-06-18 ENCOUNTER — OFFICE VISIT (OUTPATIENT)
Dept: HEMATOLOGY | Age: 30
End: 2020-06-18
Payer: COMMERCIAL

## 2020-06-18 ENCOUNTER — HOSPITAL ENCOUNTER (OUTPATIENT)
Dept: INFUSION THERAPY | Age: 30
Discharge: HOME | End: 2020-06-18
Payer: COMMERCIAL

## 2020-06-18 VITALS
HEART RATE: 72 BPM | OXYGEN SATURATION: 97 % | TEMPERATURE: 98 F | DIASTOLIC BLOOD PRESSURE: 80 MMHG | SYSTOLIC BLOOD PRESSURE: 128 MMHG | WEIGHT: 266 LBS | BODY MASS INDEX: 37.24 KG/M2 | HEIGHT: 71 IN

## 2020-06-18 DIAGNOSIS — C62.11 CANCER OF DESCENDED RIGHT TESTIS (HCC): ICD-10-CM

## 2020-06-18 LAB
BASOPHILS ABSOLUTE: 0.05 K/UL (ref 0.01–0.08)
BASOPHILS RELATIVE PERCENT: 0.8 % (ref 0.1–1.2)
EOSINOPHILS ABSOLUTE: 0.37 K/UL (ref 0.04–0.54)
EOSINOPHILS RELATIVE PERCENT: 5.6 % (ref 0.7–7)
HCT VFR BLD CALC: 43.1 % (ref 40.1–51)
HEMOGLOBIN: 14.8 G/DL (ref 13.7–17.5)
LACTATE DEHYDROGENASE: 506 U/L (ref 313–618)
LYMPHOCYTES ABSOLUTE: 1.43 K/UL (ref 1.18–3.74)
LYMPHOCYTES RELATIVE PERCENT: 21.8 % (ref 19.3–53.1)
MCH RBC QN AUTO: 34.9 PG (ref 25.7–32.2)
MCHC RBC AUTO-ENTMCNC: 34.3 G/DL (ref 32.3–36.5)
MCV RBC AUTO: 101.7 FL (ref 79–92.2)
MONOCYTES ABSOLUTE: 0.48 K/UL (ref 0.24–0.82)
MONOCYTES RELATIVE PERCENT: 7.3 % (ref 4.7–12.5)
NEUTROPHILS ABSOLUTE: 4.24 K/UL (ref 1.56–6.13)
NEUTROPHILS RELATIVE PERCENT: 64.5 % (ref 34–71.1)
PDW BLD-RTO: 11.9 % (ref 11.6–14.4)
PLATELET # BLD: 173 K/UL (ref 163–337)
PMV BLD AUTO: 10 FL (ref 7.4–10.4)
RBC # BLD: 4.24 M/UL (ref 4.63–6.08)
WBC # BLD: 6.57 K/UL (ref 4.23–9.07)

## 2020-06-18 PROCEDURE — 99213 OFFICE O/P EST LOW 20 MIN: CPT

## 2020-06-18 PROCEDURE — 83615 LACTATE (LD) (LDH) ENZYME: CPT

## 2020-06-18 PROCEDURE — 85025 COMPLETE CBC W/AUTO DIFF WBC: CPT

## 2020-06-18 PROCEDURE — 99214 OFFICE O/P EST MOD 30 MIN: CPT | Performed by: INTERNAL MEDICINE

## 2020-06-18 NOTE — PROGRESS NOTES
HEMATOLOGY ONCOLOGY PROGRESS NOTE    Pt Name: Malia Ray  MRN: [id number]  YOB: 1990  Date of evaluation: 6/18/2020    HISTORY OF PRESENT ILLNESS:      Diagnosis  · Pure seminoma, February 2020  · [id number], stage IIB  · Good risk    Treatment summary  · 2/4/2020- right orchiectomy  · 2/10/2020 through 4/17/2020-4 cycles etoposide/cisplatin    The patient is a 34years old male who has been diagnosed with pure seminoma stage IIb. He underwent curative intent chemotherapy with cisplatin/etoposide x4 cycles completed every 2020. Patient presents today with complaints of generalized numbness and tingling in the right and lower extremities. The symptoms started last Friday. They have been persistent. He was in the emergency yesterday had a CT of the head and CT of cervical and lumbar spine that were mostly unremarkable. .     Cancer history  Mr. Malia Ray was first seen by me on 2/5/2020. He presented to the ER department with complaints of back pain on 2/4/2020. A CT of the abdomen revealed a retroperitoneal mass. Further examination also revealed a right testicular mass. Urology was consulted and performed a right orchiectomy on 2/4/2020 consistent with pure seminoma. · 2/3/2020- CT of the abdomen showed 5 cm retroperitoneal lymph node mass within the upper abdomen in the aortocaval location. No liver metastasis. · 2/3/2020-CT of the chest showed no evidence of intrathoracic metastatic disease. · 2/4/2020-right orchiectomy by Dr. Paty Rodrigues at [facility] consistent with pure seminoma. Tumor measuring 2.5 cm and confined to the testis. Spermatic cord margin negative. Final pathologic staging [id number], S0 stage IIB  · 2/5/2020-recommended 4 cycles of etoposide 100 mg/m2 days 1-5 and cisplatin 20mg/m2 days 1-5 × 4 cycles q. 21 days  · 2/10/2020 LDH-229, AFP- 2.4, Beta HCG- <1  · 2/74/2161- complicated neutropenia with oral infection.   Recommend RRR, no murmurs. No lower extremity edema  ABDOMEN: soft non-tender, active bowel sounds, no HSM. No palpable masses  EXTREMITIES: warm, full ROM in all 4 extremities, no focal weakness. SKIN: warm, dry with no rashes or lesions  LYMPH: No cervical, clavicular, axillary, or inguinal lymphadenopathy  NEUROLOGIC: follows commands, non focal   PSYCH: mood and affect appropriate. Alert and oriented to time, place, person      LABORATORY RESULTS REVIEWED BY ME:  CBC: 6/18/2020  WBC- 6.57  HGB- 14.8  PLT- 173,000  Neut- 4.24    4/30/2020  AFP-4.2  GYJ-631  HCG-<1    RADIOLOGY STUDIES REVIEWED BY ME:  Ct Head Wo Contrast    Result Date: 6/17/2020  CT HEAD WO CONTRAST 6/17/2020 9:15 AM HISTORY: Paresthesias and numbness in the hands and feet COMPARISON: None DLP: 866 mGy cm. In order to have a CT radiation dose as low as reasonably achievable, Automated Exposure Control was utilized for adjustment of the mA and/or KV according to patient size. TECHNIQUE: Helical tomographic images of the brain were obtained without the use of intravenous contrast. FINDINGS: The midline structures are nondisplaced. The ventricles and basilar cisterns are normal in size and configuration. There is no evidence of intracranial hemorrhage or mass-effect. The gray-white matter differentiation is maintained. The sulci have a normal configuration, and there are no abnormal extra-axial fluid collections. The structures of the posterior fossa are unremarkable. The included orbits and their contents are unremarkable. Polypoid mucosal thickening is seen in the inferior maxillary sinuses. The visualized osseous structures and overlying soft tissues of the skull and face are unremarkable. 1. No acute intracranial process.  Signed by Dr Girish Marquez on 6/17/2020 11:14 AM    Ct Cervical Spine Wo Contrast    Result Date: 6/17/2020  CT CERVICAL SPINE WO CONTRAST 6/17/2020 9:15 AM HISTORY: Paresthesias in the hands and feet COMPARISON: None DLP: 600 prominent central lumbar spinal canal stenosis identified. Only mild neural foramen narrowing considered at the lower 2 lumbar levels. 2. L4 spondylolysis/pars defects with no abnormal subluxation. Signed by Dr Belkys Mario on 6/17/2020 11:29 AM      ASSESSMENT:    #JORGE DARIANAshley Eliquis x 6 weeks. Completed    #Pure seminoma stage IIB [id number]  The patient was counseled today about diagnosis, staging, prognosis, diagnostic tests, medications, side effects and disease management. The method of counseling included verbal explanation. The patient verbalized understanding. Essentially, stage IIB Pure Seminoma. I reviewed with the patient the NCCN guidelines. We discussed BEP x3 versus EP x4. The patient has a history of asthma and has also been a smoker for 12 years. Therefore, I had offered EP x4 to avoid lung toxicity of bleomycin. 4/17/2020-completion of 4 cycles of curative intent chemotherapy with cisplatin/etoposide. Addendum: Normal tumor markers and CT scan showed retroperitoneal lymphadenopathy measuring less than 3 cm. Therefore as per NCCN guidelines proceed with surveillance. Numbness/tingling- order nerve conduction studies and refer to neurology. CT of the head was unremarkable. CT cervical spine unremarkable. #Risk of infertility-discussed with the patient at length about the risk of infertility. He was not interested at this time in seeking sperm banking. #Tobacco abuse- strongly encouraged to quit. PLAN:  CXR and CT abdomen pelvis August   LDH, beta hCG, AFP today and follow up  RTC 2 months  Nerve conduction studies  Refer to neurology    I have seen, examined and reviewed this patient medication list, appropriate labs and imaging studies. I reviewed relevant medical records and others physicians notes. I discussed the plans of care with the patient. I answered all the questions to the patients satisfaction.     (Please note that portions of this note were completed with a

## 2020-06-30 ENCOUNTER — CARE COORDINATION (OUTPATIENT)
Dept: CARE COORDINATION | Age: 30
End: 2020-06-30

## 2020-06-30 NOTE — CARE COORDINATION
Telephoned the patient to conduct 14 day ED follow-up call for COVID-19 monitoring. No answer. Left voice message asking for a call back.     Submitted by Zhen/LANA

## 2020-07-01 NOTE — CARE COORDINATION
Telephoned the patient to conduct 14 day ED follow-up call for COVID-19 monitoring. No answer. No voice message left. Patient has not returned attempts to contact him since his Emergency Room visit. Closing encounter.     Submitted by Zhen/LANA

## 2020-07-02 ENCOUNTER — HOSPITAL ENCOUNTER (OUTPATIENT)
Dept: NEUROLOGY | Age: 30
Discharge: HOME | End: 2020-07-02
Payer: COMMERCIAL

## 2020-07-02 PROCEDURE — 95886 MUSC TEST DONE W/N TEST COMP: CPT | Performed by: PSYCHIATRY & NEUROLOGY

## 2020-07-02 PROCEDURE — 95913 NRV CNDJ TEST 13/> STUDIES: CPT | Performed by: PSYCHIATRY & NEUROLOGY

## 2020-07-02 PROCEDURE — 95886 MUSC TEST DONE W/N TEST COMP: CPT

## 2020-07-02 PROCEDURE — 95913 NRV CNDJ TEST 13/> STUDIES: CPT

## 2020-07-30 ENCOUNTER — TELEPHONE (OUTPATIENT)
Dept: NEUROSURGERY | Age: 30
End: 2020-07-30

## 2020-07-31 NOTE — TELEPHONE ENCOUNTER
Called patient back he stated that he already had the results from recent NCS wanted to see if he could just cancel and reschedule at a later date

## 2020-08-05 ENCOUNTER — HOSPITAL ENCOUNTER (OUTPATIENT)
Dept: CT IMAGING | Age: 30
Discharge: HOME | End: 2020-08-05
Payer: COMMERCIAL

## 2020-08-05 PROCEDURE — 74177 CT ABD & PELVIS W/CONTRAST: CPT

## 2020-08-05 PROCEDURE — 6360000004 HC RX CONTRAST MEDICATION: Performed by: INTERNAL MEDICINE

## 2020-08-05 PROCEDURE — 71260 CT THORAX DX C+: CPT

## 2020-08-05 RX ADMIN — IOPAMIDOL 75 ML: 755 INJECTION, SOLUTION INTRAVENOUS at 09:42

## 2020-08-11 NOTE — PROGRESS NOTES
HEMATOLOGY ONCOLOGY PROGRESS NOTE    Pt Name: Elijah Platt  MRN: [id number]  YOB: 1990  Date of evaluation: 8/13/2020    HISTORY OF PRESENT ILLNESS:      Diagnosis  · Pure seminoma, February 2020  · [id number], stage IIB  · Good risk    Treatment summary  · 2/4/2020- right orchiectomy  · 2/10/2020 through 4/17/2020-4 cycles etoposide/cisplatin    The patient is a 27years old male who has been diagnosed with pure seminoma stage IIb. He underwent curative intent chemotherapy with cisplatin/etoposide x4 cycles completed April 2020. Patient had complaints of generalized numbness and tingling in the upper and lower extremities. He was seen by neurology and had a nerve conduction study performed that showed mild polyneuropathy and likely carpal tunnel syndrome on the leftht. Cancer history  Mr. Héctor Anderson was first seen by me on 2/5/2020. He presented to the ER department with complaints of back pain on 2/4/2020. A CT of the abdomen revealed a retroperitoneal mass. Further examination also revealed a right testicular mass. Urology was consulted and performed a right orchiectomy on 2/4/2020 consistent with pure seminoma. · 2/3/2020- CT of the abdomen showed 5 cm retroperitoneal lymph node mass within the upper abdomen in the aortocaval location. No liver metastasis. · 2/3/2020-CT of the chest showed no evidence of intrathoracic metastatic disease. · 2/4/2020-right orchiectomy by Dr. Kvng Ha at [facility] consistent with pure seminoma. Tumor measuring 2.5 cm and confined to the testis. Spermatic cord margin negative. Final pathologic staging [id number], S0 stage IIB  · 2/5/2020-recommended 4 cycles of etoposide 100 mg/m2 days 1-5 and cisplatin 20mg/m2 days 1-5 × 4 cycles q. 21 days  · 2/10/2020 LDH-229, AFP- 2.4, Beta HCG- <1  · 6/16/5125- complicated neutropenia with oral infection. Recommend to continue with amoxicillin. Will add Neupogen. We will also add Neulasta to cycle #2. · 3/14/2020-CT abdomen pelvis showed  No acute process in the abdomen or pelvis. Prior right orchiectomy. Decreasing retroperitoneal/aortocaval adenopathy. No new or worsening adenopathy identified. 2 mm nonobstructing left renal stone. No obstructing stones identified. Bilateral L4 spondylolysis without spondylolisthesis. · 4/17/2020-completion of 4 cycles of cisplatin/etoposide  · 8/5/2020 CT C/A/P- No CT evidence of metastatic disease in the chest.  No acute cardiopulmonary process. Stable CT appearance the abdomen and pelvis without evidence of progression of neoplastic disease. Mildly prominent interaortocaval lymph node measuring 2.0 x 1.1 cm again identified, unchanged.               Past Medical History:    Past Medical History:   Diagnosis Date    Back pain     Cancer ([facility] [address]     testicular    Hypertension     Lumbar stress fracture    History of asthma  Tobacco abuse x12 years    Past Surgical History:      · Right orchiectomy 2/4/2020    Social History:    Social History     Socioeconomic History    Marital status:      Spouse name: Not on file    Number of children: Not on file    Years of education: Not on file    Highest education level: Not on file   Occupational History    Not on file   Social Needs    Financial resource strain: Not on file    Food insecurity     Worry: Not on file     Inability: Not on file   [employer] needs     Medical: Not on file     Non-medical: Not on file   Tobacco Use    Smoking status: Former Smoker     Packs/day: 0.50    Smokeless tobacco: Never Used   Substance and Sexual Activity    Alcohol use: No    Drug use: No    Sexual activity: Yes     Partners: Female   Lifestyle    Physical activity     Days per week: Not on file     Minutes per session: Not on file    Stress: Not on file   Relationships    Social connections     Talks on phone: Not on file     Gets together: Not on file     Attends Yarsanism service: Not on file     Active member of club or organization: Not on file     Attends meetings of clubs or organizations: Not on file     Relationship status: Not on file    Intimate partner violence     Fear of current or ex partner: Not on file     Emotionally abused: Not on file     Physically abused: Not on file     Forced sexual activity: Not on file   Other Topics Concern    Not on file   Social History Narrative    Not on file   Smoker x12 years    Family History:     No family history of cancer    Current Hospital Medications:    No current facility-administered medications for this visit. Allergies:    Allergies   Allergen Reactions    Nuts [Peanut-Containing Drug Products]     Other      peanuts         Subjective   REVIEW OF SYSTEMS:   CONSTITUTIONAL: no fever, no night sweats, fatigue;  HEENT: no blurring of vision, no double vision, no hearing difficulty, no tinnitus, no ulceration, no dysplasia, no epistaxis;  LUNGS: no cough, no hemoptysis, no wheeze,  no shortness of breath;  CARDIOVASCULAR: no palpitation, no chest pain, no shortness of breath;  GI: no abdominal pain, no nausea, no vomiting, no diarrhea, no constipation;  RISHI: no dysuria, no hematuria, no frequency or urgency, no nephrolithiasis;  MUSCULOSKELETAL: no joint pain, no swelling, no stiffness;   ENDOCRINE: no polyuria, no polydipsia, no cold or heat intolerance;  HEMATOLOGY: no easy bruising or bleeding, no history of clotting disorder;  DERMATOLOGY: no skin rash, no eczema, no pruritus;   PSYCHIATRY: no depression, no anxiety, no panic attacks, no suicidal ideation, no homicidal ideation;  NEUROLOGY: no syncope, no seizures, numbness/tingling/shooting pain of BUE and BLEs, no paresis;    Objective   /88   Pulse 95   Temp 97.3 °F (36.3 °C) (Temporal)   Ht 5' 11\" (1.803 m)   Wt 262 lb 8 oz (119.1 kg)   SpO2 96%   BMI 36.61 kg/m²     PHYSICAL EXAM:  CONSTITUTIONAL: Alert, appropriate, no acute distress  EYES: Non icteric, EOM intact, pupils equal round   ENT: Mucus membranes moist, no oral pharyngeal lesions, external inspection of ears and nose are normal,   NECK: Supple, no masses. No palpable thyroid mass  CHEST/LUNGS: CTA bilaterally, normal respiratory effort   CARDIOVASCULAR: RRR, no murmurs. No lower extremity edema  ABDOMEN: soft non-tender, active bowel sounds, no HSM. No palpable masses  EXTREMITIES: warm, full ROM in all 4 extremities, no focal weakness. SKIN: warm, dry with no rashes or lesions  LYMPH: No cervical, clavicular, axillary, or inguinal lymphadenopathy  NEUROLOGIC: follows commands, non focal   PSYCH: mood and affect appropriate. Alert and oriented to time, place, person      LABORATORY RESULTS REVIEWED BY ME:  CBC:8/13/2020  WBC-6.39  HGB-16.7  PLT-169,000  Neut-3.79      RADIOLOGY STUDIES REVIEWED BY ME:    Ct Chest W Contrast    Result Date: 8/5/2020  1. No CT evidence of metastatic disease in the chest. 2. No acute cardiopulmonary process. Signed by Dr Poli Longoria on 8/5/2020 9:52 AM    Ct Abdomen Pelvis W Iv Contrast    Result Date: 8/5/2020  1. Stable CT appearance the abdomen and pelvis without evidence of progression of neoplastic disease. Mildly prominent interaortocaval lymph node again identified, unchanged. Signed by Dr Poli Longoria on 8/5/2020 10:02 AM          ASSESSMENT:    #SVT RUE- Eliquis x 6 weeks. Completed    #Pure seminoma stage IIB [id number]  The patient was counseled today about diagnosis, staging, prognosis, diagnostic tests, medications, side effects and disease management. The method of counseling included verbal explanation. The patient verbalized understanding. Essentially, stage IIB Pure Seminoma. I reviewed with the patient the NCCN guidelines. We discussed BEP x3 versus EP x4. The patient has a history of asthma and has also been a smoker for 12 years.   Therefore, I had offered EP x4 to avoid lung toxicity of

## 2020-08-13 ENCOUNTER — OFFICE VISIT (OUTPATIENT)
Dept: HEMATOLOGY | Age: 30
End: 2020-08-13
Payer: COMMERCIAL

## 2020-08-13 ENCOUNTER — HOSPITAL ENCOUNTER (OUTPATIENT)
Dept: INFUSION THERAPY | Age: 30
Discharge: HOME | End: 2020-08-13
Payer: COMMERCIAL

## 2020-08-13 VITALS
SYSTOLIC BLOOD PRESSURE: 118 MMHG | OXYGEN SATURATION: 96 % | BODY MASS INDEX: 36.75 KG/M2 | HEIGHT: 71 IN | WEIGHT: 262.5 LBS | HEART RATE: 95 BPM | DIASTOLIC BLOOD PRESSURE: 88 MMHG | TEMPERATURE: 97.3 F

## 2020-08-13 DIAGNOSIS — C62.11 CANCER OF DESCENDED RIGHT TESTIS (HCC): ICD-10-CM

## 2020-08-13 LAB
BASOPHILS ABSOLUTE: 0.04 K/UL (ref 0.01–0.08)
BASOPHILS RELATIVE PERCENT: 0.6 % (ref 0.1–1.2)
EOSINOPHILS ABSOLUTE: 0.22 K/UL (ref 0.04–0.54)
EOSINOPHILS RELATIVE PERCENT: 3.4 % (ref 0.7–7)
HCT VFR BLD CALC: 49.1 % (ref 40.1–51)
HEMOGLOBIN: 16.7 G/DL (ref 13.7–17.5)
LACTATE DEHYDROGENASE: 516 U/L (ref 313–618)
LYMPHOCYTES ABSOLUTE: 1.69 K/UL (ref 1.18–3.74)
LYMPHOCYTES RELATIVE PERCENT: 26.4 % (ref 19.3–53.1)
MCH RBC QN AUTO: 32.7 PG (ref 25.7–32.2)
MCHC RBC AUTO-ENTMCNC: 34 G/DL (ref 32.3–36.5)
MCV RBC AUTO: 96.3 FL (ref 79–92.2)
MONOCYTES ABSOLUTE: 0.65 K/UL (ref 0.24–0.82)
MONOCYTES RELATIVE PERCENT: 10.2 % (ref 4.7–12.5)
NEUTROPHILS ABSOLUTE: 3.79 K/UL (ref 1.56–6.13)
NEUTROPHILS RELATIVE PERCENT: 59.4 % (ref 34–71.1)
PDW BLD-RTO: 11.8 % (ref 11.6–14.4)
PLATELET # BLD: 169 K/UL (ref 163–337)
PMV BLD AUTO: 10.3 FL (ref 7.4–10.4)
RBC # BLD: 5.1 M/UL (ref 4.63–6.08)
WBC # BLD: 6.39 K/UL (ref 4.23–9.07)

## 2020-08-13 PROCEDURE — 99212 OFFICE O/P EST SF 10 MIN: CPT

## 2020-08-13 PROCEDURE — 83615 LACTATE (LD) (LDH) ENZYME: CPT

## 2020-08-13 PROCEDURE — 99214 OFFICE O/P EST MOD 30 MIN: CPT | Performed by: INTERNAL MEDICINE

## 2020-08-13 PROCEDURE — 85025 COMPLETE CBC W/AUTO DIFF WBC: CPT

## 2020-08-13 RX ORDER — NORTRIPTYLINE HYDROCHLORIDE 10 MG/1
CAPSULE ORAL
COMMUNITY
Start: 2020-08-10 | End: 2021-12-29

## 2020-08-13 RX ORDER — CYCLOBENZAPRINE HCL 10 MG
TABLET ORAL
COMMUNITY
Start: 2020-07-15 | End: 2021-12-29

## 2020-08-13 RX ORDER — DUPILUMAB 300 MG/2ML
INJECTION, SOLUTION SUBCUTANEOUS
COMMUNITY
Start: 2020-08-08

## 2020-10-01 ENCOUNTER — HOSPITAL ENCOUNTER (OUTPATIENT)
Dept: CT IMAGING | Age: 30
Discharge: HOME | End: 2020-10-01
Payer: COMMERCIAL

## 2020-10-01 PROCEDURE — 74177 CT ABD & PELVIS W/CONTRAST: CPT

## 2020-10-01 PROCEDURE — 6360000004 HC RX CONTRAST MEDICATION: Performed by: INTERNAL MEDICINE

## 2020-10-01 RX ADMIN — IOPAMIDOL 75 ML: 755 INJECTION, SOLUTION INTRAVENOUS at 08:59

## 2020-10-12 ENCOUNTER — HOSPITAL ENCOUNTER (OUTPATIENT)
Dept: GENERAL RADIOLOGY | Age: 30
Discharge: HOME | End: 2020-10-12
Payer: COMMERCIAL

## 2020-10-12 ENCOUNTER — OFFICE VISIT (OUTPATIENT)
Dept: HEMATOLOGY | Age: 30
End: 2020-10-12
Payer: COMMERCIAL

## 2020-10-12 ENCOUNTER — HOSPITAL ENCOUNTER (OUTPATIENT)
Dept: INFUSION THERAPY | Age: 30
Discharge: HOME | End: 2020-10-12
Payer: COMMERCIAL

## 2020-10-12 VITALS
HEIGHT: 71 IN | SYSTOLIC BLOOD PRESSURE: 154 MMHG | DIASTOLIC BLOOD PRESSURE: 86 MMHG | OXYGEN SATURATION: 96 % | HEART RATE: 81 BPM | WEIGHT: 263.3 LBS | BODY MASS INDEX: 36.86 KG/M2 | TEMPERATURE: 97.5 F

## 2020-10-12 DIAGNOSIS — C62.11 CANCER OF DESCENDED RIGHT TESTIS (HCC): ICD-10-CM

## 2020-10-12 LAB
ALBUMIN SERPL-MCNC: 4.7 G/DL (ref 3.5–5.2)
ALP BLD-CCNC: 102 U/L (ref 40–130)
ALT SERPL-CCNC: 20 U/L (ref 21–72)
ANION GAP SERPL CALCULATED.3IONS-SCNC: 10 MMOL/L (ref 7–19)
AST SERPL-CCNC: 22 U/L (ref 17–59)
BASOPHILS ABSOLUTE: 0.05 K/UL (ref 0.01–0.08)
BASOPHILS RELATIVE PERCENT: 0.8 % (ref 0.1–1.2)
BILIRUB SERPL-MCNC: 1 MG/DL (ref 0.2–1.3)
BUN BLDV-MCNC: 14 MG/DL (ref 9–20)
CALCIUM SERPL-MCNC: 9.4 MG/DL (ref 8.4–10.2)
CHLORIDE BLD-SCNC: 104 MMOL/L (ref 98–111)
CO2: 29 MMOL/L (ref 22–29)
CREAT SERPL-MCNC: 0.7 MG/DL (ref 0.6–1.2)
EOSINOPHILS ABSOLUTE: 0.31 K/UL (ref 0.04–0.54)
EOSINOPHILS RELATIVE PERCENT: 4.7 % (ref 0.7–7)
GFR NON-AFRICAN AMERICAN: >60
GLOBULIN: 2.6 G/DL
GLUCOSE BLD-MCNC: 81 MG/DL (ref 74–106)
HCT VFR BLD CALC: 42.9 % (ref 40.1–51)
HEMOGLOBIN: 14.4 G/DL (ref 13.7–17.5)
LYMPHOCYTES ABSOLUTE: 2.16 K/UL (ref 1.18–3.74)
LYMPHOCYTES RELATIVE PERCENT: 32.8 % (ref 19.3–53.1)
MCH RBC QN AUTO: 33 PG (ref 25.7–32.2)
MCHC RBC AUTO-ENTMCNC: 33.6 G/DL (ref 32.3–36.5)
MCV RBC AUTO: 98.2 FL (ref 79–92.2)
MONOCYTES ABSOLUTE: 0.61 K/UL (ref 0.24–0.82)
MONOCYTES RELATIVE PERCENT: 9.3 % (ref 4.7–12.5)
NEUTROPHILS ABSOLUTE: 3.45 K/UL (ref 1.56–6.13)
NEUTROPHILS RELATIVE PERCENT: 52.4 % (ref 34–71.1)
PDW BLD-RTO: 12.8 % (ref 11.6–14.4)
PLATELET # BLD: 186 K/UL (ref 163–337)
PMV BLD AUTO: 9.7 FL (ref 7.4–10.4)
POTASSIUM SERPL-SCNC: 4.4 MMOL/L (ref 3.5–5.1)
RBC # BLD: 4.37 M/UL (ref 4.63–6.08)
SODIUM BLD-SCNC: 143 MMOL/L (ref 137–145)
TOTAL PROTEIN: 7.3 G/DL (ref 6.3–8.2)
WBC # BLD: 6.58 K/UL (ref 4.23–9.07)

## 2020-10-12 PROCEDURE — 80053 COMPREHEN METABOLIC PANEL: CPT

## 2020-10-12 PROCEDURE — 99213 OFFICE O/P EST LOW 20 MIN: CPT | Performed by: NURSE PRACTITIONER

## 2020-10-12 PROCEDURE — 85025 COMPLETE CBC W/AUTO DIFF WBC: CPT

## 2020-10-12 PROCEDURE — 71046 X-RAY EXAM CHEST 2 VIEWS: CPT

## 2020-10-12 PROCEDURE — 99212 OFFICE O/P EST SF 10 MIN: CPT

## 2020-10-12 ASSESSMENT — ENCOUNTER SYMPTOMS
EYE PAIN: 0
BLOOD IN STOOL: 0
GASTROINTESTINAL NEGATIVE: 1
WHEEZING: 0
EYE REDNESS: 0
CONSTIPATION: 0
BACK PAIN: 0
COUGH: 0
SORE THROAT: 0
DIARRHEA: 0
EYES NEGATIVE: 1
ABDOMINAL PAIN: 0
RESPIRATORY NEGATIVE: 1
VOMITING: 0
EYE DISCHARGE: 0
NAUSEA: 0
SHORTNESS OF BREATH: 0

## 2020-10-12 NOTE — PROGRESS NOTES
Progress Note      Pt Name: Awilda Elias: 1990  MRN: [id number]    Date of evaluation: 10/12/2020  History Obtained From:  patient, electronic medical record    CHIEF COMPLAINT:    Chief Complaint   Patient presents with    Follow-up     Cancer of descended right testis ([address]      HISTORY OF PRESENT ILLNESS:    Halima Fuentes is a 27 y.o.  male with significant PMH Pure Seminoma, stage IIb and is status post curative treatment with right orchiectomy and completed 4 cycles of adjuvant chemotherapy with etoposide and cisplatin on 4/17/2020. He returns today in scheduled 2-month follow-up for evaluation, late side effect monitoring, lab monitoring and review of CT scan of the abdomen pelvis results. Lanre Millan presents with no new complaints indicating that overall he is doing well. He continues to experience some generalized numbness and tingling in his upper and lower extremities suspected to be mild polyneuropathy, no significant change from previous evaluation. Lanre Millan denies any urinary complaints or other concerns. CBC today is within normal limits as documented below. Reviewed the CT scan of the abdomen and pelvis with contrast from 10/1/2020 documented mild left inguinal lymphadenopathy that is similar to previous study, largest lymph node measuring 1.4 cm. Known aortocaval lymph node measures 1.2 x 1.8 cm, no significant change. Borderline prominent spleen measuring 14.1 cm, may have slightly increased from previous study. 4 mm nonobstructing left renal calculus. Small fat-containing left inguinal hernia and a tiny fat-containing umbilical hernia. Lanre Millan denied any abdominal discomfort or left inguinal discomfort from small hernias. Encouraged him to monitor closely and prevent straining. Recommendation would be referral to general surgeon for evaluation if he became symptomatic.     ONCOLOGIC HISTORY:     Diagnosis  · Pure seminoma, February 2020  · [id number], stage IIB  · Good risk     Treatment summary  · 2/4/2020- right orchiectomy  · 2/10/2020 through 4/17/2020-4 cycles etoposide/cisplatin      Mr. Shira Leggett was first seen by Dr. Mela Vásquez on 2/5/2020. He presented to the ER department with complaints of back pain on 2/4/2020. A CT of the abdomen revealed a retroperitoneal mass. Further examination also revealed a right testicular mass. Urology was consulted and performed a right orchiectomy on 2/4/2020 consistent with pure seminoma. · 2/3/2020- CT of the abdomen showed 5 cm retroperitoneal lymph node mass within the upper abdomen in the aortocaval location. No liver metastasis. · 2/3/2020-CT of the chest showed no evidence of intrathoracic metastatic disease. · 2/4/2020-right orchiectomy by Dr. Sangita Suarez at [facility] consistent with pure seminoma. Tumor measuring 2.5 cm and confined to the testis. Spermatic cord margin negative. Final pathologic staging [id number], S0 stage IIB  · 2/5/2020-recommended 4 cycles of etoposide 100 mg/m2 days 1-5 and cisplatin 20mg/m2 days 1-5 × 4 cycles q. 21 days  · 2/10/2020 LDH-229, AFP- 2.4, Beta HCG- <1  · 6/93/2482- complicated neutropenia with oral infection. Recommend to continue with amoxicillin. Will add Neupogen. We will also add Neulasta to cycle #2. · 3/14/2020-CT abdomen pelvis showed  No acute process in the abdomen or pelvis. Prior right orchiectomy. Decreasing retroperitoneal/aortocaval adenopathy. No new or worsening adenopathy identified. 2 mm nonobstructing left renal stone. No obstructing stones identified. Bilateral L4 spondylolysis without spondylolisthesis. · 4/17/2020-completion of 4 cycles of cisplatin/etoposide  · 8/5/2020 CT C/A/P- No CT evidence of metastatic disease in the chest.  No acute cardiopulmonary process. Stable CT appearance the abdomen and pelvis without evidence of progression of neoplastic disease.  Mildly prominent interaortocaval lymph node measuring 2.0 x 1.1 cm again identified, unchanged. · 8/13/2020-labs were all within normal limits with AFP 2.8, beta hCG <1 and   · 10/1/2020-CT scan of the abdomen and pelvis with contrast documented mild left inguinal lymphadenopathy that is similar to previous study, largest lymph node measuring 1.4 cm. Known aortocaval lymph node measures 1.2 x 1.8 cm, no significant change. Borderline prominent spleen measuring 14.1 cm, may have slightly increased from previous study. 4 mm nonobstructing left renal calculus. Small fat-containing left inguinal hernia and a tiny fat-containing umbilical hernia  · 78/56/9545-[id number] x-ray with no acute cardiopulmonary findings.     NCCN guidelines       Past Medical History:    Past Medical History:   Diagnosis Date    Back pain     Cancer ([address]     testicular    Hypertension     Lumbar stress fracture        Past Surgical History:    Past Surgical History:   Procedure Laterality Date    TESTICLE REMOVAL Right 2/4/2020    RIGHT RADICAL ORCHIECTOMY performed by Vish Dunaway MD at [facility]       Current Medications:    Current Outpatient Medications   Medication Sig Dispense Refill    cyclobenzaprine (FLEXERIL) 10 MG tablet TK 1 T PO TID      DUPIXENT 300 MG/2ML SOSY injection       nortriptyline (PAMELOR) 10 MG capsule       albuterol sulfate  (90 Base) MCG/ACT inhaler USE 2 PUFFS Q 4 H PRN      HYDROcodone-acetaminophen (NORCO)  MG per tablet Take 1 tablet by mouth every 6 hours as needed for Pain.  lisinopril (PRINIVIL;ZESTRIL) 10 MG tablet Take 10 mg by mouth daily      montelukast (SINGULAIR) 10 MG tablet Take 10 mg by mouth nightly       No current facility-administered medications for this visit. Allergies:    Allergies   Allergen Reactions    Nuts [Peanut-Containing Drug Products]     Other      peanuts       Social History:    Social History     Tobacco Use    Smoking status: Former Smoker     Packs/day: 0.50    Smokeless tobacco: Never Used   Substance Use Topics    Alcohol use: No    Drug use: No       Family History:   Family History   Problem Relation Age of Onset    High Blood Pressure Mother     Other Mother         Kidney Stones       Vitals:  Vitals:    10/12/20 1045   BP: (!) 154/86   Pulse: 81   Temp: 97.5 °F (36.4 °C)   TempSrc: Temporal   SpO2: 96%   Weight: 263 lb 4.8 oz (119.4 kg)   Height: 5' 11\" (1.803 m)        Subjective   REVIEW OF SYSTEMS:   Review of Systems   Constitutional: Negative. Negative for chills, diaphoresis and fever. HENT: Negative. Negative for congestion, ear pain, hearing loss, nosebleeds, sore throat and tinnitus. Eyes: Negative. Negative for pain, discharge and redness. Respiratory: Negative. Negative for cough, shortness of breath and wheezing. Cardiovascular: Negative. Negative for chest pain, palpitations and leg swelling. Gastrointestinal: Negative. Negative for abdominal pain, blood in stool, constipation, diarrhea, nausea and vomiting. Endocrine: Negative for polydipsia. Genitourinary: Negative for dysuria, flank pain, frequency, hematuria and urgency. Musculoskeletal: Negative. Negative for back pain, myalgias and neck pain. Skin: Negative. Negative for rash. Neurological: Negative. Negative for dizziness, tremors, seizures, weakness and headaches. Hematological: Does not bruise/bleed easily. Psychiatric/Behavioral: Negative. The patient is not nervous/anxious. Objective   PHYSICAL EXAM:  Physical Exam  Vitals signs reviewed. Constitutional:       General: He is not in acute distress. Appearance: He is well-developed. He is not diaphoretic. HENT:      Head: Normocephalic and atraumatic. Mouth/Throat:      Pharynx: Uvula midline. Tonsils: No tonsillar exudate. Eyes:      General: Lids are normal. No scleral icterus. Right eye: No discharge. Left eye: No discharge.       Conjunctiva/sclera: Conjunctivae normal.      Pupils: Pupils are equal, round, and reactive to light. Neck:      Musculoskeletal: Normal range of motion and neck supple. Thyroid: No thyroid mass or thyromegaly. Vascular: No JVD. Trachea: Trachea normal. No tracheal deviation. Cardiovascular:      Rate and Rhythm: Normal rate and regular rhythm. Heart sounds: Normal heart sounds. No murmur. No friction rub. No gallop. Pulmonary:      Effort: Pulmonary effort is normal. No respiratory distress. Breath sounds: Normal breath sounds. No wheezing or rales. Chest:      Chest wall: No tenderness. Abdominal:      General: Bowel sounds are normal. There is no distension. Palpations: Abdomen is soft. There is no mass. Tenderness: There is no abdominal tenderness. There is no guarding or rebound. Hernia: No hernia is present. Musculoskeletal:         General: No tenderness or deformity. Comments: Range of motion within normal limits x4 extremities   Skin:     General: Skin is warm. Coloration: Skin is not pale. Findings: No erythema or rash. Neurological:      Mental Status: He is alert and oriented to person, place, and time. Cranial Nerves: No cranial nerve deficit. Coordination: Coordination normal.   Psychiatric:         Behavior: Behavior normal.         Thought Content: Thought content normal.         Labs:  CBC:   Recent Labs     10/12/20  1055   WBC 6.58   HGB 14.4        CMP:   Recent Labs     10/12/20  1139   GLUCOSE 81   BUN 14   CREATININE 0.7   CO2 29   CALCIUM 9.4   ALKPHOS 102   AST 22   ALT 20*     Hepatic:   Recent Labs     10/12/20  1139   AST 22   ALT 20*   BILITOT 1.0   ALKPHOS 102     Troponin: No results for input(s): TROPONINI in the last 72 hours. BNP: No results for input(s): BNP in the last 72 hours. Lipids: No results for input(s): CHOL, HDL in the last 72 hours.     Invalid input(s): LDL  INR: No results for input(s): INR in the last 72 hours. ABG: No results for input(s): PHART, [id number], PO2ART, POD1CTR, I2RTBKNM, BEART in the last 72 hours. 30 Day lookback of cultures:    Blood Culture Recent: No results for input(s): BC in the last 720 hours. Gram Stain Recent: No results for input(s): LABGRAM in the last 720 hours. Resp Culture Recent: No results for input(s): CULTRESP in the last 720 hours. Body Fluid Recent : No results for input(s): BFCX in the last 720 hours. MRSA Recent : No results for input(s): [facility] in the last 720 hours. Urine Culture Recent : No results for input(s): LABURIN in the last 720 hours. Organism Recent : No results for input(s): ORG in the last 720 hours. ASSESSMENT/PLAN:      1. Seminoma of descended right testis (HCC, )Pure Seminoma, stage IIb and is status post curative treatment with right orchiectomy and completed 4 cycles of adjuvant chemotherapy with etoposide and cisplatin on 4/17/2020. He has normal tumor markers and has continued to have retroperitoneal lymphadenopathy measuring less than 3 cm. Labs on 8/13/2020 AFP 2.8, beta hCG <1 and     CT scan of the abdomen and pelvis with contrast from 10/1/2020 documented mild left inguinal lymphadenopathy that is similar to previous study, largest lymph node measuring 1.4 cm. Known aortocaval lymph node measures 1.2 x 1.8 cm, no significant change. Borderline prominent spleen measuring 14.1 cm, may have slightly increased from previous study. 4 mm nonobstructing left renal calculus. Small fat-containing left inguinal hernia and a tiny fat-containing umbilical hernia. Mary Beth Kimball denied any abdominal discomfort or left inguinal discomfort from small hernias. Encouraged him to monitor closely and prevent straining.   Recommendation would be referral to general surgeon for evaluation if he became symptomatic.    -CMP, LDH, AFP and beta-hCG today and every 2 months for year 1  -Chest x-ray for every 2 months monitoring will be completed today  -CT scan of the abdomen and pelvis will be due in February 2021    Chest x-ray results from today, 10/12/2020, were reviewed after visit. Chest x-ray was without any acute cardiopulmonary process. 2. Care plan discussed with patient, discussed importance of monitoring. Reviewed NCCN guidelines for close monitoring/surveillance    3. Toxicity, late effect, due to drug, medicine, or biological substance: Numbness and tingling of upper and lower extremities of both sides. Reports symptoms are overall controlled with mild numbness and tingling to upper and lower extremities. 4.  Nicotine abuse, smokes less than 1 pack of cigarettes daily since the age of 12  We talked about the importance of quitting smoking for approximately 4-5 minutes. Specifically, we discussed the risk related tobacco including but not limited to carcinoma, cardiovascular disease, stroke, and financial loss. I advised to quit smoking and offered resources to include nicotine patches to help with the craving. The patient is contemplated at this time. I will follow-up on smoking cessation during the next visit and continue to encourage quitting tobacco use. FOLLOW UP:  1. Follow-up appointment given for 2 months, sooner if needed  2. Chest x-ray today and every 2 months  3. Follow-up with other medical providers as recommended    Over 50% of the total visit time of 15 minutes in face to face encounter with the patient, out of which more than 50% of the time was spent in counseling patient  and coordination of care. Counseling included but was not limited to time spent reviewing labs, imaging studies/ treatment plan and answering questions. This does not include charting. Discussed precautions related to [address] and being at increased risk. Discussed proper handwashing to be done frequently, limit exposure to other individuals and maintain social distancing of 6 feet.   Recommend contacting primary care provider if having respiratory symptoms for further recommendations and consideration for testing.     (Please note that portions of this note were completed with a voice recognition program. Efforts were made to edit the dictations but occasionally words are mis-transcribed.)      Electronically signed by AMBAR Lawrence on 10/12/2020 at 1:25 PM

## 2020-12-14 ENCOUNTER — HOSPITAL ENCOUNTER (OUTPATIENT)
Dept: INFUSION THERAPY | Age: 30
Discharge: HOME | End: 2020-12-14
Payer: COMMERCIAL

## 2020-12-14 ENCOUNTER — HOSPITAL ENCOUNTER (OUTPATIENT)
Dept: GENERAL RADIOLOGY | Age: 30
Discharge: HOME | End: 2020-12-14
Payer: COMMERCIAL

## 2020-12-14 ENCOUNTER — OFFICE VISIT (OUTPATIENT)
Dept: HEMATOLOGY | Age: 30
End: 2020-12-14
Payer: COMMERCIAL

## 2020-12-14 VITALS
OXYGEN SATURATION: 98 % | BODY MASS INDEX: 37.81 KG/M2 | HEIGHT: 71 IN | HEART RATE: 97 BPM | DIASTOLIC BLOOD PRESSURE: 94 MMHG | WEIGHT: 270.1 LBS | TEMPERATURE: 96.8 F | SYSTOLIC BLOOD PRESSURE: 142 MMHG

## 2020-12-14 DIAGNOSIS — C62.11 SEMINOMA OF DESCENDED RIGHT TESTIS (HCC): ICD-10-CM

## 2020-12-14 LAB
ALBUMIN SERPL-MCNC: 4.4 G/DL (ref 3.5–5.2)
ALP BLD-CCNC: 108 U/L (ref 40–130)
ALT SERPL-CCNC: 24 U/L (ref 21–72)
ANION GAP SERPL CALCULATED.3IONS-SCNC: 14 MMOL/L (ref 7–19)
AST SERPL-CCNC: 27 U/L (ref 17–59)
BASOPHILS ABSOLUTE: 0.12 K/UL (ref 0.01–0.08)
BASOPHILS RELATIVE PERCENT: 1.6 % (ref 0.1–1.2)
BILIRUB SERPL-MCNC: 0.9 MG/DL (ref 0.2–1.3)
BUN BLDV-MCNC: 12 MG/DL (ref 9–20)
CALCIUM SERPL-MCNC: 10 MG/DL (ref 8.4–10.2)
CHLORIDE BLD-SCNC: 103 MMOL/L (ref 98–111)
CO2: 27 MMOL/L (ref 22–29)
CREAT SERPL-MCNC: 0.7 MG/DL (ref 0.6–1.2)
EOSINOPHILS ABSOLUTE: 0.44 K/UL (ref 0.04–0.54)
EOSINOPHILS RELATIVE PERCENT: 5.8 % (ref 0.7–7)
GFR NON-AFRICAN AMERICAN: >60
GLOBULIN: 3 G/DL
GLUCOSE BLD-MCNC: 91 MG/DL (ref 74–106)
HCT VFR BLD CALC: 45.2 % (ref 40.1–51)
HEMOGLOBIN: 16 G/DL (ref 13.7–17.5)
LACTATE DEHYDROGENASE: 478 U/L (ref 313–618)
LYMPHOCYTES ABSOLUTE: 2.22 K/UL (ref 1.18–3.74)
LYMPHOCYTES RELATIVE PERCENT: 29.2 % (ref 19.3–53.1)
MCH RBC QN AUTO: 33.3 PG (ref 25.7–32.2)
MCHC RBC AUTO-ENTMCNC: 35.4 G/DL (ref 32.3–36.5)
MCV RBC AUTO: 94 FL (ref 79–92.2)
MONOCYTES ABSOLUTE: 0.77 K/UL (ref 0.24–0.82)
MONOCYTES RELATIVE PERCENT: 10.1 % (ref 4.7–12.5)
NEUTROPHILS ABSOLUTE: 4.04 K/UL (ref 1.56–6.13)
NEUTROPHILS RELATIVE PERCENT: 53.3 % (ref 34–71.1)
PDW BLD-RTO: 11.6 % (ref 11.6–14.4)
PLATELET # BLD: 181 K/UL (ref 163–337)
PMV BLD AUTO: 10 FL (ref 7.4–10.4)
POTASSIUM SERPL-SCNC: 4.3 MMOL/L (ref 3.5–5.1)
RBC # BLD: 4.81 M/UL (ref 4.63–6.08)
SODIUM BLD-SCNC: 144 MMOL/L (ref 137–145)
TOTAL PROTEIN: 7.4 G/DL (ref 6.3–8.2)
WBC # BLD: 7.59 K/UL (ref 4.23–9.07)

## 2020-12-14 PROCEDURE — 71046 X-RAY EXAM CHEST 2 VIEWS: CPT

## 2020-12-14 PROCEDURE — 80053 COMPREHEN METABOLIC PANEL: CPT

## 2020-12-14 PROCEDURE — 85025 COMPLETE CBC W/AUTO DIFF WBC: CPT

## 2020-12-14 PROCEDURE — 99211 OFF/OP EST MAY X REQ PHY/QHP: CPT

## 2020-12-14 PROCEDURE — 83615 LACTATE (LD) (LDH) ENZYME: CPT

## 2020-12-14 PROCEDURE — 99212 OFFICE O/P EST SF 10 MIN: CPT | Performed by: NURSE PRACTITIONER

## 2020-12-14 RX ORDER — ASPIRIN 325 MG
TABLET ORAL
COMMUNITY
Start: 2020-10-30 | End: 2021-12-29

## 2020-12-14 RX ORDER — OXYCODONE AND ACETAMINOPHEN 10; 325 MG/1; MG/1
TABLET ORAL
COMMUNITY
Start: 2020-10-30 | End: 2020-12-14 | Stop reason: ALTCHOICE

## 2020-12-14 RX ORDER — NALOXONE HYDROCHLORIDE 4 MG/.1ML
SPRAY NASAL
COMMUNITY
Start: 2020-10-30 | End: 2020-12-14 | Stop reason: ALTCHOICE

## 2020-12-14 RX ORDER — SULFAMETHOXAZOLE AND TRIMETHOPRIM 800; 160 MG/1; MG/1
1 TABLET ORAL 2 TIMES DAILY
COMMUNITY
End: 2021-12-29

## 2020-12-14 ASSESSMENT — ENCOUNTER SYMPTOMS
EYE REDNESS: 0
EYES NEGATIVE: 1
EYE DISCHARGE: 0
VOMITING: 0
RESPIRATORY NEGATIVE: 1
GASTROINTESTINAL NEGATIVE: 1
CONSTIPATION: 0
WHEEZING: 0
ABDOMINAL PAIN: 0
SORE THROAT: 0
BACK PAIN: 0
SHORTNESS OF BREATH: 0
EYE PAIN: 0
COUGH: 0
NAUSEA: 0
BLOOD IN STOOL: 0
DIARRHEA: 0

## 2020-12-14 NOTE — PROGRESS NOTES
Progress Note      Pt Name: Anamika Del Rosario: 1990  MRN: [id number]    Date of evaluation: 12/14/2020  History Obtained From:  patient, electronic medical record    CHIEF COMPLAINT:    Chief Complaint   Patient presents with    Follow-up     Seminoma of descended right testis [facility])    Other     HISTORY OF PRESENT ILLNESS:    Sarah Gallardo is a 27 y.o.  male with significant PMH Pure Seminoma, stage IIb and is status post curative treatment with right orchiectomy and completed 4 cycles of adjuvant chemotherapy with etoposide and cisplatin on 4/17/2020. He returns today in scheduled follow-up for evaluation, late side effect monitoring, lab monitoring and appropriate radiographic monitoring. Brent Byers presents today with no new complaints other than experiencing a work injury on 10/29/2020, he fell off the roof and crushed his left ankle. He is currently in a walking boot. Daquan Bryant is completing his first year of surveillance, his next CT scan of the abdomen pelvis will be due on Tuesday, 1/20/2021 and he will have a chest x-ray completed today. Denies any urinary complaints, abdominal pain or left inguinal discomfort from small hernias  He continues to have some mild polyneuropathy with numbness and tingling in his upper and lower extremities reports no significant change from previous evaluation. CBC today is within normal limits as documented below      ONCOLOGIC HISTORY:     Diagnosis  · Pure seminoma, February 2020  · [id number], stage IIB  · Good risk     Treatment summary  · 2/4/2020 right orchiectomy  · 2/10/2020 through 4/17/20204 cycles etoposide/cisplatin      Mr. Elen Mcknight was first seen by Dr. Rk Goldstein on 2/5/2020. He presented to the ER department with complaints of back pain on 2/4/2020. A CT of the abdomen revealed a retroperitoneal mass. Further examination also revealed a right testicular mass.   Urology was consulted and performed a right orchiectomy on 2/4/2020 consistent with pure seminoma. · 2/3/2020 CT of the abdomen showed 5 cm retroperitoneal lymph node mass within the upper abdomen in the aortocaval location. No liver metastasis. · 2/3/2020CT of the chest showed no evidence of intrathoracic metastatic disease. · 2/4/2020right orchiectomy by Dr. Giorgio Hung at [facility] consistent with pure seminoma. Tumor measuring 2.5 cm and confined to the testis. Spermatic cord margin negative. Final pathologic staging [id number], S0 stage IIB  · 2/5/2020recommended 4 cycles of etoposide 100 mg/m2 days 15 and cisplatin 20mg/m2 days 15 × 4 cycles q. 21 days  · 2/10/2020 LDH-229, AFP- 2.4, Beta HCG- <1  · 6/37/7121 complicated neutropenia with oral infection. Recommend to continue with amoxicillin. Will add Neupogen. We will also add Neulasta to cycle #2. · 3/14/2020-CT abdomen pelvis showed  No acute process in the abdomen or pelvis. Prior right orchiectomy. Decreasing retroperitoneal/aortocaval adenopathy. No new or worsening adenopathy identified. 2 mm nonobstructing left renal stone. No obstructing stones identified. Bilateral L4 spondylolysis without spondylolisthesis. · 4/17/2020completion of 4 cycles of cisplatin/etoposide  · 8/5/2020 CT C/A/P- No CT evidence of metastatic disease in the chest.  No acute cardiopulmonary process. Stable CT appearance the abdomen and pelvis without evidence of progression of neoplastic disease. Mildly prominent interaortocaval lymph node measuring 2.0 x 1.1 cm again identified, unchanged. · 8/13/2020-labs were all within normal limits with AFP 2.8, beta hCG <1 and   · 10/1/2020-CT scan of the abdomen and pelvis with contrast documented mild left inguinal lymphadenopathy that is similar to previous study, largest lymph node measuring 1.4 cm. Known aortocaval lymph node measures 1.2 x 1.8 cm, no significant change.   Borderline prominent spleen measuring 14.1 cm, may have slightly increased from previous study. 4 mm nonobstructing left renal calculus. Small fat-containing left inguinal hernia and a tiny fat-containing umbilical hernia  · 10/79/5494-OUFCK x-ray with no acute cardiopulmonary findings and Tumor markers within normal limits, , AFP 2.6 and beta hCG <1.   · 12/14/2020 -chest x-ray documented no acute cardiopulmonary disease.     NCCN guidelines       Past Medical History:    Past Medical History:   Diagnosis Date    Back pain     Cancer ([address]     testicular    Hypertension     Lumbar stress fracture        Past Surgical History:    Past Surgical History:   Procedure Laterality Date    TESTICLE REMOVAL Right 2/4/2020    RIGHT RADICAL ORCHIECTOMY performed by Anjel Radford [facility]       Current Medications:    Current Outpatient Medications   Medication Sig Dispense Refill    aspirin 325 MG tablet TK 1 T PO QD      sulfamethoxazole-trimethoprim (BACTRIM DS) 800-160 MG per tablet Take 1 tablet by mouth 2 times daily      cyclobenzaprine (FLEXERIL) 10 MG tablet TK 1 T PO TID      DUPIXENT 300 MG/2ML SOSY injection       nortriptyline (PAMELOR) 10 MG capsule       albuterol sulfate  (90 Base) MCG/ACT inhaler USE 2 PUFFS Q 4 H PRN      HYDROcodone-acetaminophen (NORCO)  MG per tablet Take 1 tablet by mouth every 6 hours as needed for Pain.  lisinopril (PRINIVIL;ZESTRIL) 10 MG tablet Take 10 mg by mouth daily      montelukast (SINGULAIR) 10 MG tablet Take 10 mg by mouth nightly       No current facility-administered medications for this visit. Allergies:    Allergies   Allergen Reactions    Nuts [Peanut-Containing Drug Products]     Other      peanuts       Social History:    Social History     Tobacco Use    Smoking status: Former Smoker     Packs/day: 0.50    Smokeless tobacco: Never Used   Substance Use Topics    Alcohol use: No    Drug use: No       Family History:   Family History   Problem Relation Age of Onset    High Blood Pressure Mother     Other Mother         Kidney Stones       Vitals:  Vitals:    12/14/20 1044   BP: (!) 142/94   Pulse: 97   Temp: 96.8 °F (36 °C)   TempSrc: Temporal   SpO2: 98%   Weight: 270 lb 1.6 oz (122.5 kg)   Height: 5' 11\" (1.803 m)        Subjective   REVIEW OF SYSTEMS:   Review of Systems   Constitutional: Negative. Negative for chills, diaphoresis and fever. HENT: Negative. Negative for congestion, ear pain, hearing loss, nosebleeds, sore throat and tinnitus. Eyes: Negative. Negative for pain, discharge and redness. Respiratory: Negative. Negative for cough, shortness of breath and wheezing. Cardiovascular: Negative. Negative for chest pain, palpitations and leg swelling. Gastrointestinal: Negative. Negative for abdominal pain, blood in stool, constipation, diarrhea, nausea and vomiting. Endocrine: Negative for polydipsia. Genitourinary: Negative for dysuria, flank pain, frequency, hematuria and urgency. Musculoskeletal: Negative. Negative for back pain, myalgias and neck pain. Left ankle discomfort secondary to fracture and in a walking boot   Skin: Negative. Negative for rash. Neurological: Positive for numbness (Secondary to chemotherapy and stable to hands and feet). Negative for dizziness, tremors, seizures, weakness and headaches. Hematological: Does not bruise/bleed easily. Psychiatric/Behavioral: Negative. The patient is not nervous/anxious. Objective   PHYSICAL EXAM:  Physical Exam  Vitals signs reviewed. Constitutional:       General: He is not in acute distress. Appearance: He is well-developed. He is not diaphoretic. HENT:      Head: Normocephalic and atraumatic. Mouth/Throat:      Pharynx: Uvula midline. Tonsils: No tonsillar exudate. Eyes:      General: Lids are normal. No scleral icterus. Right eye: No discharge. Left eye: No discharge.       Conjunctiva/sclera: Conjunctivae normal.      Pupils: Pupils are equal, round, and reactive to light. Neck:      Musculoskeletal: Normal range of motion and neck supple. Thyroid: No thyroid mass or thyromegaly. Vascular: No JVD. Trachea: Trachea normal. No tracheal deviation. Cardiovascular:      Rate and Rhythm: Normal rate and regular rhythm. Heart sounds: Normal heart sounds. No murmur. No friction rub. No gallop. Pulmonary:      Effort: Pulmonary effort is normal. No respiratory distress. Breath sounds: Normal breath sounds. No wheezing or rales. Chest:      Chest wall: No tenderness. Abdominal:      General: Bowel sounds are normal. There is no distension. Palpations: Abdomen is soft. There is no mass. Tenderness: There is no abdominal tenderness. There is no guarding or rebound. Hernia: No hernia is present. Musculoskeletal:         General: No tenderness or deformity. Comments: Range of motion within normal limits x3 extremities    Left ankle in a walking boot     Skin:     General: Skin is warm. Coloration: Skin is not pale. Findings: No erythema or rash. Neurological:      Mental Status: He is alert and oriented to person, place, and time. Cranial Nerves: No cranial nerve deficit. Coordination: Coordination normal.   Psychiatric:         Behavior: Behavior normal.         Thought Content: Thought content normal.         Labs:  Lab Results   Component Value Date    WBC 7.59 12/14/2020    HGB 16.0 12/14/2020    HCT 45.2 12/14/2020    MCV 94.0 (H) 12/14/2020     12/14/2020     Lab Results   Component Value Date    NEUTROABS 4.04 12/14/2020       ASSESSMENT/PLAN:      1. Seminoma of descended right testis (HCC, )Pure Seminoma, stage IIb and is status post curative treatment with right orchiectomy and completed 4 cycles of adjuvant chemotherapy with etoposide and cisplatin on 4/17/2020.     Tumor markers on 10/12/2020 remain within normal limits, , AFP 2.6 and beta hCG <1.     CT scan of the abdomen and pelvis with contrast from 10/1/2020 documented mild left inguinal lymphadenopathy that is similar to previous study, largest lymph node measuring 1.4 cm. Known aortocaval lymph node measures 1.2 x 1.8 cm, no significant change. Borderline prominent spleen measuring 14.1 cm, may have slightly increased from previous study. 4 mm nonobstructing left renal calculus. Small fat-containing left inguinal hernia and a tiny fat-containing umbilical hernia. [place] remains asymptomatic denying any abdominal discomfort or inguinal left discomfort from small hernias. A referral can be made to general surgery for evaluation of future if needed. -CMP, LDH, AFP and beta-hCG today and then will begin every 3-month monitoring at return appointment in February 2021  -Chest x-ray today and then will begin 3-month monitoring at return appointment in February 2021  -CT scan of the abdomen and pelvis will be due in February 2021    Chest x-ray results from 12/14/2020 documented no acute cardiopulmonary disease. 2. Care plan discussed with patient, discussed importance of monitoring. Again today reviewed [place] guidelines for close monitoring and surveillance. 3. Toxicity, late effect, due to drug, medicine, or biological substance: Numbness and tingling to upper and lower extremities on both sides, hands and feet. No significant change from previous evaluation. 4.  Nicotine abuse, currently smoking <1/2 pack cigarettes daily   We talked about the importance of quitting smoking for approximately 4-5 minutes. Specifically, we discussed the risk related tobacco including but not limited to carcinoma, cardiovascular disease, stroke, and financial loss. I advised to quit smoking and offered resources to include nicotine patches to help with the craving. The patient is contemplated at this time.  I will follow-up on smoking cessation during the next visit and continue to encourage quitting tobacco

## 2021-02-01 ENCOUNTER — HOSPITAL ENCOUNTER (OUTPATIENT)
Dept: CT IMAGING | Age: 31
Discharge: HOME | End: 2021-02-01
Payer: COMMERCIAL

## 2021-02-01 DIAGNOSIS — C62.11 SEMINOMA OF DESCENDED RIGHT TESTIS (HCC): ICD-10-CM

## 2021-02-01 PROCEDURE — 74177 CT ABD & PELVIS W/CONTRAST: CPT

## 2021-02-01 PROCEDURE — 6360000004 HC RX CONTRAST MEDICATION: Performed by: NURSE PRACTITIONER

## 2021-02-01 RX ADMIN — IOPAMIDOL 75 ML: 755 INJECTION, SOLUTION INTRAVENOUS at 08:14

## 2021-02-19 ENCOUNTER — HOSPITAL ENCOUNTER (OUTPATIENT)
Dept: INFUSION THERAPY | Age: 31
End: 2021-02-19
Payer: COMMERCIAL

## 2021-03-19 ENCOUNTER — HOSPITAL ENCOUNTER (OUTPATIENT)
Dept: GENERAL RADIOLOGY | Age: 31
Discharge: HOME | End: 2021-03-19
Payer: COMMERCIAL

## 2021-03-19 ENCOUNTER — OFFICE VISIT (OUTPATIENT)
Dept: HEMATOLOGY | Age: 31
End: 2021-03-19
Payer: COMMERCIAL

## 2021-03-19 ENCOUNTER — HOSPITAL ENCOUNTER (OUTPATIENT)
Dept: INFUSION THERAPY | Age: 31
Discharge: HOME | End: 2021-03-19
Payer: COMMERCIAL

## 2021-03-19 VITALS
SYSTOLIC BLOOD PRESSURE: 142 MMHG | WEIGHT: 281.7 LBS | HEART RATE: 79 BPM | OXYGEN SATURATION: 97 % | BODY MASS INDEX: 39.44 KG/M2 | DIASTOLIC BLOOD PRESSURE: 90 MMHG | TEMPERATURE: 96.9 F | HEIGHT: 71 IN

## 2021-03-19 DIAGNOSIS — T50.905S TOXICITY, LATE EFFECT, DUE TO DRUG, MEDICINE, OR BIOLOGICAL SUBSTANCE: ICD-10-CM

## 2021-03-19 DIAGNOSIS — Z71.89 CARE PLAN DISCUSSED WITH PATIENT: ICD-10-CM

## 2021-03-19 DIAGNOSIS — T45.1X5D ADVERSE EFFECT OF CHEMOTHERAPY, SUBSEQUENT ENCOUNTER: ICD-10-CM

## 2021-03-19 DIAGNOSIS — C62.11 SEMINOMA OF DESCENDED RIGHT TESTIS (HCC): ICD-10-CM

## 2021-03-19 DIAGNOSIS — Z72.0 TOBACCO ABUSE: ICD-10-CM

## 2021-03-19 DIAGNOSIS — R20.0 NUMBNESS AND TINGLING OF UPPER AND LOWER EXTREMITIES OF BOTH SIDES: ICD-10-CM

## 2021-03-19 DIAGNOSIS — C62.11 SEMINOMA OF DESCENDED RIGHT TESTIS (HCC): Primary | ICD-10-CM

## 2021-03-19 DIAGNOSIS — R20.2 NUMBNESS AND TINGLING OF UPPER AND LOWER EXTREMITIES OF BOTH SIDES: ICD-10-CM

## 2021-03-19 LAB
BASOPHILS ABSOLUTE: 0.06 K/UL (ref 0.01–0.08)
BASOPHILS RELATIVE PERCENT: 0.8 % (ref 0.1–1.2)
EOSINOPHILS ABSOLUTE: 0.19 K/UL (ref 0.04–0.54)
EOSINOPHILS RELATIVE PERCENT: 2.6 % (ref 0.7–7)
HCT VFR BLD CALC: 44.5 % (ref 40.1–51)
HEMOGLOBIN: 15.6 G/DL (ref 13.7–17.5)
LYMPHOCYTES ABSOLUTE: 1.65 K/UL (ref 1.18–3.74)
LYMPHOCYTES RELATIVE PERCENT: 22.9 % (ref 19.3–53.1)
MCH RBC QN AUTO: 31.8 PG (ref 25.7–32.2)
MCHC RBC AUTO-ENTMCNC: 35.1 G/DL (ref 32.3–36.5)
MCV RBC AUTO: 90.8 FL (ref 79–92.2)
MONOCYTES ABSOLUTE: 0.69 K/UL (ref 0.24–0.82)
MONOCYTES RELATIVE PERCENT: 9.6 % (ref 4.7–12.5)
NEUTROPHILS ABSOLUTE: 4.6 K/UL (ref 1.56–6.13)
NEUTROPHILS RELATIVE PERCENT: 64.1 % (ref 34–71.1)
PDW BLD-RTO: 12.7 % (ref 11.6–14.4)
PLATELET # BLD: 185 K/UL (ref 163–337)
PMV BLD AUTO: 9.1 FL (ref 7.4–10.4)
RBC # BLD: 4.9 M/UL (ref 4.63–6.08)
WBC # BLD: 7.19 K/UL (ref 4.23–9.07)

## 2021-03-19 PROCEDURE — 99212 OFFICE O/P EST SF 10 MIN: CPT

## 2021-03-19 PROCEDURE — 99213 OFFICE O/P EST LOW 20 MIN: CPT | Performed by: NURSE PRACTITIONER

## 2021-03-19 PROCEDURE — 85025 COMPLETE CBC W/AUTO DIFF WBC: CPT

## 2021-03-19 PROCEDURE — 71046 X-RAY EXAM CHEST 2 VIEWS: CPT

## 2021-03-19 ASSESSMENT — ENCOUNTER SYMPTOMS
COUGH: 0
EYE REDNESS: 0
GASTROINTESTINAL NEGATIVE: 1
RESPIRATORY NEGATIVE: 1
BLOOD IN STOOL: 0
EYES NEGATIVE: 1
BACK PAIN: 0
SORE THROAT: 0
EYE DISCHARGE: 0
VOMITING: 0
WHEEZING: 0
SHORTNESS OF BREATH: 0
NAUSEA: 0
DIARRHEA: 0
ABDOMINAL PAIN: 0
EYE PAIN: 0
CONSTIPATION: 0

## 2021-03-19 NOTE — PROGRESS NOTES
retroperitoneal mass. Further examination also revealed a right testicular mass. Urology was consulted and performed a right orchiectomy on 2/4/2020 consistent with pure seminoma. · 2/3/2020 CT of the abdomen showed 5 cm retroperitoneal lymph node mass within the upper abdomen in the aortocaval location. No liver metastasis. · 2/3/2020CT of the chest showed no evidence of intrathoracic metastatic disease. · 2/4/2020right orchiectomy by Dr. Aundrea Ha at [facility] consistent with pure seminoma. Tumor measuring 2.5 cm and confined to the testis. Spermatic cord margin negative. Final pathologic staging [id number], S0 stage IIB  · 2/5/2020recommended 4 cycles of etoposide 100 mg/m2 days 15 and cisplatin 20mg/m2 days 15 × 4 cycles q. 21 days  · 2/10/2020 LDH-229, AFP- 2.4, Beta HCG- <1  · 8/46/1939 complicated neutropenia with oral infection. Recommend to continue with amoxicillin. Will add Neupogen. We will also add Neulasta to cycle #2. · 3/14/2020-CT abdomen pelvis showed  No acute process in the abdomen or pelvis. Prior right orchiectomy. Decreasing retroperitoneal/aortocaval adenopathy. No new or worsening adenopathy identified. 2 mm nonobstructing left renal stone. No obstructing stones identified. Bilateral L4 spondylolysis without spondylolisthesis. · 4/17/2020completion of 4 cycles of cisplatin/etoposide  · 8/5/2020 CT C/A/P- No CT evidence of metastatic disease in the chest.  No acute cardiopulmonary process. Stable CT appearance the abdomen and pelvis without evidence of progression of neoplastic disease. Mildly prominent interaortocaval lymph node measuring 2.0 x 1.1 cm again identified, unchanged. · 8/13/2020-labs were all within normal limits with AFP 2.8, beta hCG <1 and   · 10/1/2020-CT scan of the abdomen and pelvis with contrast documented mild left inguinal lymphadenopathy that is similar to previous study, largest lymph node measuring 1.4 cm.   Known aortocaval lymph node measures 1.2 x 1.8 cm, no significant change. Borderline prominent spleen measuring 14.1 cm, may have slightly increased from previous study. 4 mm nonobstructing left renal calculus. Small fat-containing left inguinal hernia and a tiny fat-containing umbilical hernia  · 71/30/8553-GPTVO x-ray with no acute cardiopulmonary findings and Tumor markers within normal limits, , AFP 2.6 and beta hCG <1.   · 12/14/2020 -chest x-ray documented no acute cardiopulmonary disease. · 12/14/2020 labs:, AFP 3.1, Beta HCG <1  · 2/21/2021-CT abdomen/pelvis with contrast revealed  Prior right orchiectomy. There is a solitary slightly prominent aortocaval node which is stable, and has been stable over the last several comparison exams. No new or increasing adenopathy. . Solitary 2 mm nonobstructing left renal stone.       NCCN guidelines         Past Medical History:    Past Medical History:   Diagnosis Date    Back pain     Cancer ([address]     testicular    Hypertension     Lumbar stress fracture        Past Surgical History:    Past Surgical History:   Procedure Laterality Date    TESTICLE REMOVAL Right 2/4/2020    RIGHT RADICAL ORCHIECTOMY performed by Chapin Will MD at [facility] OR       Current Medications:    Current Outpatient Medications   Medication Sig Dispense Refill    cyclobenzaprine (FLEXERIL) 10 MG tablet TK 1 T PO TID      DUPIXENT 300 MG/2ML SOSY injection       albuterol sulfate  (90 Base) MCG/ACT inhaler USE 2 PUFFS Q 4 H PRN      HYDROcodone-acetaminophen (NORCO)  MG per tablet Take 1 tablet by mouth every 6 hours as needed for Pain.       montelukast (SINGULAIR) 10 MG tablet Take 10 mg by mouth nightly      aspirin 325 MG tablet TK 1 T PO QD      sulfamethoxazole-trimethoprim (BACTRIM DS) 800-160 MG per tablet Take 1 tablet by mouth 2 times daily      nortriptyline (PAMELOR) 10 MG capsule       lisinopril (PRINIVIL;ZESTRIL) 10 MG tablet Take 10 mg by mouth daily No current facility-administered medications for this visit. Allergies: Allergies   Allergen Reactions    Nuts [Peanut-Containing Drug Products]     Other      peanuts       Social History:    Social History     Tobacco Use    Smoking status: Former Smoker     Packs/day: 0.50    Smokeless tobacco: Never Used   Substance Use Topics    Alcohol use: No    Drug use: No       Family History:   Family History   Problem Relation Age of Onset    High Blood Pressure Mother     Other Mother         Kidney Stones       Vitals:  Vitals:    03/19/21 1100   BP: (!) 142/90   Pulse: 79   Temp: 96.9 °F (36.1 °C)   TempSrc: Temporal   SpO2: 97%   Weight: 281 lb 11.2 oz (127.8 kg)   Height: 5' 11\" (1.803 m)        Subjective   REVIEW OF SYSTEMS:   Review of Systems   Constitutional: Negative. Negative for chills, diaphoresis and fever. HENT: Negative. Negative for congestion, ear pain, hearing loss, nosebleeds, sore throat and tinnitus. Eyes: Negative. Negative for pain, discharge and redness. Respiratory: Negative. Negative for cough, shortness of breath and wheezing. Cardiovascular: Negative. Negative for chest pain, palpitations and leg swelling. Gastrointestinal: Negative. Negative for abdominal pain, blood in stool, constipation, diarrhea, nausea and vomiting. Endocrine: Negative for polydipsia. Genitourinary: Negative for dysuria, flank pain, frequency, hematuria and urgency. Musculoskeletal: Negative. Negative for back pain, myalgias and neck pain. Skin: Negative. Negative for rash. Neurological: Negative. Negative for dizziness, tremors, seizures, weakness and headaches. Hematological: Does not bruise/bleed easily. Psychiatric/Behavioral: Negative. The patient is not nervous/anxious. Objective   PHYSICAL EXAM:  Physical Exam  Vitals signs reviewed. Constitutional:       General: He is not in acute distress. Appearance: He is well-developed.  He is not diaphoretic. HENT:      Head: Normocephalic and atraumatic. Mouth/Throat:      Pharynx: Uvula midline. Tonsils: No tonsillar exudate. Eyes:      General: Lids are normal. No scleral icterus. Right eye: No discharge. Left eye: No discharge. Conjunctiva/sclera: Conjunctivae normal.      Pupils: Pupils are equal, round, and reactive to light. Neck:      Musculoskeletal: Normal range of motion and neck supple. Thyroid: No thyroid mass or thyromegaly. Vascular: No JVD. Trachea: Trachea normal. No tracheal deviation. Cardiovascular:      Rate and Rhythm: Normal rate and regular rhythm. Heart sounds: Normal heart sounds. No murmur. No friction rub. No gallop. Pulmonary:      Effort: Pulmonary effort is normal. No respiratory distress. Breath sounds: Normal breath sounds. No wheezing or rales. Chest:      Chest wall: No tenderness. Abdominal:      General: Bowel sounds are normal. There is no distension. Palpations: Abdomen is soft. There is no mass. Tenderness: There is no abdominal tenderness. There is no guarding or rebound. Hernia: No hernia is present. Musculoskeletal:         General: No tenderness or deformity. Comments: Range of motion within normal limits x4 extremities   Skin:     General: Skin is warm. Coloration: Skin is not pale. Findings: No erythema or rash. Neurological:      Mental Status: He is alert and oriented to person, place, and time. Cranial Nerves: No cranial nerve deficit. Coordination: Coordination normal.   Psychiatric:         Behavior: Behavior normal.         Thought Content: Thought content normal.         Labs:  Lab Results   Component Value Date    WBC 7.19 03/19/2021    HGB 15.6 03/19/2021    HCT 44.5 03/19/2021    MCV 90.8 03/19/2021     03/19/2021     Lab Results   Component Value Date    NEUTROABS 4.60 03/19/2021     . Rafael Olden   Lab Results   Component Value Date     (H) 03/19/2021    K 4.3 03/19/2021     03/19/2021    CO2 22 03/19/2021    BUN 13 03/19/2021    CREATININE 0.84 03/19/2021    GLUCOSE 91 03/19/2021    CALCIUM 9.1 03/19/2021    PROT 6.9 03/19/2021    LABALBU 4.5 03/19/2021    BILITOT 1.3 (H) 03/19/2021    ALKPHOS 102 03/19/2021    AST 24 03/19/2021    ALT 24 03/19/2021    LABGLOM 117 03/19/2021    GFRAA 136 03/19/2021    AGRATIO 1.9 03/19/2021    GLOB 2.4 03/19/2021       ASSESSMENT/PLAN:      1. Seminoma of descended right testis (HCC, )Pure Seminoma, stage IIb and is status post curative treatment with right orchiectomy and completed 4 cycles of adjuvant chemotherapy with etoposide and cisplatin on 4/17/2020. He remains asymptomatic denying any abdominal discomfort. Labs from 12/14/2020 were within normal limits:, AFP 3.1, Beta HCG <1    2/21/2021-CT abdomen/pelvis with contrast revealed  Prior right orchiectomy. There is a solitary slightly prominent aortocaval node which is stable, and has been stable over the last several comparison exams. No new or increasing adenopathy. Solitary 2 mm nonobstructing left renal stone. I reviewed the CT images and agree that there is no increasing adenopathy. I reviewed the chest x-ray obtained today, agree it is clear without concerning findings. -CMP, LDH, AFP and beta-hCG today and then in 3-months at return appointment in June 2021  -Chest x-ray today and then at return appointment in June 2021  -CT scan of the abdomen and pelvis will be due in August 2021    2. Care plan discussed with patient, discussed importance of monitoring. Again today reviewed NCCN guidelines for close surveillance. 3. Toxicity, late effect, due to drug, medicine, or biological substance: Continues to have mild numbness and tingling to upper and lower extremities with no significant change from previous evaluation.      4.  Nicotine abuse, continues to smoke approximately 1/2 pack cigarettes daily  We talked

## 2021-11-05 ENCOUNTER — HOSPITAL ENCOUNTER (OUTPATIENT)
Dept: INFUSION THERAPY | Age: 31
End: 2021-11-05
Payer: COMMERCIAL

## 2021-12-29 ENCOUNTER — APPOINTMENT (OUTPATIENT)
Dept: CT IMAGING | Age: 31
End: 2021-12-29
Payer: COMMERCIAL

## 2021-12-29 ENCOUNTER — HOSPITAL ENCOUNTER (EMERGENCY)
Age: 31
Discharge: HOME | End: 2021-12-29
Attending: EMERGENCY MEDICINE
Payer: COMMERCIAL

## 2021-12-29 VITALS
OXYGEN SATURATION: 95 % | RESPIRATION RATE: 18 BRPM | WEIGHT: 250 LBS | BODY MASS INDEX: 35 KG/M2 | HEIGHT: 71 IN | SYSTOLIC BLOOD PRESSURE: 138 MMHG | HEART RATE: 60 BPM | TEMPERATURE: 97.5 F | DIASTOLIC BLOOD PRESSURE: 88 MMHG

## 2021-12-29 DIAGNOSIS — N20.1 URETERAL CALCULUS, LEFT: Primary | ICD-10-CM

## 2021-12-29 LAB
ALBUMIN SERPL-MCNC: 4.4 G/DL (ref 3.5–5.2)
ALP BLD-CCNC: 96 U/L (ref 40–130)
ALT SERPL-CCNC: 22 U/L (ref 5–41)
ANION GAP SERPL CALCULATED.3IONS-SCNC: 14 MMOL/L (ref 7–19)
AST SERPL-CCNC: 18 U/L (ref 5–40)
BACTERIA: NEGATIVE /HPF
BASOPHILS ABSOLUTE: 0.1 K/UL (ref 0–0.2)
BASOPHILS RELATIVE PERCENT: 1.1 % (ref 0–1)
BILIRUB SERPL-MCNC: 0.7 MG/DL (ref 0.2–1.2)
BILIRUBIN URINE: NEGATIVE
BLOOD, URINE: ABNORMAL
BUN BLDV-MCNC: 8 MG/DL (ref 6–20)
CALCIUM SERPL-MCNC: 9.1 MG/DL (ref 8.6–10)
CHLORIDE BLD-SCNC: 106 MMOL/L (ref 98–111)
CLARITY: CLEAR
CO2: 22 MMOL/L (ref 22–29)
COLOR: YELLOW
CREAT SERPL-MCNC: 0.9 MG/DL (ref 0.5–1.2)
CRYSTALS, UA: ABNORMAL /HPF
EOSINOPHILS ABSOLUTE: 0.3 K/UL (ref 0–0.6)
EOSINOPHILS RELATIVE PERCENT: 2.8 % (ref 0–5)
EPITHELIAL CELLS, UA: 0 /HPF (ref 0–5)
GFR AFRICAN AMERICAN: >59
GFR NON-AFRICAN AMERICAN: >60
GLUCOSE BLD-MCNC: 127 MG/DL (ref 74–109)
GLUCOSE URINE: NEGATIVE MG/DL
HCT VFR BLD CALC: 47.2 % (ref 42–52)
HEMOGLOBIN: 16.1 G/DL (ref 14–18)
HYALINE CASTS: 2 /HPF (ref 0–8)
IMMATURE GRANULOCYTES #: 0 K/UL
KETONES, URINE: NEGATIVE MG/DL
LEUKOCYTE ESTERASE, URINE: NEGATIVE
LYMPHOCYTES ABSOLUTE: 1.4 K/UL (ref 1.1–4.5)
LYMPHOCYTES RELATIVE PERCENT: 14 % (ref 20–40)
MCH RBC QN AUTO: 31.5 PG (ref 27–31)
MCHC RBC AUTO-ENTMCNC: 34.1 G/DL (ref 33–37)
MCV RBC AUTO: 92.4 FL (ref 80–94)
MONOCYTES ABSOLUTE: 0.6 K/UL (ref 0–0.9)
MONOCYTES RELATIVE PERCENT: 5.4 % (ref 0–10)
NEUTROPHILS ABSOLUTE: 7.8 K/UL (ref 1.5–7.5)
NEUTROPHILS RELATIVE PERCENT: 76.5 % (ref 50–65)
NITRITE, URINE: NEGATIVE
PDW BLD-RTO: 11.3 % (ref 11.5–14.5)
PH UA: 7 (ref 5–8)
PLATELET # BLD: 208 K/UL (ref 130–400)
PMV BLD AUTO: 9.9 FL (ref 9.4–12.4)
POTASSIUM SERPL-SCNC: 3.8 MMOL/L (ref 3.5–5)
PROTEIN UA: NEGATIVE MG/DL
RBC # BLD: 5.11 M/UL (ref 4.7–6.1)
RBC UA: 144 /HPF (ref 0–4)
SODIUM BLD-SCNC: 142 MMOL/L (ref 136–145)
SPECIFIC GRAVITY UA: 1.01 (ref 1–1.03)
TOTAL PROTEIN: 6.9 G/DL (ref 6.6–8.7)
UROBILINOGEN, URINE: 0.2 E.U./DL
WBC # BLD: 10.2 K/UL (ref 4.8–10.8)
WBC UA: 2 /HPF (ref 0–5)

## 2021-12-29 PROCEDURE — 81001 URINALYSIS AUTO W/SCOPE: CPT

## 2021-12-29 PROCEDURE — 36415 COLL VENOUS BLD VENIPUNCTURE: CPT

## 2021-12-29 PROCEDURE — 80053 COMPREHEN METABOLIC PANEL: CPT

## 2021-12-29 PROCEDURE — 74150 CT ABDOMEN W/O CONTRAST: CPT

## 2021-12-29 PROCEDURE — 85025 COMPLETE CBC W/AUTO DIFF WBC: CPT

## 2021-12-29 PROCEDURE — 99283 EMERGENCY DEPT VISIT LOW MDM: CPT

## 2021-12-29 PROCEDURE — 96374 THER/PROPH/DIAG INJ IV PUSH: CPT

## 2021-12-29 PROCEDURE — 6360000002 HC RX W HCPCS: Performed by: EMERGENCY MEDICINE

## 2021-12-29 RX ORDER — HYDROMORPHONE HYDROCHLORIDE 1 MG/ML
1 INJECTION, SOLUTION INTRAMUSCULAR; INTRAVENOUS; SUBCUTANEOUS ONCE
Status: COMPLETED | OUTPATIENT
Start: 2021-12-29 | End: 2021-12-29

## 2021-12-29 RX ORDER — ONDANSETRON 4 MG/1
4 TABLET, ORALLY DISINTEGRATING ORAL EVERY 8 HOURS PRN
Qty: 15 TABLET | Refills: 0 | Status: SHIPPED | OUTPATIENT
Start: 2021-12-29

## 2021-12-29 RX ORDER — OXYCODONE AND ACETAMINOPHEN 7.5; 325 MG/1; MG/1
1 TABLET ORAL EVERY 4 HOURS PRN
Qty: 15 TABLET | Refills: 0 | Status: SHIPPED | OUTPATIENT
Start: 2021-12-29 | End: 2022-01-05

## 2021-12-29 RX ORDER — TAMSULOSIN HYDROCHLORIDE 0.4 MG/1
0.4 CAPSULE ORAL DAILY
Qty: 10 CAPSULE | Refills: 0 | Status: SHIPPED | OUTPATIENT
Start: 2021-12-29

## 2021-12-29 RX ADMIN — HYDROMORPHONE HYDROCHLORIDE 1 MG: 1 INJECTION, SOLUTION INTRAMUSCULAR; INTRAVENOUS; SUBCUTANEOUS at 12:48

## 2021-12-29 ASSESSMENT — PAIN SCALES - GENERAL
PAINLEVEL_OUTOF10: 9
PAINLEVEL_OUTOF10: 10

## 2021-12-29 NOTE — ED PROVIDER NOTES
[facility] EMERGENCY DEPT  eMERGENCY dEPARTMENT eNCOUnter      Pt Name: Reyes Lynn  MRN: [id number]  [place] 1990  Date of evaluation: 12/29/2021  Provider: Raghavendra Hoyt MD    [address] [place] [address]       Chief Complaint   Patient presents with    Flank Pain     left side         HISTORY OF PRESENT ILLNESS   (Location/Symptom, Timing/Onset,Context/Setting, Quality, Duration, Modifying Factors, Severity)  Note limiting factors. Reyes Lynn is a 32 y.o. male who presents to the emergency department for evaluation regarding left-sided flank and abdominal pain. Patient states that this pain began today and has been of moderate severity. He describes the pain is fairly sharp in nature and with some radiation to the left flank to the left lower quadrant area. He has not had any fevers or chills. He does relate a little bit of nausea. He does have a prior history of testicular cancer and is concerned that there might be something going on related to his testicles as he does have some pain that radiates in the left groin area. He has not really had any hematuria. Patient denies dysuria or penile discharge. There have been no relieving factors for the symptoms. HPI    NursingNotes were reviewed. REVIEW OF SYSTEMS    (2-9 systems for level 4, 10 or more for level 5)     Review of Systems   Constitutional: Negative for chills and fever. Respiratory: Negative for cough and shortness of breath. Cardiovascular: Negative for chest pain. Gastrointestinal: Positive for abdominal pain. Negative for nausea and vomiting. Genitourinary: Positive for flank pain and testicular pain. Negative for difficulty urinating, dysuria, hematuria, penile swelling and scrotal swelling. All other systems reviewed and are negative.            PAST MEDICALHISTORY     Past Medical History:   Diagnosis Date    Back pain     Cancer ([address]     testicular    Hypertension     Lumbar stress fracture          SURGICAL HISTORY Past Surgical History:   Procedure Laterality Date    TESTICLE REMOVAL Right 2/4/2020    RIGHT RADICAL ORCHIECTOMY performed by Kenisha Duggan MD at [facility]     Discharge Medication List as of 12/29/2021  2:16 PM      CONTINUE these medications which have NOT CHANGED    Details   DUPIXENT 300 MG/2ML SOSY injection DAWHistorical Med      albuterol sulfate  (90 Base) MCG/ACT inhaler USE 2 PUFFS Q 4 H PRNHistorical Med      lisinopril (PRINIVIL;ZESTRIL) 10 MG tablet Take 10 mg by mouth dailyHistorical Med      montelukast (SINGULAIR) 10 MG tablet Take 10 mg by mouth nightlyHistorical Med             ALLERGIES     Nuts [peanut-containing drug products] and Other    FAMILY HISTORY       Family History   Problem Relation Age of Onset    High Blood Pressure Mother     Other Mother         Kidney Stones          SOCIAL HISTORY       Social History     Socioeconomic History    Marital status:      Spouse name: None    Number of children: None    Years of education: None    Highest education level: None   Occupational History    None   Tobacco Use    Smoking status: Former Smoker     Packs/day: 0.50    Smokeless tobacco: Never Used   Substance and Sexual Activity    Alcohol use: No    Drug use: No    Sexual activity: Yes     Partners: Female   Other Topics Concern    None   Social History Narrative    None     Social Determinants of Health     Financial Resource Strain:     Difficulty of Paying Living Expenses: Not on file   Food Insecurity:     Worried About Running Out of Food in the Last Year: Not on file    Andrea of Food in the Last Year: Not on file   Transportation Needs:     Lack of Transportation (Medical): Not on file    Lack of Transportation (Non-Medical):  Not on file   Physical Activity:     Days of Exercise per Week: Not on file    Minutes of Exercise per Session: Not on file   Stress:     Feeling of Stress : Not on file   Social Connections:     Frequency of Communication with Friends and Family: Not on file    Frequency of Social Gatherings with Friends and Family: Not on file    Attends Worship Services: Not on file    Active Member of Clubs or Organizations: Not on file    Attends Club or Organization Meetings: Not on file    Marital Status: Not on file   Intimate Partner Violence:     Fear of Current or Ex-Partner: Not on file    Emotionally Abused: Not on file    Physically Abused: Not on file    Sexually Abused: Not on file   Housing Stability:     Unable to Pay for Housing in the Last Year: Not on file    Number of [place] in the Last Year: Not on file    Unstable Housing in the Last Year: Not on file       SCREENINGS             PHYSICAL EXAM    (up to 7 for level 4, 8 or more for level 5)     ED Triage Vitals [12/29/21 0912]   BP Temp Temp Source Pulse Resp SpO2 Height Weight   (!) 155/101 97.5 °F (36.4 °C) Oral 75 17 95 % 5' 11\" (1.803 m) 250 lb (113.4 kg)       Physical Exam  Vitals and nursing note reviewed. HENT:      Head: Atraumatic. Mouth/Throat:      Mouth: Mucous membranes are not dry. Pharynx: No posterior oropharyngeal erythema. Eyes:      General: No scleral icterus. Pupils: Pupils are equal, round, and reactive to light. Neck:      Trachea: No tracheal deviation. Cardiovascular:      Rate and Rhythm: Normal rate and regular rhythm. Pulses: Normal pulses. Heart sounds: Normal heart sounds. No murmur heard. Pulmonary:      Effort: Pulmonary effort is normal. No respiratory distress. Breath sounds: Normal breath sounds. No stridor. Abdominal:      General: There is no distension. Palpations: Abdomen is soft. Tenderness: There is abdominal tenderness. There is no guarding. Musculoskeletal:      Right lower leg: No edema. Left lower leg: No edema. Skin:     Capillary Refill: Capillary refill takes less than 2 seconds.       Coloration: Skin is not pale. Findings: No rash. Neurological:      Mental Status: He is alert and oriented to person, place, and time. Psychiatric:         Behavior: Behavior is cooperative. DIAGNOSTIC RESULTS       RADIOLOGY:  Non-plain film images such as CT, Ultrasound and MRI are read by the radiologist. Plain radiographic images are visualized and preliminarily interpreted bythe emergency physician with the below findings:      Renny King   Final Result   1. A 4 mm radiopaque calculus in the left proximal ureter and moderate   left hydronephrosis and hydroureter. Signed by Dr Josee Ragsdale:  Labs Reviewed   CBC WITH AUTO DIFFERENTIAL - Abnormal; Notable for the following components:       Result Value    MCH 31.5 (*)     RDW 11.3 (*)     Neutrophils % 76.5 (*)     Lymphocytes % 14.0 (*)     Basophils % 1.1 (*)     Neutrophils Absolute 7.8 (*)     All other components within normal limits   COMPREHENSIVE METABOLIC PANEL - Abnormal; Notable for the following components:    Glucose 127 (*)     All other components within normal limits   URINALYSIS - Abnormal; Notable for the following components:    Blood, Urine LARGE (*)     All other components within normal limits   MICROSCOPIC URINALYSIS - Abnormal; Notable for the following components:    Bacteria, UA NEGATIVE (*)     Crystals, UA NEG (*)     RBC,  (*)     All other components within normal limits       All other labs were within normal range or not returned as of this dictation. EMERGENCY DEPARTMENT COURSE and DIFFERENTIAL DIAGNOSIS/MDM:   Vitals:    Vitals:    12/29/21 0912 12/29/21 1043   BP: (!) 155/101 138/88   Pulse: 75 60   Resp: 17 18   Temp: 97.5 °F (36.4 °C)    TempSrc: Oral    SpO2: 95% 95%   Weight: 250 lb (113.4 kg)    Height: 5' 11\" (1.803 m)        MDM    Reassessment    Urinalysis does not reveal evidence of infection. CT scan demonstrates a 4 mm calculus. His kidney function looks okay.   We will plan to discharge him home at this time. PROCEDURES:  Unless otherwise noted below, none     Procedures    FINAL IMPRESSION      1. Ureteral calculus, left          DISPOSITION/PLAN   DISPOSITION Decision To Discharge 12/29/2021 01:45:12 PM      PATIENT REFERRED TO:  Jani Ma MD  [address] [place] [address]  [phone]            DISCHARGE MEDICATIONS:  Discharge Medication List as of 12/29/2021  2:16 PM      START taking these medications    Details   ondansetron (ZOFRAN ODT) 4 MG disintegrating tablet Take 1 tablet by mouth every 8 hours as needed for Nausea or Vomiting, Disp-15 tablet, R-0Normal      tamsulosin (FLOMAX) 0.4 MG capsule Take 1 capsule by mouth daily, Disp-10 capsule, R-0Normal      oxyCODONE-acetaminophen (PERCOCET) 7.5-325 MG per tablet Take 1 tablet by mouth every 4 hours as needed for Pain for up to 7 days. , Disp-15 tablet, R-0Normal                (Please note that portions of this note were completed with a voice recognition program.  Efforts were made to edit thedictations but occasionally words are mis-transcribed.)    Ramesh Meier MD (electronically signed)  Attending Emergency Physician         Ramesh Meier MD  01/11/22 0643

## 2021-12-30 ENCOUNTER — TELEPHONE (OUTPATIENT)
Dept: UROLOGY | Age: 31
End: 2021-12-30

## 2021-12-30 NOTE — PROGRESS NOTES
Patient did not answer the phone but left him a voicemail for him to give us a call back to get a follow up appt scheduled

## 2021-12-30 NOTE — TELEPHONE ENCOUNTER
Called patient today to see how he was doing and to get him scheduled for a follow up with the APRN for his stone. Patient did not answer but left him a voicemail stating for him to call out office back to get scheduled.

## 2022-01-11 ASSESSMENT — ENCOUNTER SYMPTOMS
COUGH: 0
NAUSEA: 0
VOMITING: 0
ABDOMINAL PAIN: 1
SHORTNESS OF BREATH: 0

## (undated) DEVICE — DECANTER VI VENT W/ VLV FOR ASEP TRNSF OF FLD

## (undated) DEVICE — GLOVE SURG SZ 65 CRM LTX FREE POLYISOPRENE POLYMER BEAD ANTI

## (undated) DEVICE — GLOVE ORTHO 7 1/2   MSG9475

## (undated) DEVICE — SOLUTION IV IRRIG POUR BRL 0.9% SODIUM CHL 2F7124

## (undated) DEVICE — SUTURE VCRL SZ 2-0 L36IN ABSRB UD L36MM CT-1 1/2 CIR J945H

## (undated) DEVICE — TUBE ET 7.5MM NSL ORAL BASIC CUF INTMED MURPHY EYE RADPQ

## (undated) DEVICE — SUTURE VCRL SZ 0 L27IN ABSRB UD L36MM CT-1 1/2 CIR J260H

## (undated) DEVICE — SUTURE CHROMIC GUT SZ 3-0 L36IN ABSRB BRN L26MM SH 1/2 CIR G172H

## (undated) DEVICE — Z DISCONTINUED PER MEDLINE NO SUB DRAIN SURG L18IN DIA3/8IN LTX PENROSE UNIF WALL THICKNESS

## (undated) DEVICE — TOWEL,OR,DSP,ST,BLUE,DLX,4/PK,20PK/CS: Brand: MEDLINE

## (undated) DEVICE — SYSTEM SKIN CLSR 22CM DERMBND PRINEO

## (undated) DEVICE — STYLET INTUB 5FR L25CM ORAL DESIGNED RET DESIRED CRV SMOOTH

## (undated) DEVICE — MINOR CDS: Brand: MEDLINE INDUSTRIES, INC.

## (undated) DEVICE — SUTURE PERMAHAND SZ 0 L30IN NONABSORBABLE BLK SILK BRAID A306H

## (undated) DEVICE — TRAY PREP DRY W/ PREM GLV 2 APPL 6 SPNG 2 UNDPD 1 OVERWRAP

## (undated) DEVICE — SUTURE PERMAHAND SZ 2-0 L30IN NONABSORBABLE BLK SH L26MM C016D

## (undated) DEVICE — STERILE LATEX POWDER FREE SURGICAL GLOVES WITH HYDROGEL COATING: Brand: PROTEXIS

## (undated) DEVICE — LARYNGOSCOPE VID MILLER 2 MTL BLADE M HNDL CURAPLEX

## (undated) DEVICE — SUTURE PERMAHAND SZ 2-0 L30IN NONABSORBABLE BLK SILK W/O A305H

## (undated) DEVICE — STANDARD HYPODERMIC NEEDLE,POLYPROPYLENE HUB: Brand: MONOJECT

## (undated) DEVICE — PAD,ARMBOARD,CONV,FOAM,2X8X20",12PR/CS: Brand: MEDLINE

## (undated) DEVICE — SPONGE SURG WHT KTNR DISECT RADPQ ST

## (undated) DEVICE — SUTURE PERMA-HAND SZ 2-0 L30IN NONABSORBABLE BLK L26MM SH K833H

## (undated) DEVICE — PLATE ES AD W 9FT CRD 2

## (undated) DEVICE — SUTURE CHROMIC GUT SZ 2-0 L36IN ABSRB BRN SH L26MM 1/2 CIR G173H

## (undated) DEVICE — SUTURE CHROMIC GUT SZ 2-0 L27IN ABSRB BRN L36MM CT-1 1/2 811H

## (undated) DEVICE — SUTURE MCRYL SZ 4-0 L18IN ABSRB UD L19MM PS-2 3/8 CIR PRIM Y496G

## (undated) DEVICE — PACK,UNIVERSAL,NO GOWNS: Brand: MEDLINE